# Patient Record
Sex: FEMALE | Race: WHITE | NOT HISPANIC OR LATINO | Employment: PART TIME | URBAN - METROPOLITAN AREA
[De-identification: names, ages, dates, MRNs, and addresses within clinical notes are randomized per-mention and may not be internally consistent; named-entity substitution may affect disease eponyms.]

---

## 2017-05-26 ENCOUNTER — TELEPHONE (OUTPATIENT)
Dept: INTERNAL MEDICINE | Facility: CLINIC | Age: 57
End: 2017-05-26

## 2017-05-26 NOTE — TELEPHONE ENCOUNTER
Panel Management Review      Patient has the following on her problem list: None      Composite cancer screening  Chart review shows that this patient is due/due soon for the following Pap Smear  Summary:    Patient is due/failing the following:   PAP and PHYSICAL    Action needed:   Patient needs office visit for Physical with pap.    Type of outreach:    Left detail message on voicemail per patient. Consent to Communicate on file.       Questions for provider review:    None                                                                                                                                     Trino Eric, [facility]       Chart routed to CLOSED .

## 2017-06-08 ENCOUNTER — HOSPITAL ENCOUNTER (OUTPATIENT)
Dept: MAMMOGRAPHY | Facility: CLINIC | Age: 57
Discharge: HOME | End: 2017-06-08
Attending: SURGERY | Admitting: SURGERY
Payer: COMMERCIAL

## 2017-06-08 DIAGNOSIS — Z12.31 VISIT FOR SCREENING MAMMOGRAM: ICD-10-CM

## 2017-06-08 PROCEDURE — 77063 BREAST TOMOSYNTHESIS BI: CPT

## 2017-06-24 ENCOUNTER — HEALTH MAINTENANCE LETTER (OUTPATIENT)
Age: 57
End: 2017-06-24

## 2017-07-26 ENCOUNTER — TELEPHONE (OUTPATIENT)
Dept: PEDIATRICS | Facility: CLINIC | Age: 57
End: 2017-07-26

## 2017-07-26 NOTE — TELEPHONE ENCOUNTER
"Patient requesting to have Dr. Quintero become her PCP, Dr. Quintero's practice is currently closed but patient asking because she \"is a [employer] employee\" and is hoping that Dr. Quintero will make this exception. Please advise if this is okay or not. If okay, call patient back to reschedule current physical to Dr. uQintero's schedule.  -Yaz Moss      "

## 2017-07-27 NOTE — TELEPHONE ENCOUNTER
I only have clinic on Tues and Fri, if she is willing to live with the few days available, can switch to me.

## 2017-08-25 ENCOUNTER — OFFICE VISIT (OUTPATIENT)
Dept: PEDIATRICS | Facility: CLINIC | Age: 57
End: 2017-08-25
Payer: COMMERCIAL

## 2017-08-25 VITALS
HEIGHT: 65 IN | BODY MASS INDEX: 18.74 KG/M2 | DIASTOLIC BLOOD PRESSURE: 82 MMHG | HEART RATE: 71 BPM | OXYGEN SATURATION: 99 % | SYSTOLIC BLOOD PRESSURE: 130 MMHG | TEMPERATURE: 97.6 F | WEIGHT: 112.5 LBS

## 2017-08-25 DIAGNOSIS — R63.4 UNINTENTIONAL WEIGHT LOSS: ICD-10-CM

## 2017-08-25 DIAGNOSIS — R25.2 LEG CRAMPS: ICD-10-CM

## 2017-08-25 DIAGNOSIS — Z00.01 ENCOUNTER FOR ROUTINE ADULT PHYSICAL EXAM WITH ABNORMAL FINDINGS: Primary | ICD-10-CM

## 2017-08-25 LAB
ALBUMIN SERPL-MCNC: 4.4 G/DL (ref 3.4–5)
ALP SERPL-CCNC: 61 U/L (ref 40–150)
ALT SERPL W P-5'-P-CCNC: 31 U/L (ref 0–50)
ANION GAP SERPL CALCULATED.3IONS-SCNC: 9 MMOL/L (ref 3–14)
AST SERPL W P-5'-P-CCNC: 29 U/L (ref 0–45)
BILIRUB SERPL-MCNC: 0.7 MG/DL (ref 0.2–1.3)
BUN SERPL-MCNC: 12 MG/DL (ref 7–30)
CALCIUM SERPL-MCNC: 9.4 MG/DL (ref 8.5–10.1)
CHLORIDE SERPL-SCNC: 101 MMOL/L (ref 94–109)
CO2 SERPL-SCNC: 26 MMOL/L (ref 20–32)
CREAT SERPL-MCNC: 0.84 MG/DL (ref 0.52–1.04)
FERRITIN SERPL-MCNC: 62 NG/ML (ref 8–252)
GFR SERPL CREATININE-BSD FRML MDRD: 71 ML/MIN/1.7M2
GLUCOSE SERPL-MCNC: 98 MG/DL (ref 70–99)
IRON SATN MFR SERPL: 30 % (ref 15–46)
IRON SERPL-MCNC: 119 UG/DL (ref 35–180)
POTASSIUM SERPL-SCNC: 4.3 MMOL/L (ref 3.4–5.3)
PROT SERPL-MCNC: 7.7 G/DL (ref 6.8–8.8)
SODIUM SERPL-SCNC: 136 MMOL/L (ref 133–144)
TIBC SERPL-MCNC: 391 UG/DL (ref 240–430)
TSH SERPL DL<=0.005 MIU/L-ACNC: 0.62 MU/L (ref 0.4–4)

## 2017-08-25 PROCEDURE — 83516 IMMUNOASSAY NONANTIBODY: CPT | Mod: 59 | Performed by: INTERNAL MEDICINE

## 2017-08-25 PROCEDURE — 82784 ASSAY IGA/IGD/IGG/IGM EACH: CPT | Performed by: INTERNAL MEDICINE

## 2017-08-25 PROCEDURE — 87624 HPV HI-RISK TYP POOLED RSLT: CPT | Performed by: INTERNAL MEDICINE

## 2017-08-25 PROCEDURE — 99396 PREV VISIT EST AGE 40-64: CPT | Performed by: INTERNAL MEDICINE

## 2017-08-25 PROCEDURE — 84443 ASSAY THYROID STIM HORMONE: CPT | Performed by: INTERNAL MEDICINE

## 2017-08-25 PROCEDURE — 83540 ASSAY OF IRON: CPT | Performed by: INTERNAL MEDICINE

## 2017-08-25 PROCEDURE — 83516 IMMUNOASSAY NONANTIBODY: CPT | Performed by: INTERNAL MEDICINE

## 2017-08-25 PROCEDURE — 82728 ASSAY OF FERRITIN: CPT | Performed by: INTERNAL MEDICINE

## 2017-08-25 PROCEDURE — G0123 SCREEN CERV/VAG THIN LAYER: HCPCS | Performed by: INTERNAL MEDICINE

## 2017-08-25 PROCEDURE — 82306 VITAMIN D 25 HYDROXY: CPT | Performed by: INTERNAL MEDICINE

## 2017-08-25 PROCEDURE — 83550 IRON BINDING TEST: CPT | Performed by: INTERNAL MEDICINE

## 2017-08-25 PROCEDURE — 36415 COLL VENOUS BLD VENIPUNCTURE: CPT | Performed by: INTERNAL MEDICINE

## 2017-08-25 PROCEDURE — 80053 COMPREHEN METABOLIC PANEL: CPT | Performed by: INTERNAL MEDICINE

## 2017-08-25 NOTE — PROGRESS NOTES
SUBJECTIVE:   CC: Mercedes Lema is an 57 year old woman who presents for preventive health visit.     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  Used to be just foot cramps.  Increasing in intensity and frequency.  Legs gets jumpy.  Has had x6mos.  Disturbs sleep.  Will also get during day, usually when commuting home.  No back pain or neuro symptoms in legs otherwise.  Not vegetarian.  Takes ca with D occasionally and centrum silver 1/2 tab.  Weight loss, unintentional but stress high and anxiety.  No GI symptoms other than occasionally diarrhea.    Colonoscopy - scheduled in Oct at [facility]    Today's PHQ-2 Score:   PHQ-2 ( 1999 Pfizer) 8/25/2017   Q1: Little interest or pleasure in doing things 0   Q2: Feeling down, depressed or hopeless 0   PHQ-2 Score 0   Q1: Little interest or pleasure in doing things Not at all   Q2: Feeling down, depressed or hopeless Not at all   PHQ-2 Score 0       Abuse: Current or Past(Physical, Sexual or Emotional)- No  Do you feel safe in your environment - Yes    Social History   Substance Use Topics     Smoking status: Never Smoker     Smokeless tobacco: Never Used     Alcohol use Yes      Comment: Glass of red wine 4 days a week     The patient does not drink >3 drinks per day nor >7 drinks per week.    Reviewed orders with patient.  Reviewed health maintenance and updated orders accordingly - Yes  Labs reviewed in [facility]    Patient over age 50, mutual decision to screen reflected in health maintenance.      Pertinent mammograms  "are reviewed under the imaging tab.  History of abnormal Pap smear: NO - age 30-65 PAP every 5 years with negative HPV co-testing recommended    Reviewed and updated as needed this visit by clinical staffTobacco  Allergies  Meds  Problems  Med Hx  Surg Hx  Fam Hx  Soc Hx          Reviewed and updated as needed this visit by Provider  Allergies  Meds  Problems              ROS:  C: NEGATIVE for fever, chills, change in weight  I: NEGATIVE for worrisome rashes, moles or lesions  E: NEGATIVE for vision changes or irritation  ENT: NEGATIVE for ear, mouth and throat problems  R: NEGATIVE for significant cough or SOB  B: NEGATIVE for masses, tenderness or discharge  CV: NEGATIVE for chest pain, palpitations or peripheral edema  GI: NEGATIVE for nausea, abdominal pain, heartburn, or change in bowel habits  : NEGATIVE for unusual urinary or vaginal symptoms. No vaginal bleeding.  M: NEGATIVE for significant arthralgias or myalgia  N: NEGATIVE for weakness, dizziness or paresthesias  P: NEGATIVE for changes in mood or affect      OBJECTIVE:   /82 (BP Location: Right arm, Patient Position: Chair, Cuff Size: Adult Regular)  Pulse 71  Temp 97.6  F (36.4  C) (Oral)  Ht 5' 4.5\" (1.638 m)  Wt 112 lb 8 oz (51 kg)  LMP 02/08/2013  SpO2 99%  BMI 19.01 kg/m2  EXAM:  GENERAL: healthy, alert and no distress  EYES: Eyes grossly normal to inspection, PERRL and conjunctivae and sclerae normal  HENT: ear canals and TM's normal, nose and mouth without ulcers or lesions  NECK: no adenopathy, no asymmetry, masses, or scars and thyroid normal to palpation  RESP: lungs clear to auscultation - no rales, rhonchi or wheezes  BREAST: normal without masses, tenderness or nipple discharge and no palpable axillary masses or adenopathy  CV: regular rate and rhythm, normal S1 S2, no S3 or S4, no murmur, click or rub, no peripheral edema and peripheral pulses strong  ABDOMEN: soft, nontender, no hepatosplenomegaly, no masses and " "bowel sounds normal   (female): normal female external genitalia, normal urethral meatus, vaginal mucosa atrophic, and normal cervix without masses or discharge  MS: no gross musculoskeletal defects noted, no edema  SKIN: no suspicious lesions or rashes  NEURO: Normal strength and tone, mentation intact and speech normal  PSYCH: mentation appears normal, affect normal/bright    ASSESSMENT/PLAN:   1. Encounter for routine adult physical exam with abnormal findings  Routine health education discussed: calcium, diet, exercise, weight, safety.   - Pap imaged thin layer screen with HPV - recommended age 30 - 65 years (select HPV order below)  - HPV High Risk Types DNA Cervical    2. Leg cramps  Will screen for metabolic cause with labs per orders.  Discussed yoga or stretches to help prevent  - Ferritin  - Iron and iron binding capacity  - Vitamin D Deficiency    3. Unintentional weight loss  Will screen for metabolic cause with labs per orders. Discussed adequate calorie intake.  Colonoscopy as scheduled.  - Ferritin  - Iron and iron binding capacity  - Vitamin D Deficiency  - TSH with free T4 reflex  - Comprehensive metabolic panel  - IgA  - Tissue transglutaminase rebecca IgA and IgG    COUNSELING:  Reviewed preventive health counseling, as reflected in patient instructions    BP Screening:   Last 3 BP Readings:    BP Readings from Last 3 Encounters:   08/25/17 130/82   11/14/16 (!) 123/91   09/07/16 108/60       The following was recommended to the patient:  Re-screen BP within a year and recommended lifestyle modifications     reports that she has never smoked. She has never used smokeless tobacco.    Estimated body mass index is 19.01 kg/(m^2) as calculated from the following:    Height as of this encounter: 5' 4.5\" (1.638 m).    Weight as of this encounter: 112 lb 8 oz (51 kg).         Counseling Resources:  ATP IV Guidelines  Pooled Cohorts Equation Calculator  Breast Cancer Risk Calculator  FRAX Risk " Assessment  ICSI Preventive Guidelines  Dietary Guidelines for Americans, 2010  USDA's MyPlate  ASA Prophylaxis  Lung CA Screening  The 10-year ASCVD risk score (Valorie DC Jr, et al., 2013) is: 1.4%    Values used to calculate the score:      Age: 57 years      Sex: Female      Is Non- : No      Diabetic: No      Tobacco smoker: No      Systolic Blood Pressure: 130 mmHg      Is BP treated: No      HDL Cholesterol: 64 mg/dL      Total Cholesterol: 162 mg/dL     Tabitha Quintero MD  [facility]

## 2017-08-25 NOTE — PATIENT INSTRUCTIONS
Preventive Health Recommendations  Female Ages 50 - 64    Yearly exam: See your health care provider every year in order to  o Review health changes.   o Discuss preventive care.    o Review your medicines if your doctor has prescribed any.        If you get Pap tests with HPV test, you only need to test every 5 years, unless you have an abnormal result.       Have a mammogram every 1 to 2 years.    Have a colonoscopy this year as scheduled    Have a cholesterol test every 5 years, you are due in 2021    Have a diabetes test (fasting glucose) every five years you are due in 2021      Shots: Get a flu shot each year. Get a tetanus shot every 10 years.  You are due in 2024    Nutrition:     Eat at least 5 servings of fruits and vegetables each day.    Eat whole-grain bread, whole-wheat pasta and brown rice instead of white grains and rice.    Talk to your provider about Calcium and Vitamin D.     Lifestyle    Exercise at least 150 minutes a week (30 minutes a day, 5 days a week). This will help you control your weight and prevent disease.  Add yoga and/or stretching to see if it helps with leg cramps.    Limit alcohol to one drink per day.    No smoking.     Wear sunscreen to prevent skin cancer.     See your dentist every six months for an exam and cleaning.    See your eye doctor every 1 to 2 years.

## 2017-08-25 NOTE — LETTER
September 7, 2017    Mercedes Lema  [address] [place] [address]    Dear Mercedes,  We are happy to inform you that your PAP smear result from 08/25/17 is normal.  We are now able to do a follow up test on PAP smears. The DNA test is for HPV (Human Papilloma Virus). Cervical cancer is closely linked with certain types of HPV. Your result showed no evidence of high risk HPV.  Therefore we recommend you return in 5 years for your next pap smear and HPV test.  You will still need to return to the clinic every year for an annual exam and other preventive tests.  Please contact the clinic at [phone] with any questions.  Sincerely,    Tabitha Quintero MD/grecia

## 2017-08-25 NOTE — MR AVS SNAPSHOT
After Visit Summary   8/25/2017    Mercedes Lema    MRN: [id number]           Patient Information     Date Of Birth          1960        Visit Information        Provider Department      8/25/2017 8:30 AM Tabitha Quintero MD [facility]        Today's Diagnoses     Encounter for routine adult physical exam with abnormal findings    -  1    Leg cramps        Unintentional weight loss          Care Instructions      Preventive Health Recommendations  Female Ages 50 - 64    Yearly exam: See your health care provider every year in order to  o Review health changes.   o Discuss preventive care.    o Review your medicines if your doctor has prescribed any.        If you get Pap tests with HPV test, you only need to test every 5 years, unless you have an abnormal result.       Have a mammogram every 1 to 2 years.    Have a colonoscopy this year as scheduled    Have a cholesterol test every 5 years, you are due in 2021    Have a diabetes test (fasting glucose) every five years you are due in 2021      Shots: Get a flu shot each year. Get a tetanus shot every 10 years.  You are due in 2024    Nutrition:     Eat at least 5 servings of fruits and vegetables each day.    Eat whole-grain bread, whole-wheat pasta and brown rice instead of white grains and rice.    Talk to your provider about Calcium and Vitamin D.     Lifestyle    Exercise at least 150 minutes a week (30 minutes a day, 5 days a week). This will help you control your weight and prevent disease.    Limit alcohol to one drink per day.    No smoking.     Wear sunscreen to prevent skin cancer.     See your dentist every six months for an exam and cleaning.    See your eye doctor every 1 to 2 years.            Follow-ups after your visit        Follow-up notes from your care team     Return in about 1 year (around 8/25/2018) for Physical Exam.      Your next 10 appointments already scheduled     Nov 06, 2017  8:40 AM CST   Return Visit  "with Petrona Infante MD   [facility] ([facility])    [address] [address] [phone]              Who to contact     If you have questions or need follow up information about today's clinic visit or your schedule please contact [facility] directly at [phone].  Normal or non-critical lab and imaging results will be communicated to you by Auxmoneyhart, letter or phone within 4 business days after the clinic has received the results. If you do not hear from us within 7 days, please contact the clinic through Auxmoneyhart or phone. If you have a critical or abnormal lab result, we will notify you by phone as soon as possible.  Submit refill requests through Dropmysite or call your pharmacy and they will forward the refill request to us. Please allow 3 business days for your refill to be completed.          Additional Information About Your Visit        [facility] Information     Dropmysite lets you send messages to your doctor, view your test results, renew your prescriptions, schedule appointments and more. To sign up, go to [email or web address] . Click on \"Log in\" on the left side of the screen, which will take you to the Welcome page. Then click on \"Sign up Now\" on the right side of the page.     You will be asked to enter the access code listed below, as well as some personal information. Please follow the directions to create your username and password.     Your access code is: [email or web address]  Expires: 2017  9:09 AM     Your access code will  in 90 days. If you need help or a new code, please call your [place] clinic or [phone].        Care EveryWhere ID     This is your Care EveryWhere ID. This could be used by other organizations to access your [place] medical records  [id number]        Your Vitals Were     Pulse Temperature Height Last Period Pulse Oximetry BMI (Body Mass Index)    71 " "97.6  F (36.4  C) (Oral) 5' 4.5\" (1.638 m) 02/08/2013 99% 19.01 kg/m2       Blood Pressure from Last 3 Encounters:   08/25/17 130/82   11/14/16 (!) 123/91   09/07/16 108/60    Weight from Last 3 Encounters:   08/25/17 112 lb 8 oz (51 kg)   11/14/16 120 lb (54.4 kg)   09/07/16 120 lb (54.4 kg)              We Performed the Following     Comprehensive metabolic panel     Ferritin     IgA     Iron and iron binding capacity     Tissue transglutaminase rebecca IgA and IgG     TSH with free T4 reflex     Vitamin D Deficiency        Primary Care Provider Office Phone # Fax #    Tabitha Quintero [phone] [address] [place] [address]        Equal Access to Services     [facility]: Hadii konrad rivero Soernesto, waaxda luqadaha, qaybta kaalmada vineet, nomi solorio . So [facility] [phone].    ATENCIÓN: Si habla español, tiene a [place] disposición servicios gratuitos de asistencia lingüística. [facility] [phone].    We comply with applicable federal civil rights laws and [place] laws. We do not discriminate on the basis of race, color, national origin, age, disability sex, sexual orientation or gender identity.            Thank you!     Thank you for choosing [facility]  for your care. Our goal is always to provide you with excellent care. Hearing back from our patients is one way we can continue to improve our services. Please take a few minutes to complete the written survey that you may receive in the mail after your visit with us. Thank you!             Your Updated Medication List - Protect others around you: Learn how to safely use, store and throw away your medicines at www.disposemymeds.org.          This list is accurate as of: 8/25/17  9:09 AM.  Always use your most recent med list.                   Brand Name Dispense Instructions for use Diagnosis    CALCIUM 600 + D PO      bid        cyclobenzaprine 5 MG tablet    FLEXERIL    30 " tablet    1-2 po tid prn    Back muscle spasm       fexofenadine 180 MG tablet    ALLEGRA    30 Tab    1 TABLET DAILY    Allergic rhinitis, cause unspecified       LORazepam 0.5 MG tablet    ATIVAN    20 tablet    Take 1 tablet (0.5 mg) by mouth every 6 hours as needed for anxiety    Acute stress reaction       Multi-vitamin Tabs tablet   Generic drug:  multivitamin, therapeutic with minerals      1 TABLET DAILY

## 2017-08-27 LAB — DEPRECATED CALCIDIOL+CALCIFEROL SERPL-MC: 56 UG/L (ref 20–75)

## 2017-08-28 LAB
IGA SERPL-MCNC: 115 MG/DL (ref 70–380)
TTG IGA SER-ACNC: <1 U/ML
TTG IGG SER-ACNC: 1 U/ML

## 2017-08-30 LAB
COPATH REPORT: NORMAL
PAP: NORMAL

## 2017-08-31 LAB
FINAL DIAGNOSIS: NORMAL
HPV HR 12 DNA CVX QL NAA+PROBE: NEGATIVE
HPV16 DNA SPEC QL NAA+PROBE: NEGATIVE
HPV18 DNA SPEC QL NAA+PROBE: NEGATIVE
SPECIMEN DESCRIPTION: NORMAL

## 2017-10-16 ENCOUNTER — TRANSFERRED RECORDS (OUTPATIENT)
Dept: HEALTH INFORMATION MANAGEMENT | Facility: CLINIC | Age: 57
End: 2017-10-16

## 2017-11-01 ENCOUNTER — TELEPHONE (OUTPATIENT)
Dept: PEDIATRICS | Facility: CLINIC | Age: 57
End: 2017-11-01

## 2017-11-01 NOTE — TELEPHONE ENCOUNTER
Please call pt to check on her weight - was losing weight unintentionally and BMI low.  Labs were normal and just had her colonoscopy was normal.  If still losing weight, should follow-up with me in clinic.

## 2017-11-02 NOTE — TELEPHONE ENCOUNTER
Routing to Dr. Quintero to update:     Patient calling back, she does not think she has lost any more weight. She doesn't weigh herself at home. Will start to weigh herself occasionally. I gave her her weight from 8/25/17 and advised that she call back if it's lower than this number. She agrees with plan.     She has also been increasing her intake. She relates increased stress due to loss of family members this year to her poor intake causing weight loss.     She feels that she is stable emotionally, has no concerns, believes going through the grieving process will improve stress levels.

## 2017-11-20 ENCOUNTER — OFFICE VISIT (OUTPATIENT)
Dept: SURGERY | Facility: CLINIC | Age: 57
End: 2017-11-20
Payer: COMMERCIAL

## 2017-11-20 VITALS
HEART RATE: 80 BPM | BODY MASS INDEX: 19.63 KG/M2 | SYSTOLIC BLOOD PRESSURE: 148 MMHG | HEIGHT: 64 IN | DIASTOLIC BLOOD PRESSURE: 88 MMHG | WEIGHT: 115 LBS

## 2017-11-20 DIAGNOSIS — D05.11 NEOPLASM OF RIGHT BREAST, PRIMARY TUMOR STAGING CATEGORY TIS: DUCTAL CARCINOMA IN SITU (DCIS): Primary | ICD-10-CM

## 2017-11-20 PROCEDURE — 99213 OFFICE O/P EST LOW 20 MIN: CPT | Performed by: SURGERY

## 2017-11-20 NOTE — NURSING NOTE
Breast Patients    BREAST PATIENTS (ALL)    1-Do you have any of the following symptoms? Other: NONE  2-In which breast are you having the symptoms? N/A  3-Do you use hormones?  No  4-Have you had a Mammogram? Yes  Where: [facility]  Date: April 2017  5-Have you ever had a breast cyst drained? No  6-Have you ever had a breast biopsy? Yes    7-Have you ever had a Breast Cancer? Yes  Side: Right  Date: 2009   8-Is there a history of Breast Cancer in your family? No  9-Have you ever had Ovarian Cancer? No  10-Is there a history of Ovarian Cancer in your family? No  11-Summarize your daily caffeine intake (i.e. coffee, tea, chocolate, soda etc.): Daily    BREAST PATIENTS (FEMALE)    12-What age did your periods begin? 14  13-Date your last menstrual period began? 2012  14-Number of full-term pregnancies: 0  15-How many children do you have? 0  Ages N/A  15-Your age when your first child was born? N/A  16-Did you nurse your children? N/A  17-Are you pregnant now? No  18-Have you begun menopause? Yes  Age Menopause began:  N/A  19-Have you had either ovary removed?Yes  Date of Surgery:  2012  20-Do you have breast implants? No   22-Are you on birth control? No  23-What is your marital status? single (never )  24-Do you exercise? Yes Several times per week  25-What is your occupation? Unknown

## 2017-11-20 NOTE — LETTER
"2017    Re: Mercedes Lema, : 1960    Mercedes Lema is a 57 year old female who is seen in follow up for a personal history of right breast DCIS for which she had a lumpectomy and radiation in .  The 3D mammograms from 2017 were negative by report and my review.  Mercedes reports that she has noted no breast changes and that her health has generally been good.  She did have some unintentional weight loss which may have been related to stress and which she is trying to correct with diet changes.     REVIEW OF SYSTEMS:  Constitutional:  Negative for chills, fatigue, fever and weight change.  Eyes:  Negative for blurred vision, eye pain and photophobia.  ENT:  Negative for hearing problems, ENT pain, congestion, rhinorrhea, epistaxis, hoarseness and dental problems.  Cardiovascular:  Negative for chest pain, palpitations, tachycardia, orthopnea and edema.  Respiratory:  Negative for cough, dyspnea and hemoptysis.  Gastrointestinal:  Negative for abdominal pain, heartburn, constipation, diarrhea and stool changes.  Musculoskeletal:  Negative for arthralgias, back pain and myalgias.  Integumentary/Breast:  See HPI.     Vitals: /88  Pulse 80  Ht 5' 4\" (1.626 m)  Wt 115 lb (52.2 kg)  LMP 2013  BMI 19.74 kg/m2  BMI= Body mass index is 19.74 kg/(m^2).     EXAM:  GENERAL: healthy, alert and no distress   BREAST:  The breasts appear symmetric with no overlying skin changes.  The nipples are normal bilaterally.  There is no dimpling or thickening of the skin.  Well healed scars are seen on both upper breasts.  No mass is appreciated in either breast.  The breast tissue is generally dense, but soft with the most density noted in the upper outer quadrants of both breasts.  There is no axillary or supraclavicular lymphadenopathy.  CARDIOVASCULAR:  No edema or JVD.  RESPIRATORY: negative findings: no chest deformities noted, no chest wall tenderness, breathing is unlabored.  NECK: " Neck supple. No adenopathy.   SKIN: No suspicious lesions or rashes  LYMPH: Normal cervical lymph nodes     ASSESSMENT:  Mercedes Lema appears to be doing very well.  There are no concerning findings on clinical breast exam or mammograms at this time.  She is at low risk for recurrence having had stage 0 breast cancer.     We talked about the issue of vaginal dryness in menopause and the use of vaginal estrogen.  I told Mercedes that it is considered safe for women who have had breast cancer to use vaginal estrogen if needed.     PLAN:  Mercedes will continue with annual screening mammograms with tomosynthesis and clinical breast exams.  She will follow up here as needed.     Petrona Infante MD

## 2017-11-20 NOTE — MR AVS SNAPSHOT
"              After Visit Summary   2017    Mercedes Lema    MRN: [id number]           Patient Information     Date Of Birth          1960        Visit Information        Provider Department      2017 9:00 AM Petrona Infante MD [facility]       Follow-ups after your visit        Who to contact     If you have questions or need follow up information about today's clinic visit or your schedule please contact [facility] directly at [phone].  Normal or non-critical lab and imaging results will be communicated to you by [facility], letter or phone within 4 business days after the clinic has received the results. If you do not hear from us within 7 days, please contact the clinic through [facility] or phone. If you have a critical or abnormal lab result, we will notify you by phone as soon as possible.  Submit refill requests through [email or web address] or call your pharmacy and they will forward the refill request to us. Please allow 3 business days for your refill to be completed.          Additional Information About Your Visit        MyChart Information     [email or web address] lets you send messages to your doctor, view your test results, renew your prescriptions, schedule appointments and more. To sign up, go to [email or web address] . Click on \"Log in\" on the left side of the screen, which will take you to the Welcome page. Then click on \"Sign up Now\" on the right side of the page.     You will be asked to enter the access code listed below, as well as some personal information. Please follow the directions to create your username and password.     Your access code is: [email or web address]  Expires: 2017  8:09 AM     Your access code will  in 90 days. If you need help or a new code, please call your [facility] clinic or [phone].        Care EveryWhere ID     This is your Care EveryWhere ID. This " "could be used by other organizations to access your [facility] medical records  [id number]        Your Vitals Were     Pulse Height Last Period BMI (Body Mass Index)          80 5' 4\" (1.626 m) 02/08/2013 19.74 kg/m2         Blood Pressure from Last 3 Encounters:   11/20/17 148/88   08/25/17 130/82   11/14/16 (!) 123/91    Weight from Last 3 Encounters:   11/20/17 115 lb (52.2 kg)   08/25/17 112 lb 8 oz (51 kg)   11/14/16 120 lb (54.4 kg)              Today, you had the following     No orders found for display       Primary Care Provider Office Phone # Fax #    Tabitha Quintero [phone] [address] [place] [id number]        Equal Access to Services     [facility]: Hadii konrad rivero Soern[place], waaxda luqadaha, qaybta kaalmada vineet, nomi solorio . So [facility] [phone].    ATENCIÓN: Si habla español, tiene a [place] disposición servicios gratuitos de asistencia lingüística. [facility] [phone].    We comply with applicable federal civil rights laws and [place] laws. We do not discriminate on the basis of race, color, national origin, age, disability, sex, sexual orientation, or gender identity.            Thank you!     Thank you for choosing [facility]  for your care. Our goal is always to provide you with excellent care. Hearing back from our patients is one way we can continue to improve our services. Please take a few minutes to complete the written survey that you may receive in the mail after your visit with us. Thank you!             Your Updated Medication List - Protect others around you: Learn how to safely use, store and throw away your medicines at www.disposemymeds.org.          This list is accurate as of: 11/20/17  9:26 AM.  Always use your most recent med list.                   Brand Name Dispense Instructions for use Diagnosis    CALCIUM 600 + D PO      bid        cyclobenzaprine 5 MG tablet    " FLEXERIL    30 tablet    1-2 po tid prn    Back muscle spasm       fexofenadine 180 MG tablet    ALLEGRA    30 Tab    1 TABLET DAILY    Allergic rhinitis, cause unspecified       LORazepam 0.5 MG tablet    ATIVAN    20 tablet    Take 1 tablet (0.5 mg) by mouth every 6 hours as needed for anxiety    Acute stress reaction       Multi-vitamin Tabs tablet   Generic drug:  multivitamin, therapeutic with minerals      1 TABLET DAILY

## 2017-11-20 NOTE — PROGRESS NOTES
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  The 3D mammograms from 6/2017 were negative by report and my review.  Mercedes reports that she has noted no breast changes and that her health has generally been good.  She did have some unintentional weight loss which may have been related to stress and which she is trying to correct with diet changes.    REVIEW OF SYSTEMS:  Constitutional:  Negative for chills, fatigue, fever and weight change.  Eyes:  Negative for blurred vision, eye pain and photophobia.  ENT:  Negative for hearing problems, ENT pain, congestion, rhinorrhea, epistaxis, hoarseness and dental problems.  Cardiovascular:  Negative for chest pain, palpitations, tachycardia, orthopnea and edema.  Respiratory:  Negative for cough, dyspnea and hemoptysis.  Gastrointestinal:  Negative for abdominal pain, heartburn, constipation, diarrhea and stool changes.  Musculoskeletal:  Negative for arthralgias, back pain and myalgias.  Integumentary/Breast:  See HPI.    Past Medical History:   Diagnosis Date     Allergic rhinitis, cause unspecified      Anxiety state, unspecified     fear of flying (Ativan)     Breast cancer (H) 8/7/2009    Right Breast Cancer       Past Surgical History:   Procedure Laterality Date     BIOPSY  8/7/2009    Right Breast Needle Biopsy     BIOPSY  8/20/2009    MRI Guided Right Breast Biopsy     BIOPSY  8/21/2009    Left Breast US Guided Biopsy     BREAST SURGERY  9/8/2009    Right/Left Lumpectomies     LAPAROSCOPIC HYSTERECTOMY SUPRACERVICAL, BILATERAL SALPINGO-OOPHORECTOMY, COMBINED  2/20/2013    Procedure: COMBINED LAPAROSCOPIC HYSTERECTOMY SUPRACERVICAL, SALPINGO-OOPHORECTOMY;  LAPAROSCOPIC HYSTERECTOMY SUPRACERVICAL,  "SALPINGO-OOPHORECTOMY (MORCELLATOR, TRIP LOOP)   ;  Surgeon: Suzy Soni MD;  Location: [facility]       Family History   Problem Relation Age of Onset     CANCER Mother      Cervical     Family History Negative Father      Breast Cancer Other      cousin       Social History   Substance Use Topics     Smoking status: Never Smoker     Smokeless tobacco: Never Used     Alcohol use Yes      Comment: Glass of red wine 4 days a week       Patient Active Problem List   Diagnosis     Allergic rhinitis     Breast cancer (H)     CARDIOVASCULAR SCREENING; LDL GOAL LESS THAN 160     Allergies   Allergen Reactions     Augmentin GI Disturbance     Tequin GI Disturbance     Current Outpatient Prescriptions   Medication Sig Dispense Refill     LORazepam (ATIVAN) 0.5 MG tablet Take 1 tablet (0.5 mg) by mouth every 6 hours as needed for anxiety 20 tablet 0     cyclobenzaprine (FLEXERIL) 5 MG tablet 1-2 po tid prn 30 tablet 1     FEXOFENADINE  MG PO TABS 1 TABLET DAILY 30 Tab 11     CALCIUM 600 + D OR bid       MULTI-VITAMIN OR TABS 1 TABLET DAILY       Vitals: /88  Pulse 80  Ht 5' 4\" (1.626 m)  Wt 115 lb (52.2 kg)  LMP 02/08/2013  BMI 19.74 kg/m2  BMI= Body mass index is 19.74 kg/(m^2).    EXAM:  GENERAL: healthy, alert and no distress   BREAST:  The breasts appear symmetric with no overlying skin changes.  The nipples are normal bilaterally.  There is no dimpling or thickening of the skin.  Well healed scars are seen on both upper breasts.  No mass is appreciated in either breast.  The breast tissue is generally dense, but soft with the most density noted in the upper outer quadrants of both breasts.  There is no axillary or supraclavicular lymphadenopathy.  CARDIOVASCULAR:  No edema or JVD.  RESPIRATORY: negative findings: no chest deformities noted, no chest wall tenderness, breathing is unlabored.  NECK: Neck supple. No adenopathy.   SKIN: No suspicious lesions or rashes  LYMPH: Normal cervical " lymph nodes    ASSESSMENT:  Mercedes Lema appears to be doing very well.  There are no concerning findings on clinical breast exam or mammograms at this time.  She is at low risk for recurrence having had stage 0 breast cancer.    We talked about the issue of vaginal dryness in menopause and the use of vaginal estrogen.  I told Mercedes that it is considered safe for women who have had breast cancer to use vaginal estrogen if needed.    PLAN:  Mercedes will continue with annual screening mammograms with tomosynthesis and clinical breast exams.  She will follow up here as needed.    Petrona Infante MD    Please route or send letter to:  Primary Care Provider (PCP)

## 2017-11-22 ENCOUNTER — TELEPHONE (OUTPATIENT)
Dept: PEDIATRICS | Facility: CLINIC | Age: 57
End: 2017-11-22

## 2017-11-22 DIAGNOSIS — N95.2 ATROPHIC VAGINITIS: Primary | ICD-10-CM

## 2017-11-22 RX ORDER — ESTRADIOL 10 UG/1
10 INSERT VAGINAL
Qty: 8 TABLET | Refills: 3 | Status: SHIPPED | OUTPATIENT
Start: 2017-11-23 | End: 2018-01-19

## 2017-11-22 NOTE — TELEPHONE ENCOUNTER
Patient states that at the time of her physical she had discussed vaginal dryness with Dr. Quintero. States Dr. Quintero had said that she should think about Vagifem and if she wanted it she should call back. States she discussed with an oncologist and he recommended the Vagifem tablet rather than the cream. She wants the Vagifem tablet. Please advise if she would need a visit to discuss. Pharmacy loaded if not necessary.  [phone] ok to kate Haywood, RN

## 2017-12-19 ENCOUNTER — TELEPHONE (OUTPATIENT)
Dept: PEDIATRICS | Facility: CLINIC | Age: 57
End: 2017-12-19

## 2017-12-19 NOTE — TELEPHONE ENCOUNTER
Patient calls.  Asking for Dr. Quintero's opinion if Vagifem could be causing higher blood pressure.  Patient started Vagifem 3 weeks ago, notes that her blood pressure has been higher since then.  States that she may have hypertension problem regardless of her taking the medication, but wanted to rule that out first.  Her BP in the evenings have been about 145/95 range, they are better in the mornings at 130-some/84 range.  She denies symptoms at daytime, but states that she has been getting headaches and feels lightheaded at times in the evenings.  Denies chest pain, SOB, weakness.  She denies scheduling appointment to check and discuss blood pressure, wants to rule this out first.  Please advise.    Rachell Ventura RN  Message handled by Nurse Triage.

## 2017-12-19 NOTE — TELEPHONE ENCOUNTER
vagifem should only be used twice weekly at bedtime.  It should not be causing this.  Should make an appointment.

## 2017-12-20 NOTE — TELEPHONE ENCOUNTER
Patient calls back, advised.  Taking twice a week as prescribed.  Advised to schedule appointment again.  Patient in agreement, appointment scheduled:    Next 5 appointments (look out 90 days)     Jan 02, 2018  3:50 PM CST   [facility] with Tabitha Quintero MD   [facility]    [address] [place] [id number] [phone]                Rachell Ventura RN  Message handled by Nurse Triage.

## 2017-12-22 ENCOUNTER — OFFICE VISIT (OUTPATIENT)
Dept: PEDIATRICS | Facility: CLINIC | Age: 57
End: 2017-12-22
Payer: COMMERCIAL

## 2017-12-22 VITALS
TEMPERATURE: 97.6 F | WEIGHT: 122.4 LBS | SYSTOLIC BLOOD PRESSURE: 132 MMHG | HEIGHT: 64 IN | BODY MASS INDEX: 20.9 KG/M2 | OXYGEN SATURATION: 98 % | DIASTOLIC BLOOD PRESSURE: 90 MMHG | HEART RATE: 77 BPM

## 2017-12-22 DIAGNOSIS — I10 BENIGN ESSENTIAL HYPERTENSION: Primary | ICD-10-CM

## 2017-12-22 PROCEDURE — 99214 OFFICE O/P EST MOD 30 MIN: CPT | Performed by: INTERNAL MEDICINE

## 2017-12-22 RX ORDER — HYDROCHLOROTHIAZIDE 12.5 MG/1
12.5 CAPSULE ORAL DAILY
Qty: 30 CAPSULE | Refills: 0 | Status: SHIPPED | OUTPATIENT
Start: 2017-12-22 | End: 2017-12-26

## 2017-12-22 NOTE — PATIENT INSTRUCTIONS
High Blood Pressure, New, Begin Treatment  Your blood pressure was high enough today to start treatment with medicines. Often health care providers don t know what causes high blood pressure (hypertension). But it can be controlled with lifestyle changes and medicines. High blood pressure usually has no symptoms. But it can sometimes cause headache, dizziness, blurred vision, a rushing sound in your ears, chest pain, or shortness of breath. But even without symptoms, high blood pressure that s not treated raises your risk for heart attack and stroke. High blood pressure is a serious health risk and shouldn t be ignored.    A blood pressure reading is made up of two numbers: a higher number over a lower number. The top number is the systolic pressure. The bottom number is the diastolic pressure. A normal blood pressure is a systolic pressure of less than 120 over a diastolic pressure less than 80. You will see your blood pressure readings written together. For example, a person with a systolic pressure of 118 and a diastolic pressure of 78 will have 118/78 written in the medical record.  High blood pressure is when either the top number is 140 or higher, or the bottom number is 90 or higher. High blood pressure is diagnosed when multiple, separate readings show blood pressures above 140/90. The blood pressures between normal and high are called prehypertension.  Home care  If you have high blood pressure, you should do what is listed below to lower your blood pressure. If you are taking medicines for high blood pressure, these methods may reduce or end your need for medicines in the future.    Begin a weight-loss program if you are overweight.    Cut back on how much salt you get in your diet. Here s how to do this:    Don t eat foods that have a lot of salt. These include olives, pickles, smoked meats, and salted potato chips.    Don t add salt to your food at the table.    Use only small amounts of salt when  cooking.    Review food labels to track how much salt is in prepared foods.    When eating out, ask that no additional salt be added to your food order.    Begin an exercise program. Talk with your health care provider about the type of exercise program that would be best for you. It doesn't have to be hard. Even brisk walking for 20 minutes 3 times a week is a good form of exercise.  Every little is good for you.    Don t take medicines that have heart stimulants. This includes many over-the-counter cold and sinus decongestant pills and sprays, as well as diet pills. Check the warnings about hypertension on the label. Before purchasing any over-the-counter medicines or supplements, always ask the pharmacist about the product's potential interaction with your high blood pressure and your high blood pressure medicines.    Stimulants such as amphetamine or cocaine could be lethal for someone with high blood pressure. Never take these.    Limit how much caffeine you get in your diet. Switch to caffeine-free products.    Stop smoking. If you are a long-time smoker, this can be hard. Enroll in a stop-smoking program to make it more likely that you will quit for good.    Learn how to handle stress. This is an important part of any program to lower blood pressure. Learn about relaxation methods like meditation, yoga, or biofeedback.    If your provider prescribed medicines, take them exactly as directed. Missing doses may cause your blood pressure get out of control.    If you miss a dose or doses, check with your healthcare provider or pharmacist about what to do.    Consider buying an automatic blood pressure machine. Your provider can make a recommendation. You can get one of these at most pharmacies.  The American Heart Association recommends the following guidelines for home blood pressure monitoring:    Don't smoke or drink coffee for 30 minutes before taking your blood pressure.    Go to the bathroom before the  test.    Relax for 5 minutes before taking the measurement.    Sit with your back supported (don't sit on a couch or soft chair); keep your feet on the floor uncrossed. Place your arm on a solid flat surface (like a table) with the upper part of the arm at heart level. Place the middle of the cuff directly above the eye of the elbow. Check the monitor's instruction manual for an illustration.    Take multiple readings. When you measure, take 2 to 3 readings one minute apart and record all of the results.    Take your blood pressure at the same time every day, or as your healthcare provider recommends.    Record the date, time, and blood pressure reading.    Take the record with you to your next medical appointment. If your blood pressure monitor has a built-in memory, simply take the monitor with you to your next appointment.    Call your provider if you have several high readings. Don't be frightened by a single high blood pressure reading, but if you get several high readings, check in with your healthcare provider.    Note: When blood pressure reaches a systolic (top number) of 180 or higher OR diastolic (bottom number) of 110 or higher, seek emergency medical treatment.  Follow-up care  Because a new blood pressure medicine was started today, it s important that you have your blood pressure rechecked. This is to make sure that the medicine is working and that you have no serious side effects. Keep all your follow up appointments. Write down medicine and blood pressure questions and bring them to your next appointment. If you have pressing concerns about your new medicine or your blood pressure, call your provider. Unless told otherwise, follow up with labs in 2 weeks and have your BP rechecked with the pharmacy and they will send it to me to adjust your medication.  When to seek medical advice  Call your healthcare provider right away if any of these occur:    Blood pressure reaches a systolic (top number) of  180 or higher, OR diastolic (bottom number) of 110 or higher, seek emergency medical treatment.    Chest pain or shortness of breath    Severe headache    Throbbing or rushing sound in the ears    Nosebleed    Sudden severe pain in your belly (abdomen)    Extreme drowsiness, confusion, or fainting    Dizziness or dizziness with a spinning sensation (vertigo)    Weakness of an arm or leg or one side of the face    You have problems speaking or seeing   Date Last Reviewed: 12/1/2016 2000-2017 [employer]. [address] [place] [id number]. All rights reserved. This information is not intended as a substitute for professional medical care. Always follow your healthcare professional's instructions.

## 2017-12-22 NOTE — PROGRESS NOTES
"  SUBJECTIVE:   Mercedes Lema is a 57 year old female who presents to clinic today for the following health issues:      Hypertension       Outpatient blood pressures are being checked at home.  Results are 140s/90s.    Low Salt Diet: no added salt        Amount of exercise or physical activity: None     Problems taking medications regularly: No    Medication side effects: none    Diet: regular (no restrictions)      Pt has family hx of hypertension and has been getting a head pressure for past 5d,mild nausea,fatigue,lightheaded and started taking her bp and has been running higher.  Sodium intake is minimal.  Eats out or processed food about 20%.    Problem list and histories reviewed & adjusted, as indicated.  Additional history: as documented    Labs reviewed in EPIC    Reviewed and updated as needed this visit by clinical staffTobacco  Allergies  Meds  Problems  Med Hx  Surg Hx  Fam Hx  Soc Hx        Reviewed and updated as needed this visit by Provider  Allergies  Meds  Problems         ROS:  Constitutional, cardiovascular, pulmonary, gi and gu systems are negative, except as otherwise noted.      OBJECTIVE:   /90 (BP Location: Right arm, Patient Position: Chair, Cuff Size: Adult Regular)  Pulse 77  Temp 97.6  F (36.4  C) (Oral)  Ht 5' 4\" (1.626 m)  Wt 122 lb 6.4 oz (55.5 kg)  LMP 02/08/2013  SpO2 98%  BMI 21.01 kg/m2  Body mass index is 21.01 kg/(m^2).  GENERAL: healthy, alert and no distress        ASSESSMENT/PLAN:       1. Benign essential hypertension  Discussed sodium restriction, maintaining ideal body weight and regular exercise program as physiologic means to achieve blood pressure control. The patient will strive towards this. Meanwhile, it is appropriate to lower BP with medications, while observing for therapeutic effect and if appropriate later, can discontinue medications if physiologic methods appear to be effective. The patient indicates understanding of these issues " and agrees with the plan. The various types of antihypertensives are discussed fully. See medication orders in EpicCare. Side effects explained in detail. Continue home readings and follow-up for labs and BP check with pharmacy in 2 wks  - hydrochlorothiazide (MICROZIDE) 12.5 MG capsule; Take 1 capsule (12.5 mg) by mouth daily  Dispense: 30 capsule; Refill: 0  - **Basic metabolic panel FUTURE 14d; Future    See Patient Instructions    The 10-year ASCVD risk score (Redding DC Jr, et al., 2013) is: 1.5%    Values used to calculate the score:      Age: 57 years      Sex: Female      Is Non- : No      Diabetic: No      Tobacco smoker: No      Systolic Blood Pressure: 132 mmHg      Is BP treated: No      HDL Cholesterol: 64 mg/dL      Total Cholesterol: 162 mg/dL     Tabitha Quintero MD  [facility] [place]    25 min with pt and more than 50% of the time was spent in counseling and coordination of care of the above issues

## 2017-12-22 NOTE — NURSING NOTE
"Chief Complaint   Patient presents with     Hypertension       Initial /90 (BP Location: Right arm, Patient Position: Chair, Cuff Size: Adult Regular)  Pulse 77  Temp 97.6  F (36.4  C) (Oral)  Ht 5' 4\" (1.626 m)  Wt 122 lb 6.4 oz (55.5 kg)  LMP 02/08/2013  SpO2 98%  BMI 21.01 kg/m2 Estimated body mass index is 21.01 kg/(m^2) as calculated from the following:    Height as of this encounter: 5' 4\" (1.626 m).    Weight as of this encounter: 122 lb 6.4 oz (55.5 kg).  Medication Reconciliation: complete   Dianne Carrillo MA    "

## 2017-12-22 NOTE — MR AVS SNAPSHOT
After Visit Summary   12/22/2017    Mercedes Lema    MRN: [id number]           Patient Information     Date Of Birth          1960        Visit Information        Provider Department      12/22/2017 3:50 PM Tabitha Quintero MD [facility]        Today's Diagnoses     Benign essential hypertension    -  1      Care Instructions      High Blood Pressure, New, Begin Treatment  Your blood pressure was high enough today to start treatment with medicines. Often health care providers don t know what causes high blood pressure (hypertension). But it can be controlled with lifestyle changes and medicines. High blood pressure usually has no symptoms. But it can sometimes cause headache, dizziness, blurred vision, a rushing sound in your ears, chest pain, or shortness of breath. But even without symptoms, high blood pressure that s not treated raises your risk for heart attack and stroke. High blood pressure is a serious health risk and shouldn t be ignored.    A blood pressure reading is made up of two numbers: a higher number over a lower number. The top number is the systolic pressure. The bottom number is the diastolic pressure. A normal blood pressure is a systolic pressure of less than 120 over a diastolic pressure less than 80. You will see your blood pressure readings written together. For example, a person with a systolic pressure of 118 and a diastolic pressure of 78 will have 118/78 written in the medical record.  High blood pressure is when either the top number is 140 or higher, or the bottom number is 90 or higher. High blood pressure is diagnosed when multiple, separate readings show blood pressures above 140/90. The blood pressures between normal and high are called prehypertension.  Home care  If you have high blood pressure, you should do what is listed below to lower your blood pressure. If you are taking medicines for high blood pressure, these methods may reduce or end your  need for medicines in the future.    Begin a weight-loss program if you are overweight.    Cut back on how much salt you get in your diet. Here s how to do this:    Don t eat foods that have a lot of salt. These include olives, pickles, smoked meats, and salted potato chips.    Don t add salt to your food at the table.    Use only small amounts of salt when cooking.    Review food labels to track how much salt is in prepared foods.    When eating out, ask that no additional salt be added to your food order.    Begin an exercise program. Talk with your health care provider about the type of exercise program that would be best for you. It doesn't have to be hard. Even brisk walking for 20 minutes 3 times a week is a good form of exercise.  Every little is good for you.    Don t take medicines that have heart stimulants. This includes many over-the-counter cold and sinus decongestant pills and sprays, as well as diet pills. Check the warnings about hypertension on the label. Before purchasing any over-the-counter medicines or supplements, always ask the pharmacist about the product's potential interaction with your high blood pressure and your high blood pressure medicines.    Stimulants such as amphetamine or cocaine could be lethal for someone with high blood pressure. Never take these.    Limit how much caffeine you get in your diet. Switch to caffeine-free products.    Stop smoking. If you are a long-time smoker, this can be hard. Enroll in a stop-smoking program to make it more likely that you will quit for good.    Learn how to handle stress. This is an important part of any program to lower blood pressure. Learn about relaxation methods like meditation, yoga, or biofeedback.    If your provider prescribed medicines, take them exactly as directed. Missing doses may cause your blood pressure get out of control.    If you miss a dose or doses, check with your healthcare provider or pharmacist about what to  do.    Consider buying an automatic blood pressure machine. Your provider can make a recommendation. You can get one of these at most pharmacies.  The American Heart Association recommends the following guidelines for home blood pressure monitoring:    Don't smoke or drink coffee for 30 minutes before taking your blood pressure.    Go to the bathroom before the test.    Relax for 5 minutes before taking the measurement.    Sit with your back supported (don't sit on a couch or soft chair); keep your feet on the floor uncrossed. Place your arm on a solid flat surface (like a table) with the upper part of the arm at heart level. Place the middle of the cuff directly above the eye of the elbow. Check the monitor's instruction manual for an illustration.    Take multiple readings. When you measure, take 2 to 3 readings one minute apart and record all of the results.    Take your blood pressure at the same time every day, or as your healthcare provider recommends.    Record the date, time, and blood pressure reading.    Take the record with you to your next medical appointment. If your blood pressure monitor has a built-in memory, simply take the monitor with you to your next appointment.    Call your provider if you have several high readings. Don't be frightened by a single high blood pressure reading, but if you get several high readings, check in with your healthcare provider.    Note: When blood pressure reaches a systolic (top number) of 180 or higher OR diastolic (bottom number) of 110 or higher, seek emergency medical treatment.  Follow-up care  Because a new blood pressure medicine was started today, it s important that you have your blood pressure rechecked. This is to make sure that the medicine is working and that you have no serious side effects. Keep all your follow up appointments. Write down medicine and blood pressure questions and bring them to your next appointment. If you have pressing concerns about  your new medicine or your blood pressure, call your provider. Unless told otherwise, follow up with labs in 2 weeks and have your BP rechecked with the pharmacy and they will send it to me to adjust your medication.  When to seek medical advice  Call your healthcare provider right away if any of these occur:    Blood pressure reaches a systolic (top number) of 180 or higher, OR diastolic (bottom number) of 110 or higher, seek emergency medical treatment.    Chest pain or shortness of breath    Severe headache    Throbbing or rushing sound in the ears    Nosebleed    Sudden severe pain in your belly (abdomen)    Extreme drowsiness, confusion, or fainting    Dizziness or dizziness with a spinning sensation (vertigo)    Weakness of an arm or leg or one side of the face    You have problems speaking or seeing   Date Last Reviewed: 12/1/2016 2000-2017 [employer]. [address] [place] [address]. All rights reserved. This information is not intended as a substitute for professional medical care. Always follow your healthcare professional's instructions.                Follow-ups after your visit        Follow-up notes from your care team     Return in about 2 weeks (around 1/5/2018) for BP Recheck.      Future tests that were ordered for you today     Open Future Orders        Priority Expected Expires Ordered    **Basic metabolic panel FUTURE 14d Routine 12/29/2017 1/5/2018 12/22/2017            Who to contact     If you have questions or need follow up information about today's clinic visit or your schedule please contact [facility] directly at [phone].  Normal or non-critical lab and imaging results will be communicated to you by MyChart, letter or phone within 4 business days after the clinic has received the results. If you do not hear from us within 7 days, please contact the clinic through MyChart or phone. If you have a critical or abnormal lab result, we will notify  "you by phone as soon as possible.  Submit refill requests through [employer] or call your pharmacy and they will forward the refill request to us. Please allow 3 business days for your refill to be completed.          Additional Information About Your Visit        [employer] Information     [employer] lets you send messages to your doctor, view your test results, renew your prescriptions, schedule appointments and more. To sign up, go to [email or web address] . Click on \"Log in\" on the left side of the screen, which will take you to the Welcome page. Then click on \"Sign up Now\" on the right side of the page.     You will be asked to enter the access code listed below, as well as some personal information. Please follow the directions to create your username and password.     Your access code is: [email or web address]  Expires: 3/22/2018  5:08 PM     Your access code will  in 90 days. If you need help or a new code, please call your [place] clinic or [phone].        Care EveryWhere ID     This is your Care EveryWhere ID. This could be used by other organizations to access your [place] medical records  [id number]        Your Vitals Were     Pulse Temperature Height Last Period Pulse Oximetry BMI (Body Mass Index)    77 97.6  F (36.4  C) (Oral) 5' 4\" (1.626 m) 2013 98% 21.01 kg/m2       Blood Pressure from Last 3 Encounters:   17 132/90   17 148/88   17 130/82    Weight from Last 3 Encounters:   17 122 lb 6.4 oz (55.5 kg)   17 115 lb (52.2 kg)   17 112 lb 8 oz (51 kg)                 Today's Medication Changes          These changes are accurate as of: 17  5:08 PM.  If you have any questions, ask your nurse or doctor.               Start taking these medicines.        Dose/Directions    hydrochlorothiazide 12.5 MG capsule   Commonly known as:  MICROZIDE   Used for:  Benign essential hypertension   Started by:  Tabitha Quintero MD        Dose:  12.5 mg   Take 1 capsule (12.5 " mg) by mouth daily   Quantity:  30 capsule   Refills:  0            Where to get your medicines      These medications were sent to [facility] [place] [facility] [place] - [address]   [address], [place] [address]     Phone:  [phone]     hydrochlorothiazide 12.5 MG capsule                Primary Care Provider Office Phone # Fax #    Tabitha Quintero [phone] [address] [place] [address]        Equal Access to Services     [facility]: Hadii aad ku hadasho Soomaali, waaxda luqadaha, qaybta kaalmada adeegyada, waxay idiin hayaan adeeg kharash lamatt . So [facility] [phone].    ATENCIÓN: Si habla español, tiene a [place] disposición servicios gratuitos de asistencia lingüística. [facility] [phone].    We comply with applicable federal civil rights laws and [place] laws. We do not discriminate on the basis of race, color, national origin, age, disability, sex, sexual orientation, or gender identity.            Thank you!     Thank you for choosing [facility]  for your care. Our goal is always to provide you with excellent care. Hearing back from our patients is one way we can continue to improve our services. Please take a few minutes to complete the written survey that you may receive in the mail after your visit with us. Thank you!             Your Updated Medication List - Protect others around you: Learn how to safely use, store and throw away your medicines at www.disposemymeds.org.          This list is accurate as of: 12/22/17  5:08 PM.  Always use your most recent med list.                   Brand Name Dispense Instructions for use Diagnosis    CALCIUM 600 + D PO      bid        cyclobenzaprine 5 MG tablet    FLEXERIL    30 tablet    1-2 po tid prn    Back muscle spasm       estradiol 10 MCG Tabs vaginal tablet    VAGIFEM    8 tablet    Place 1 tablet (10 mcg) vaginally twice a week    Atrophic vaginitis        fexofenadine 180 MG tablet    ALLEGRA    30 Tab    1 TABLET DAILY    Allergic rhinitis, cause unspecified       hydrochlorothiazide 12.5 MG capsule    MICROZIDE    30 capsule    Take 1 capsule (12.5 mg) by mouth daily    Benign essential hypertension       LORazepam 0.5 MG tablet    ATIVAN    20 tablet    Take 1 tablet (0.5 mg) by mouth every 6 hours as needed for anxiety    Acute stress reaction       Multi-vitamin Tabs tablet   Generic drug:  multivitamin, therapeutic with minerals      1 TABLET DAILY

## 2017-12-24 ENCOUNTER — NURSE TRIAGE (OUTPATIENT)
Dept: NURSING | Facility: CLINIC | Age: 57
End: 2017-12-24

## 2017-12-25 ENCOUNTER — NURSE TRIAGE (OUTPATIENT)
Dept: NURSING | Facility: CLINIC | Age: 57
End: 2017-12-25

## 2017-12-25 NOTE — TELEPHONE ENCOUNTER
"Mercedes calling with multiple questions regarding her high blood pressure and HCTZ medication. She reports \"a week ago tonight I wasn't feeling well and my blood pressure was high, it remained high for 5 days and I went in to the [facility] on Friday,the doctor prescribed a diuretic, I took the first one yesterday and one today, my blood pressure was better yesterday but tonight it is 165/97, 147/98\" and \"it was 150/95 prior to going to the doctor, I have never been on blood pressure meds before or diagnosis with high blood pressure until now.\" Mercedes is c/o \"feeling fatigued, my head feels pressure in it, and slight vision changes, but I've had these all week.\" She denies worsening or new symptoms at this time. Her questions are:     1. What is the half life of the medication? Per [employer] it is 5.6 h to 14.8 h for hydrochlorothiazide, but it can take at least 72 hours to 1 week to start seeing improvement in BP reading or symptoms (nursing knowledge).  2. Can I hop on the exercise bike for 5 -10 minutes, is it safe? It should be fine as long as you don't push yourself but you should discuss this with your doctor before starting an exercise regimen. (nursing knowledge)  3. What can I do to lower my blood pressure? Avoid stress or excitement, avoid caffeine, avoid strenuous activity, etc. (nursing knowledge)    Care advice given per guideline protocol; advised Mercedes to be seen in clinic within 2 weeks, but to follow up with Dr. Quintero this week via telephone to report her concerns/questions. Advised Mercedes to call FNA back or go to ER if new symptoms, worsening symptoms or a BP of 160/100. She should also call 911 if stoke symptoms or trouble breathing. Mercedes verbalized understanding of care advice given and plans to \"take an Ativan now and recheck my blood pressure, I think this is causing me anxiety and worry which isn't helping\" and follow up as planned in clinic in 2 weeks; she is unsure if/when " "she will call the clinic before that. This nurse offered to send a message to Dr. Quintero's care team for follow up on Tuesday, but she declined.     Bianca Poe RN  [facility]    Reason for Disposition    [1] BP  >= 140/90 AND [2] taking BP medications    Additional Information    Negative: Difficult to awaken or acting confused  (e.g., disoriented, slurred speech)    Negative: Severe difficulty breathing (e.g., struggling for each breath, speaks in single words)    Negative: [1] Weakness of the face, arm or leg on one side of the body AND [2] new onset    Negative: [1] Numbness (i.e., loss of sensation) of the face, arm or leg on one side of the body AND [2] new onset    Negative: [1] Chest pain lasts > 5 minutes AND [2] history of heart disease  (i.e., heart attack, bypass surgery, angina, angioplasty, CHF)    Negative: [1] Chest pain AND [2] took nitrogylcerin AND [3] pain was not relieved    Negative: Sounds like a life-threatening emergency to the triager    Negative: Symptom is main concern  (e.g., headache, chest pain)    Negative: Low blood pressure is main concern    Negative: [1] BP  >= 160 / 100 AND [2] cardiac or neurologic symptoms    (e.g., chest pain, difficulty breathing, unsteady gait, blurred vision)     Reports symptoms are unchanged since clinic visit    Negative: [1] Pregnant AND [2] new hand or face swelling    Negative: [1] Pregnant > 20 weeks AND [2] BP  >= 140/90    Negative: [1] BP  >= 200/120  AND [2] having NO cardiac or neurologic symptoms    Negative: [1] BP  >= 180/110 AND [2] missed most recent dose of blood pressure medication    Negative: BP  >= 180/110    Negative: Ran out of BP medications    Negative: BP  >= 160/100    Negative: [1] Taking BP medications AND [2] feels is having side effects (e.g., impotence, cough, dizzy upon standing)    Answer Assessment - Initial Assessment Questions  1. BLOOD PRESSURE: \"What is the blood pressure?\" \"Did you take at least two " "measurements 5 minutes apart?\"     165/97, 147/98  2. ONSET: \"When did you take your blood pressure?\"      tonight  3. HOW: \"How did you obtain the blood pressure?\" (e.g., visiting nurse, automatic home BP monitor)      Home BP cuff  4. HISTORY: \"Do you have a history of high blood pressure?\"      No, just started medication this past week  5. MEDICATIONS: \"Are you taking any medications for blood pressure?\" \"Have you missed any doses recently?\"      Yes, HCTZ, no missed doses  6. OTHER SYMPTOMS: \"Do you have any symptoms?\" (e.g., headache, chest pain, blurred vision, difficulty breathing, weakness)      Fatigued, head feels pressure in it, slight vision changes - unchanged since start of high BPs  7. PREGNANCY: \"Is there any chance you are pregnant?\" \"When was your last menstrual period?\"      n/a    Protocols used: HIGH BLOOD PRESSURE-ADULT-AH    "

## 2017-12-26 ENCOUNTER — TELEPHONE (OUTPATIENT)
Dept: PEDIATRICS | Facility: CLINIC | Age: 57
End: 2017-12-26

## 2017-12-26 DIAGNOSIS — I10 BENIGN ESSENTIAL HYPERTENSION: Primary | ICD-10-CM

## 2017-12-26 RX ORDER — HYDROCHLOROTHIAZIDE 12.5 MG/1
25 CAPSULE ORAL DAILY
Qty: 30 CAPSULE | Refills: 0 | Status: ON HOLD
Start: 2017-12-26 | End: 2018-01-03

## 2017-12-26 NOTE — TELEPHONE ENCOUNTER
Patient calling to follow up on her phone calls from over the weekend on her continued high blood pressure readings after starting HCTZ 12.5 mg qd on 12/22/17.     Per review of 12/25 & 12/24 telephone encounters, patient has had blood pressures in the 160s/90s in the evenings. She's taking these readings when she feels symptoms of pressure in head, fatigue, lightheadedness.     Reports morning blood pressures 140/94.     She is concerned about these high blood pressures, doesn't want it to be straining her heart. She'd like to double her HCTZ.     Will huddle with Dr. Quintero and follow up with the patient.

## 2017-12-26 NOTE — TELEPHONE ENCOUNTER
Huddled with Dr. Ingrid Henriquez to increase HCTZ to 25 mg qd. Take 2 - 12.5 mg caps qd & follow up in 1-2 weeks for blood pressure check & lab only. Either with LPN/MA or office visit.     Left detailed voicemail with plan on patient's number. Update medication list.

## 2017-12-26 NOTE — TELEPHONE ENCOUNTER
Reason for Disposition    BP  >= 160/100    Additional Information    Negative: Difficult to awaken or acting confused  (e.g., disoriented, slurred speech)    Negative: Severe difficulty breathing (e.g., struggling for each breath, speaks in single words)    Negative: [1] Weakness of the face, arm or leg on one side of the body AND [2] new onset    Negative: [1] Numbness (i.e., loss of sensation) of the face, arm or leg on one side of the body AND [2] new onset    Negative: [1] Chest pain lasts > 5 minutes AND [2] history of heart disease  (i.e., heart attack, bypass surgery, angina, angioplasty, CHF)    Negative: [1] Chest pain AND [2] took nitrogylcerin AND [3] pain was not relieved    Negative: Sounds like a life-threatening emergency to the triager    Negative: Symptom is main concern  (e.g., headache, chest pain)    Negative: Low blood pressure is main concern    Negative: [1] BP  >= 200/120  AND [2] having NO cardiac or neurologic symptoms    Negative: [1] BP  >= 180/110 AND [2] missed most recent dose of blood pressure medication    Negative: BP  >= 180/110    Negative: Ran out of BP medications    Protocols used: HIGH BLOOD PRESSURE-ADULT-AH

## 2017-12-31 ENCOUNTER — HOSPITAL ENCOUNTER (EMERGENCY)
Facility: CLINIC | Age: 57
Discharge: HOME | End: 2017-12-31
Attending: EMERGENCY MEDICINE | Admitting: EMERGENCY MEDICINE
Payer: COMMERCIAL

## 2017-12-31 ENCOUNTER — NURSE TRIAGE (OUTPATIENT)
Dept: NURSING | Facility: CLINIC | Age: 57
End: 2017-12-31

## 2017-12-31 VITALS
WEIGHT: 120 LBS | SYSTOLIC BLOOD PRESSURE: 140 MMHG | OXYGEN SATURATION: 97 % | HEART RATE: 106 BPM | TEMPERATURE: 97.5 F | DIASTOLIC BLOOD PRESSURE: 94 MMHG | RESPIRATION RATE: 14 BRPM | BODY MASS INDEX: 20.49 KG/M2 | HEIGHT: 64 IN

## 2017-12-31 DIAGNOSIS — I10 BENIGN ESSENTIAL HYPERTENSION: ICD-10-CM

## 2017-12-31 LAB
ANION GAP SERPL CALCULATED.3IONS-SCNC: 10 MMOL/L (ref 3–14)
ANION GAP SERPL CALCULATED.3IONS-SCNC: 14 MMOL/L (ref 6–17)
BASOPHILS # BLD AUTO: 0 10E9/L (ref 0–0.2)
BASOPHILS NFR BLD AUTO: 0.4 %
BUN SERPL-MCNC: 11 MG/DL (ref 7–30)
BUN SERPL-MCNC: 14 MG/DL (ref 7–30)
CA-I BLD-SCNC: 4.8 MG/DL (ref 4.4–5.2)
CALCIUM SERPL-MCNC: 9 MG/DL (ref 8.5–10.1)
CHLORIDE BLD-SCNC: 91 MMOL/L (ref 94–109)
CHLORIDE SERPL-SCNC: 90 MMOL/L (ref 94–109)
CO2 BLD-SCNC: 27 MMOL/L (ref 20–32)
CO2 SERPL-SCNC: 28 MMOL/L (ref 20–32)
CREAT BLD-MCNC: 0.8 MG/DL (ref 0.52–1.04)
CREAT SERPL-MCNC: 0.78 MG/DL (ref 0.52–1.04)
DIFFERENTIAL METHOD BLD: ABNORMAL
EOSINOPHIL # BLD AUTO: 0 10E9/L (ref 0–0.7)
EOSINOPHIL NFR BLD AUTO: 0.4 %
ERYTHROCYTE [DISTWIDTH] IN BLOOD BY AUTOMATED COUNT: 13.2 % (ref 10–15)
ETHANOL SERPL-MCNC: <0.01 G/DL
GFR SERPL CREATININE-BSD FRML MDRD: 74 ML/MIN/1.7M2
GFR SERPL CREATININE-BSD FRML MDRD: 77 ML/MIN/1.7M2
GLUCOSE BLD-MCNC: 113 MG/DL (ref 70–99)
GLUCOSE SERPL-MCNC: 100 MG/DL (ref 70–99)
HCT VFR BLD AUTO: 38.9 % (ref 35–47)
HCT VFR BLD CALC: 40 %PCV (ref 35–47)
HGB BLD CALC-MCNC: 13.6 G/DL (ref 11.7–15.7)
HGB BLD-MCNC: 13.3 G/DL (ref 11.7–15.7)
IMM GRANULOCYTES # BLD: 0 10E9/L (ref 0–0.4)
IMM GRANULOCYTES NFR BLD: 0.3 %
LYMPHOCYTES # BLD AUTO: 0.4 10E9/L (ref 0.8–5.3)
LYMPHOCYTES NFR BLD AUTO: 4.6 %
MCH RBC QN AUTO: 26.9 PG (ref 26.5–33)
MCHC RBC AUTO-ENTMCNC: 34.2 G/DL (ref 31.5–36.5)
MCV RBC AUTO: 79 FL (ref 78–100)
MONOCYTES # BLD AUTO: 0.4 10E9/L (ref 0–1.3)
MONOCYTES NFR BLD AUTO: 5.4 %
NEUTROPHILS # BLD AUTO: 7.1 10E9/L (ref 1.6–8.3)
NEUTROPHILS NFR BLD AUTO: 88.9 %
NRBC # BLD AUTO: 0 10*3/UL
NRBC BLD AUTO-RTO: 0 /100
PLATELET # BLD AUTO: 269 10E9/L (ref 150–450)
POTASSIUM BLD-SCNC: 3.4 MMOL/L (ref 3.4–5.3)
POTASSIUM SERPL-SCNC: 3.2 MMOL/L (ref 3.4–5.3)
RBC # BLD AUTO: 4.94 10E12/L (ref 3.8–5.2)
SODIUM BLD-SCNC: 132 MMOL/L (ref 133–144)
SODIUM SERPL-SCNC: 128 MMOL/L (ref 133–144)
TROPONIN I SERPL-MCNC: <0.015 UG/L (ref 0–0.04)
WBC # BLD AUTO: 8 10E9/L (ref 4–11)

## 2017-12-31 PROCEDURE — 93005 ELECTROCARDIOGRAM TRACING: CPT

## 2017-12-31 PROCEDURE — 99285 EMERGENCY DEPT VISIT HI MDM: CPT | Mod: 25

## 2017-12-31 PROCEDURE — 80047 BASIC METABLC PNL IONIZED CA: CPT | Mod: 91

## 2017-12-31 PROCEDURE — 85014 HEMATOCRIT: CPT | Mod: 91

## 2017-12-31 PROCEDURE — 85025 COMPLETE CBC W/AUTO DIFF WBC: CPT | Performed by: EMERGENCY MEDICINE

## 2017-12-31 PROCEDURE — 25000128 H RX IP 250 OP 636: Performed by: EMERGENCY MEDICINE

## 2017-12-31 PROCEDURE — 96360 HYDRATION IV INFUSION INIT: CPT

## 2017-12-31 PROCEDURE — 25000132 ZZH RX MED GY IP 250 OP 250 PS 637: Performed by: EMERGENCY MEDICINE

## 2017-12-31 PROCEDURE — 80320 DRUG SCREEN QUANTALCOHOLS: CPT | Performed by: EMERGENCY MEDICINE

## 2017-12-31 PROCEDURE — 80048 BASIC METABOLIC PNL TOTAL CA: CPT | Mod: XU | Performed by: EMERGENCY MEDICINE

## 2017-12-31 PROCEDURE — 93005 ELECTROCARDIOGRAM TRACING: CPT | Mod: 76

## 2017-12-31 PROCEDURE — 84484 ASSAY OF TROPONIN QUANT: CPT | Performed by: EMERGENCY MEDICINE

## 2017-12-31 RX ORDER — IBUPROFEN 600 MG/1
600 TABLET, FILM COATED ORAL ONCE
Status: COMPLETED | OUTPATIENT
Start: 2017-12-31 | End: 2017-12-31

## 2017-12-31 RX ORDER — ACETAMINOPHEN 325 MG/1
650 TABLET ORAL ONCE
Status: COMPLETED | OUTPATIENT
Start: 2017-12-31 | End: 2017-12-31

## 2017-12-31 RX ORDER — POTASSIUM CHLORIDE 1500 MG/1
40 TABLET, EXTENDED RELEASE ORAL ONCE
Status: COMPLETED | OUTPATIENT
Start: 2017-12-31 | End: 2017-12-31

## 2017-12-31 RX ORDER — SODIUM CHLORIDE 9 MG/ML
1000 INJECTION, SOLUTION INTRAVENOUS CONTINUOUS
Status: DISCONTINUED | OUTPATIENT
Start: 2017-12-31 | End: 2018-01-01 | Stop reason: HOSPADM

## 2017-12-31 RX ADMIN — ACETAMINOPHEN 650 MG: 325 TABLET, FILM COATED ORAL at 20:05

## 2017-12-31 RX ADMIN — SODIUM CHLORIDE 1000 ML: 9 INJECTION, SOLUTION INTRAVENOUS at 20:06

## 2017-12-31 RX ADMIN — IBUPROFEN 600 MG: 600 TABLET ORAL at 19:08

## 2017-12-31 RX ADMIN — POTASSIUM CHLORIDE 40 MEQ: 1500 TABLET, EXTENDED RELEASE ORAL at 20:04

## 2017-12-31 ASSESSMENT — ENCOUNTER SYMPTOMS
SHORTNESS OF BREATH: 0
COUGH: 1
NAUSEA: 1
RHINORRHEA: 1
SORE THROAT: 1
NUMBNESS: 0
HEADACHES: 1

## 2017-12-31 NOTE — ED AVS SNAPSHOT
[facility] Department    [address]    [facility] [address]    Phone:  [phone]    Fax:  [phone]                                       Mercedes Lema   MRN: [id number]    Department:  [facility] Emergency Department   Date of Visit:  12/31/2017           After Visit Summary Signature Page     I have received my discharge instructions, and my questions have been answered. I have discussed any challenges I see with this plan with the nurse or doctor.    ..........................................................................................................................................  Patient/Patient Representative Signature      ..........................................................................................................................................  Patient Representative Print Name and Relationship to Patient    ..................................................               ................................................  Date                                            Time    ..........................................................................................................................................  Reviewed by Signature/Title    ...................................................              ..............................................  Date                                                            Time

## 2017-12-31 NOTE — TELEPHONE ENCOUNTER
BP 30 minutes ago was 158/107, headache 3/10, fatigue, lightheaded, nausea present.  These symptoms have been present since 12/17 but are now worse today.  Pt currently taking 12.5mg Hydrochlorothiazide, was told to double the dose on 12/26/17 but has not done this yet.  Pt believes the may be coming down with a viral infection.  Paged on-call Dr. Theodora Raines at 4:30pm on her cell phone who stated pt should be evaluated in the ED today.  Pt agrees with this plan and will go to [facility] ED.     Reason for Disposition    [1] BP  >= 140/90 AND [2] taking BP medications    [1] New headache AND [2] age > 50    Additional Information    Negative: Difficult to awaken or acting confused  (e.g., disoriented, slurred speech)    Negative: Severe difficulty breathing (e.g., struggling for each breath, speaks in single words)    Negative: [1] Weakness of the face, arm or leg on one side of the body AND [2] new onset    Negative: [1] Numbness (i.e., loss of sensation) of the face, arm or leg on one side of the body AND [2] new onset    Negative: [1] Chest pain lasts > 5 minutes AND [2] history of heart disease  (i.e., heart attack, bypass surgery, angina, angioplasty, CHF)    Negative: [1] Chest pain AND [2] took nitrogylcerin AND [3] pain was not relieved    Negative: Sounds like a life-threatening emergency to the triager    Negative: Low blood pressure is main concern    Negative: [1] BP  >= 160 / 100 AND [2] cardiac or neurologic symptoms    (e.g., chest pain, difficulty breathing, unsteady gait, blurred vision)    Negative: [1] Pregnant AND [2] new hand or face swelling    Negative: [1] Pregnant > 20 weeks AND [2] BP  >= 140/90    Negative: [1] BP  >= 200/120  AND [2] having NO cardiac or neurologic symptoms    Negative: [1] BP  >= 180/110 AND [2] missed most recent dose of blood pressure medication    Negative: BP  >= 180/110    Negative: Ran out of BP medications    Negative: BP  >= 160/100    Negative: [1]  "Taking BP medications AND [2] feels is having side effects (e.g., impotence, cough, dizzy upon standing)    Negative: Difficult to awaken or acting confused  (e.g., disoriented, slurred speech)    Negative: [1] Weakness of the face, arm or leg on one side of the body AND [2] new onset    Negative: [1] Numbness of the face, arm or leg on one side of the body AND [2] new onset    Negative: [1] Loss of speech or garbled speech AND [2] new onset    Negative: Passed out (i.e., lost consciousness, collapsed and was not responding)    Negative: Sounds like a life-threatening emergency to the triager    Negative: Followed a head injury within last 3 days    Negative: Pregnant    Negative: Traumatic Brain Injury (TBI) is suspected    Negative: Unable to walk, or can only walk with assistance (e.g., requires support)    Negative: Stiff neck (can't touch chin to chest)    Negative: Severe pain in one eye    Negative: [1] Other family members (or roommates) with headaches AND [2] possibility of carbon monoxide exposure    Negative: [1] SEVERE headache (e.g., excruciating) AND [2] \"worst headache\" of life    Negative: [1] SEVERE headache AND [2] sudden-onset (i.e., reaching maximum intensity within seconds)    Negative: [1] SEVERE headache AND [2] fever    Negative: Loss of vision or double vision (Exception: same as prior migraines)    Negative: [1] Fever > 100.5 F (38.1 C) AND [2] diabetes mellitus or weak immune system (e.g., HIV positive, cancer chemo, splenectomy, organ transplant, chronic steroids)    Negative: Patient sounds very sick or weak to the triager    Negative: [1] SEVERE headache (e.g., excruciating) AND [2] not improved after 2 hours of pain medicine    Negative: [1] Vomiting AND [2] 2 or more times (Exception: similar to previous migraines)    Negative: Fever > 104 F (40 C)    Negative: [1] MODERATE headache (e.g., interferes with normal activities) AND [2] present > 24 hours AND [3] unexplained  (Exceptions: " analgesics not tried, typical migraine, or headache part of viral illness)    Negative: [1] New headache AND [2] weak immune system (e.g., HIV positive, cancer chemo, splenectomy, organ transplant, chronic steroids)    Symptom is main concern  (e.g., headache, chest pain)    Protocols used: HIGH BLOOD PRESSURE-ADULT-AH, HEADACHE-ADULT-AH

## 2017-12-31 NOTE — ED NOTES
Patient states she has been having elevated BP for the past few days.  Seen at the clinic and given HCTZ but it is not working.  Now having worsening symptoms of headache and dizziness.  Last time she took  HCTZ 12.5mg was 1645    ABCs intact.  Alert and oriented x 3.

## 2017-12-31 NOTE — ED AVS SNAPSHOT
[facility] Emergency Department    [address] [place] [address]    Phone:  [phone]    Fax:  [phone]                                       Mercedes Lema   MRN: [id number]    Department:  [facility] Emergency Department   Date of Visit:  12/31/2017           Patient Information     Date Of Birth          1960        Your diagnoses for this visit were:     Benign essential hypertension        You were seen by Sue Powell MD.      Follow-up Information     Follow up with Tabitha Quintero MD. Call in 2 days.    Specialties:  Internal Medicine, Pediatrics    Contact information:    [facility] [place] [id number] [phone]          Discharge Instructions       Continue taking the hydrochlorothiazide, 25mg daily.     Continue checking your blood pressure, daily. Follow up with clinic this week for a recheck visit with your provider, as well as for labs on Friday.    If you have any different or worse symptoms like severe headache, dizziness, chest pain, trouble breathing, return to the ER right away.      Established High Blood Pressure    High blood pressure (hypertension) is a chronic disease. Often, healthcare providers don t know what causes it. But it can be caused by certain health conditions and medicines.  If you have high blood pressure, you may not have any symptoms. If you do have symptoms, they may include headache, dizziness, changes in your vision, chest pain, and shortness of breath. But even without symptoms, high blood pressure that s not treated raises your risk for heart attack and stroke. High blood pressure is a serious health risk and shouldn t be ignored.  A blood pressure reading is made up of two numbers: a higher number over a lower number. The top number is the systolic pressure. The bottom number is the diastolic pressure. A normal blood pressure is a systolic pressure of  less than 120 over a diastolic  pressure of less than 80. You will see your blood pressure readings written together. For example, a person with a systolic pressure of 188 and a diastolic pressure of 78 will have 118/78 written in the medical record.  High blood pressure is when either the top number is 140 or higher, or the bottom number is 90 or higher. This must be the result when taking your blood pressure a number of times. The blood pressures between normal and high are called prehypertension.  Home care  If you have high blood pressure, you should do what is listed below to lower your blood pressure. If you are taking medicines for high blood pressure, these methods may reduce or end your need for medicines in the future.    Begin a weight-loss program if you are overweight.    Cut back on how much salt you get in your diet. Here s how to do this:    Don t eat foods that have a lot of salt. These include olives, pickles, smoked meats, and salted potato chips.    Don t add salt to your food at the table.    Use only small amounts of salt when cooking.    Start an exercise program. Talk with your healthcare provider about the type of exercise program that would be best for you. It doesn't have to be hard. Even brisk walking for 20 minutes 3 times a week is a good form of exercise.    Don t take medicines that stimulate the heart. This includes many over-the-counter cold and sinus decongestant pills and sprays, as well as diet pills. Check the warnings about hypertension on the label. Before buying any over-the-counter medicines or supplements, always ask the pharmacist about the product's potential interaction with your high blood pressure and your high blood pressure medicines.    Stimulants such as amphetamine or cocaine could be deadly for someone with high blood pressure. Never take these.    Limit how much caffeine you get in your diet. Switch to caffeine-free products.    Stop smoking. If you are a long-time smoker, this can be hard.  Talk to your healthcare provider about medicines and nicotine replacement options to help you. Also, enroll in a stop-smoking program to make it more likely that you will quit for good.    Learn how to handle stress. This is an important part of any program to lower blood pressure. Learn about relaxation methods like meditation, yoga, or biofeedback.    If your provider prescribed medicines, take them exactly as directed. Missing doses may cause your blood pressure get out of control.    If you miss a dose or doses, check with your healthcare provider or pharmacist about what to do.    Consider buying an automatic blood pressure machine. Ask your provider for a recommendation. You can get one of these at most pharmacies.     The American Heart Association recommends the following guidelines for home blood pressure monitoring:    Don't smoke or drink coffee for 30 minutes before taking your blood pressure.    Go to the bathroom before the test.    Relax for 5 minutes before taking the measurement.    Sit with your back supported (don't sit on a couch or soft chair); keep your feet on the floor uncrossed. Place your arm on a solid flat surface (like a table) with the upper part of the arm at heart level. Place the middle of the cuff directly above the eye of the elbow. Check the monitor's instruction manual for an illustration.    Take multiple readings. When you measure, take 2 to 3 readings one minute apart and record all of the results.    Take your blood pressure at the same time every day, or as your healthcare provider recommends.    Record the date, time, and blood pressure reading.    Take the record with you to your next medical appointment. If your blood pressure monitor has a built-in memory, simply take the monitor with you to your next appointment.    Call your provider if you have several high readings. Don't be frightened by a single high blood pressure reading, but if you get several high readings,  check in with your healthcare provider.    Note: When blood pressure reaches a systolic (top number) of 180 or higher OR diastolic (bottom number) of 110 or higher, seek emergency medical treatment.  Follow-up care  You will need to see your healthcare provider regularly. This is to check your blood pressure and to make changes to your medicines. Make a follow-up appointment as directed. Bring the record of your home blood pressure readings to the appointment.  When to seek medical advice  Call your healthcare provider right away if any of these occur:    Blood pressure reaches a systolic (upper number) of 180 or higher OR a diastolic (bottom number) of 110 or higher    Chest pain or shortness of breath    Severe headache    Throbbing or rushing sound in the ears    Nosebleed    Sudden severe pain in your belly (abdomen)    Extreme drowsiness, confusion, or fainting    Dizziness or spinning sensation (vertigo)    Weakness of an arm or leg or one side of the face    You have problems speaking or seeing   Date Last Reviewed: 12/1/2016 2000-2017 [employer]. [address] [place] [address]. All rights reserved. This information is not intended as a substitute for professional medical care. Always follow your healthcare professional's instructions.      Potassium-Rich Foods  The normal adult diet usually contains 2,000 mg to 4,000 mg of potassium per day. More potassium is needed when you lose too much potassium from your body. This can happen if you have diarrhea or vomiting. It can also happen if you take a medicine to make you urinate more (diuretic). To increase the amount of potassium in your diet, include these high-potassium foods.     [The (*) indicates foods highest in potassium.]  Vegetables  Artichokes. Cooked 1/2 cup, 200 mg to 300 mg*  Asparagus. Cooked 1/2 cup, 200 mg to 300 mg  Beans. White, red, anguiano cooked 1/2 cup, 300 mg to 500 mg*  Beets. Cooked 1/2 cup, 200 mg to 300  mg  Broccoli. Cooked or raw 1 cup, 200 mg to 500 mg*  Howell sprouts. Cooked 1/2 cup, 200 mg to 300 mg  Cabbage. Raw 1 cup, 100 mg to 200 mg  Carrots. Raw or cooked 1/2 cup, 100 mg to 200 mg  Celery. Raw 1 cup, 200 mg to 300 mg  Lima beans. Fresh or frozen 1/2 cup, 300 mg to 500 mg*   Mushrooms. Raw or cooked 1/2 cup, 100 mg to 300 mg  Peas. Cooked 1/2 cup, 150 mg to 250 mg   Potatoes. Baked 1 medium, 500 mg to 900 mg*   Spinach. Cooked 1 cup, 800 mg to 900 mg*   Spinach. Raw 2 cups, 300 mg to 400 mg *  Squash, winter. Fresh, frozen, or cooked 1/2 cup, 200 mg to 400 mg   Tomato. Fresh 1 medium, 200 mg to 300 mg   Tomato juice. Canned 1/2 cup, 200 mg to 300 mg   Fruits  Apple juice. Unsweetened 1 cup, 200 mg to 300 mg   Apricots. Canned 1/2 cup, 200 mg to 300 mg   Apricots. Dried 4 pieces, 100 mg to 200 mg   Avocado. Raw 1/2 cup, 300 mg to 400 mg*  Banana. Fresh 1 small, 300 mg to 400 mg*   Cantaloupe. Fresh 1 cup diced, 300 mg to 400 mg*   Grape juice. Unsweetened 1 cup, 200 mg to 300 mg   Honeydew melon. Fresh 1 cup diced, 300 mg to 400 mg*   Orange. Fresh 1 medium, 200 mg to 300 mg    Orange juice. Unsweetened, fresh or frozen 1/2 cup, 200 mg to 300 mg  Pineapple juice. Unsweetened 1 cup, 300 mg to 400 mg   Prune juice. Unsweetened 1/2 cup, 300 mg to 400 mg*   Prunes. Dried 5 pieces, 300 mg to 400 mg*   Strawberries. Fresh or frozen 1 cup, 200 mg to 300 mg  Meat  Red meat. Cooked 3 ounces, 100 mg to 300 mg   Seafood  Cod, flounder, halibut. Cooked 3 ounces, 100 mg to 300 mg*  Norton. Cooked, 3 ounces 300 mg to 400 mg*   Scallops. Cooked 3 ounces, 200 mg to 300 mg*  Shrimp. Cooked 3/4 cup, 100 mg to 200 mg   Tuna. Fresh or canned 3/4 cup, 200 mg to 500 mg   Date Last Reviewed: 10/1/2016    7206-7898 [employer]. [address] [place] [address]. All rights reserved. This information is not intended as a substitute for professional medical care. Always follow your healthcare professional's  instructions.            Future Appointments        Provider Department Dept Phone Center    1/5/2018 8:50 AM [facility] Lab [facility] [phone] [facility]      24 Hour Appointment Hotline       To make an appointment at any [facility], call [phone] ([phone]). If you don't have a family doctor or clinic, we will help you find one. [facility] are conveniently located to serve the needs of you and your family.             Review of your medicines      Our records show that you are taking the medicines listed below. If these are incorrect, please call your family doctor or clinic.        Dose / Directions Last dose taken    CALCIUM 600 + D PO        bid   Refills:  0        cyclobenzaprine 5 MG tablet   Commonly known as:  FLEXERIL   Quantity:  30 tablet        1-2 po tid prn   Refills:  1        estradiol 10 MCG Tabs vaginal tablet   Commonly known as:  VAGIFEM   Dose:  10 mcg   Quantity:  8 tablet        Place 1 tablet (10 mcg) vaginally twice a week   Refills:  3        fexofenadine 180 MG tablet   Commonly known as:  ALLEGRA   Quantity:  30 Tab        1 TABLET DAILY   Refills:  11        hydrochlorothiazide 12.5 MG capsule   Commonly known as:  MICROZIDE   Dose:  25 mg   Quantity:  30 capsule        Take 2 capsules (25 mg) by mouth daily   Refills:  0        LORazepam 0.5 MG tablet   Commonly known as:  ATIVAN   Dose:  0.5 mg   Quantity:  20 tablet        Take 1 tablet (0.5 mg) by mouth every 6 hours as needed for anxiety   Refills:  0        Multi-vitamin Tabs tablet   Generic drug:  multivitamin, therapeutic with minerals        1 TABLET DAILY   Refills:  0                Procedures and tests performed during your visit     Procedure/Test Number of Times Performed    Alcohol ethyl 1    Basic metabolic panel 1    CBC with platelets differential 1    Cardiac Continuous Monitoring 1    EKG 12-lead, tracing only 2    ISTAT Basic Met ICa HCT POCT 1    Troponin I 1       Orders Needing Specimen Collection     None      Pending Results     Date and Time Order Name Status Description    12/31/2017 1953 EKG 12-lead, tracing only Preliminary     12/31/2017 1829 EKG 12-lead, tracing only Preliminary             Pending Culture Results     No orders found from 12/29/2017 to 1/1/2018.            Pending Results Instructions     If you had any lab results that were not finalized at the time of your Discharge, you can call the ED Lab Result RN at [phone]. You will be contacted by this team for any positive Lab results or changes in treatment. The nurses are available 7 days a week from 10A to 6:30P.  You can leave a message 24 hours per day and they will return your call.        Test Results From Your Hospital Stay        12/31/2017  6:48 PM      Component Results     Component Value Ref Range & Units Status    WBC 8.0 4.0 - 11.0 10e9/L Final    RBC Count 4.94 3.8 - 5.2 10e12/L Final    Hemoglobin 13.3 11.7 - 15.7 g/dL Final    Hematocrit 38.9 35.0 - 47.0 % Final    MCV 79 78 - 100 fl Final    MCH 26.9 26.5 - 33.0 pg Final    MCHC 34.2 31.5 - 36.5 g/dL Final    RDW 13.2 10.0 - 15.0 % Final    Platelet Count 269 150 - 450 10e9/L Final    Diff Method Automated Method  Final    % Neutrophils 88.9 % Final    % Lymphocytes 4.6 % Final    % Monocytes 5.4 % Final    % Eosinophils 0.4 % Final    % Basophils 0.4 % Final    % Immature Granulocytes 0.3 % Final    Nucleated RBCs 0 0 /100 Final    Absolute Neutrophil 7.1 1.6 - 8.3 10e9/L Final    Absolute Lymphocytes 0.4 (L) 0.8 - 5.3 10e9/L Final    Absolute Monocytes 0.4 0.0 - 1.3 10e9/L Final    Absolute Eosinophils 0.0 0.0 - 0.7 10e9/L Final    Absolute Basophils 0.0 0.0 - 0.2 10e9/L Final    Abs Immature Granulocytes 0.0 0 - 0.4 10e9/L Final    Absolute Nucleated RBC 0.0  Final         12/31/2017  7:07 PM      Component Results     Component Value Ref Range & Units Status    Sodium 128 (L) 133 - 144 mmol/L Final    Potassium 3.2 (L) 3.4 - 5.3  mmol/L Final    Chloride 90 (L) 94 - 109 mmol/L Final    Carbon Dioxide 28 20 - 32 mmol/L Final    Anion Gap 10 3 - 14 mmol/L Final    Glucose 100 (H) 70 - 99 mg/dL Final    Urea Nitrogen 14 7 - 30 mg/dL Final    Creatinine 0.78 0.52 - 1.04 mg/dL Final    GFR Estimate 77 >60 mL/min/1.7m2 Final    Non  GFR Calc    GFR Estimate If Black >90 >60 mL/min/1.7m2 Final    African American GFR Calc    Calcium 9.0 8.5 - 10.1 mg/dL Final         12/31/2017  7:08 PM      Component Results     Component Value Ref Range & Units Status    Troponin I ES <0.015 0.000 - 0.045 ug/L Final    The 99th percentile for upper reference range is 0.045 ug/L.  Troponin values   in the range of 0.045 - 0.120 ug/L may be associated with risks of adverse   clinical events.           12/31/2017  8:58 PM      Component Results     Component Value Ref Range & Units Status    Ethanol g/dL <0.01 <0.01 g/dL Final         12/31/2017  9:56 PM      Component Results     Component Value Ref Range & Units Status    Sodium 132 (L) 133 - 144 mmol/L Final    Potassium 3.4 3.4 - 5.3 mmol/L Final    Chloride 91 (L) 94 - 109 mmol/L Final    Total CO2 27 20 - 32 mmol/L Final    Anion Gap 14 6 - 17 mmol/L Final    Glucose 113 (H) 70 - 99 mg/dL Final    Urea Nitrogen 11 7 - 30 mg/dL Final    Creatinine 0.8 0.52 - 1.04 mg/dL Final    GFR Estimate 74 >60 mL/min/1.7m2 Final    GFR Estimate If Black >90 >60 mL/min/1.7m2 Final    Calcium Ionized 4.8 4.4 - 5.2 mg/dL Final    Hemoglobin 13.6 11.7 - 15.7 g/dL Final    Hematocrit - POCT 40 35.0 - 47.0 %PCV Final                Clinical Quality Measure: Blood Pressure Screening     Your blood pressure was checked while you were in the emergency department today. The last reading we obtained was  BP: (!) 141/101 . Please read the guidelines below about what these numbers mean and what you should do about them.  If your systolic blood pressure (the top number) is less than 120 and your diastolic blood pressure  "(the bottom number) is less than 80, then your blood pressure is normal. There is nothing more that you need to do about it.  If your systolic blood pressure (the top number) is 120-139 or your diastolic blood pressure (the bottom number) is 80-89, your blood pressure may be higher than it should be. You should have your blood pressure rechecked within a year by a primary care provider.  If your systolic blood pressure (the top number) is 140 or greater or your diastolic blood pressure (the bottom number) is 90 or greater, you may have high blood pressure. High blood pressure is treatable, but if left untreated over time it can put you at risk for heart attack, stroke, or kidney failure. You should have your blood pressure rechecked by a primary care provider within the next 4 weeks.  If your provider in the emergency department today gave you specific instructions to follow-up with your doctor or provider even sooner than that, you should follow that instruction and not wait for up to 4 weeks for your follow-up visit.        Thank you for choosing [facility]       Thank you for choosing [facility] for your care. Our goal is always to provide you with excellent care. Hearing back from our patients is one way we can continue to improve our services. Please take a few minutes to complete the written survey that you may receive in the mail after you visit with us. Thank you!        [employer] Information     [employer] lets you send messages to your doctor, view your test results, renew your prescriptions, schedule appointments and more. To sign up, go to [email or web address] . Click on \"Log in\" on the left side of the screen, which will take you to the Welcome page. Then click on \"Sign up Now\" on the right side of the page.     You will be asked to enter the access code listed below, as well as some personal information. Please follow the directions to create your username and password.     Your access code is: " [email or web address]  Expires: 3/22/2018  5:08 PM     Your access code will  in 90 days. If you need help or a new code, please call your [facility] clinic or [phone].        Care EveryWhere ID     This is your Care EveryWhere ID. This could be used by other organizations to access your [facility] medical records  [id number]        Equal Access to Services     [facility] : Hadii konrad rangelo Soernesto, waaxda luqadaha, qaybta kaalmada adepolinada, nomi solorio . So [facility] [phone].    ATENCIÓN: Si habla español, tiene a [place] disposición servicios gratuitos de asistencia lingüística. Llame al [phone].    We comply with applicable federal civil rights laws and [place] laws. We do not discriminate on the basis of race, color, national origin, age, disability, sex, sexual orientation, or gender identity.            After Visit Summary       This is your record. Keep this with you and show to your community pharmacist(s) and doctor(s) at your next visit.

## 2018-01-01 ENCOUNTER — NURSE TRIAGE (OUTPATIENT)
Dept: NURSING | Facility: CLINIC | Age: 58
End: 2018-01-01

## 2018-01-01 NOTE — DISCHARGE INSTRUCTIONS
Continue taking the hydrochlorothiazide, 25mg daily.     Continue checking your blood pressure, daily. Follow up with clinic this week for a recheck visit with your provider, as well as for labs on Friday.    If you have any different or worse symptoms like severe headache, dizziness, chest pain, trouble breathing, return to the ER right away.      Established High Blood Pressure    High blood pressure (hypertension) is a chronic disease. Often, healthcare providers don t know what causes it. But it can be caused by certain health conditions and medicines.  If you have high blood pressure, you may not have any symptoms. If you do have symptoms, they may include headache, dizziness, changes in your vision, chest pain, and shortness of breath. But even without symptoms, high blood pressure that s not treated raises your risk for heart attack and stroke. High blood pressure is a serious health risk and shouldn t be ignored.  A blood pressure reading is made up of two numbers: a higher number over a lower number. The top number is the systolic pressure. The bottom number is the diastolic pressure. A normal blood pressure is a systolic pressure of  less than 120 over a diastolic pressure of less than 80. You will see your blood pressure readings written together. For example, a person with a systolic pressure of 188 and a diastolic pressure of 78 will have 118/78 written in the medical record.  High blood pressure is when either the top number is 140 or higher, or the bottom number is 90 or higher. This must be the result when taking your blood pressure a number of times. The blood pressures between normal and high are called prehypertension.  Home care  If you have high blood pressure, you should do what is listed below to lower your blood pressure. If you are taking medicines for high blood pressure, these methods may reduce or end your need for medicines in the future.    Begin a weight-loss program if you are  overweight.    Cut back on how much salt you get in your diet. Here s how to do this:    Don t eat foods that have a lot of salt. These include olives, pickles, smoked meats, and salted potato chips.    Don t add salt to your food at the table.    Use only small amounts of salt when cooking.    Start an exercise program. Talk with your healthcare provider about the type of exercise program that would be best for you. It doesn't have to be hard. Even brisk walking for 20 minutes 3 times a week is a good form of exercise.    Don t take medicines that stimulate the heart. This includes many over-the-counter cold and sinus decongestant pills and sprays, as well as diet pills. Check the warnings about hypertension on the label. Before buying any over-the-counter medicines or supplements, always ask the pharmacist about the product's potential interaction with your high blood pressure and your high blood pressure medicines.    Stimulants such as amphetamine or cocaine could be deadly for someone with high blood pressure. Never take these.    Limit how much caffeine you get in your diet. Switch to caffeine-free products.    Stop smoking. If you are a long-time smoker, this can be hard. Talk to your healthcare provider about medicines and nicotine replacement options to help you. Also, enroll in a stop-smoking program to make it more likely that you will quit for good.    Learn how to handle stress. This is an important part of any program to lower blood pressure. Learn about relaxation methods like meditation, yoga, or biofeedback.    If your provider prescribed medicines, take them exactly as directed. Missing doses may cause your blood pressure get out of control.    If you miss a dose or doses, check with your healthcare provider or pharmacist about what to do.    Consider buying an automatic blood pressure machine. Ask your provider for a recommendation. You can get one of these at most pharmacies.     The American  Heart Association recommends the following guidelines for home blood pressure monitoring:    Don't smoke or drink coffee for 30 minutes before taking your blood pressure.    Go to the bathroom before the test.    Relax for 5 minutes before taking the measurement.    Sit with your back supported (don't sit on a couch or soft chair); keep your feet on the floor uncrossed. Place your arm on a solid flat surface (like a table) with the upper part of the arm at heart level. Place the middle of the cuff directly above the eye of the elbow. Check the monitor's instruction manual for an illustration.    Take multiple readings. When you measure, take 2 to 3 readings one minute apart and record all of the results.    Take your blood pressure at the same time every day, or as your healthcare provider recommends.    Record the date, time, and blood pressure reading.    Take the record with you to your next medical appointment. If your blood pressure monitor has a built-in memory, simply take the monitor with you to your next appointment.    Call your provider if you have several high readings. Don't be frightened by a single high blood pressure reading, but if you get several high readings, check in with your healthcare provider.    Note: When blood pressure reaches a systolic (top number) of 180 or higher OR diastolic (bottom number) of 110 or higher, seek emergency medical treatment.  Follow-up care  You will need to see your healthcare provider regularly. This is to check your blood pressure and to make changes to your medicines. Make a follow-up appointment as directed. Bring the record of your home blood pressure readings to the appointment.  When to seek medical advice  Call your healthcare provider right away if any of these occur:    Blood pressure reaches a systolic (upper number) of 180 or higher OR a diastolic (bottom number) of 110 or higher    Chest pain or shortness of breath    Severe headache    Throbbing or  rushing sound in the ears    Nosebleed    Sudden severe pain in your belly (abdomen)    Extreme drowsiness, confusion, or fainting    Dizziness or spinning sensation (vertigo)    Weakness of an arm or leg or one side of the face    You have problems speaking or seeing   Date Last Reviewed: 12/1/2016 2000-2017 [employer]. [address] [place] [address]. All rights reserved. This information is not intended as a substitute for professional medical care. Always follow your healthcare professional's instructions.      Potassium-Rich Foods  The normal adult diet usually contains 2,000 mg to 4,000 mg of potassium per day. More potassium is needed when you lose too much potassium from your body. This can happen if you have diarrhea or vomiting. It can also happen if you take a medicine to make you urinate more (diuretic). To increase the amount of potassium in your diet, include these high-potassium foods.     [The (*) indicates foods highest in potassium.]  Vegetables  Artichokes. Cooked 1/2 cup, 200 mg to 300 mg*  Asparagus. Cooked 1/2 cup, 200 mg to 300 mg  Beans. White, red, anguiano cooked 1/2 cup, 300 mg to 500 mg*  Beets. Cooked 1/2 cup, 200 mg to 300 mg  Broccoli. Cooked or raw 1 cup, 200 mg to 500 mg*  Yellow Spring sprouts. Cooked 1/2 cup, 200 mg to 300 mg  Cabbage. Raw 1 cup, 100 mg to 200 mg  Carrots. Raw or cooked 1/2 cup, 100 mg to 200 mg  Celery. Raw 1 cup, 200 mg to 300 mg  Lima beans. Fresh or frozen 1/2 cup, 300 mg to 500 mg*   Mushrooms. Raw or cooked 1/2 cup, 100 mg to 300 mg  Peas. Cooked 1/2 cup, 150 mg to 250 mg   Potatoes. Baked 1 medium, 500 mg to 900 mg*   Spinach. Cooked 1 cup, 800 mg to 900 mg*   Spinach. Raw 2 cups, 300 mg to 400 mg *  Squash, winter. Fresh, frozen, or cooked 1/2 cup, 200 mg to 400 mg   Tomato. Fresh 1 medium, 200 mg to 300 mg   Tomato juice. Canned 1/2 cup, 200 mg to 300 mg   Fruits  Apple juice. Unsweetened 1 cup, 200 mg to 300 mg   Apricots. Canned 1/2  cup, 200 mg to 300 mg   Apricots. Dried 4 pieces, 100 mg to 200 mg   Avocado. Raw 1/2 cup, 300 mg to 400 mg*  Banana. Fresh 1 small, 300 mg to 400 mg*   Cantaloupe. Fresh 1 cup diced, 300 mg to 400 mg*   Grape juice. Unsweetened 1 cup, 200 mg to 300 mg   Honeydew melon. Fresh 1 cup diced, 300 mg to 400 mg*   Orange. Fresh 1 medium, 200 mg to 300 mg    Orange juice. Unsweetened, fresh or frozen 1/2 cup, 200 mg to 300 mg  Pineapple juice. Unsweetened 1 cup, 300 mg to 400 mg   Prune juice. Unsweetened 1/2 cup, 300 mg to 400 mg*   Prunes. Dried 5 pieces, 300 mg to 400 mg*   Strawberries. Fresh or frozen 1 cup, 200 mg to 300 mg  Meat  Red meat. Cooked 3 ounces, 100 mg to 300 mg   Seafood  Cod, flounder, halibut. Cooked 3 ounces, 100 mg to 300 mg*  [place]. Cooked, 3 ounces 300 mg to 400 mg*   Scallops. Cooked 3 ounces, 200 mg to 300 mg*  Shrimp. Cooked 3/4 cup, 100 mg to 200 mg   Tuna. Fresh or canned 3/4 cup, 200 mg to 500 mg   Date Last Reviewed: 10/1/2016    4732-0866 [employer]. [address] [place] [address], [place] [address]. All rights reserved. This information is not intended as a substitute for professional medical care. Always follow your healthcare professional's instructions.

## 2018-01-01 NOTE — ED PROVIDER NOTES
History     Chief Complaint:  Hypertension    HPI   Mercedes Lema is a 57 year old female with a history of hypertension who presents to the emergency department today for because of concerns about her blood pressure. On 12/17/2017, the patient experienced onset of intermittent headaches that are localized to the top and front of her head with associated symptoms of lightheadedness, nausea, and fatigue. She saw her primary doctor on 12/22 and was started on 12.5 mg of Hydrochlorothiazide daily. Before 12/17, she states that her blood pressure was about 130s/70s. After 12/17, her blood pressure has been higher around 150s/90s. She regularly checks her blood pressure at home once in the morning and once in the evening, and within the past week, she has noticed an increase in her blood pressure along with worsening symptoms of headaches and nausea approximately 5-6 days of the week. Today, her medication was increased to 25 mg of Hydrochlorothiazide daily. These symptoms are present tonight as she presents to the emergency department, with a mild headache 3/10. She also has subjective fever, generalized body aches, cough, sore throat, runny nose, congestion, with recent exposure to a sick coworker. These cold symptoms reportedly started last night. She otherwise denies vision changes, numbness, tingling, chest pain, palpitations, or shortness of breath. The patient states that she has a history of frontal headaches that tend to occur approximately twice per week in the same region as the headaches associated with hypertension. She states that her stressful job gives her headaches. She has an appointment with her primary doctor on 1/5/2018 to do laboratory testings. Of note, TSH labs and CMP were normal 3 months ago.     Allergies:  Augmentin  Tequin    Medications:    hydrochlorothiazide (MICROZIDE) 12.5 MG capsule  estradiol (VAGIFEM) 10 MCG TABS vaginal tablet  LORazepam (ATIVAN) 0.5 MG tablet  cyclobenzaprine  "(FLEXERIL) 5 MG tablet  FEXOFENADINE  MG PO TABS    Past Medical History:    Allergic rhinitis, cause unspecified   Anxiety state, unspecified   Breast cancer (H)   Hypertension     Past Surgical History:    Right breast biopsy  MRI guided breast biopsy  Left breast biopsy  Breast surgery   Laparoscopic Hysterectomy supracervical, bilateral salpingo-oophorectomy, combined    Family History:    Cervical cancer Mother  Hypertension   Mother  Breast cancer  Cousin     Social History:  Smoking Status: Never Smoker  Smokeless Tobacco: Never Used  Alcohol Use: Positive; 4 days per week  Marital Status:  Single     Review of Systems   HENT: Positive for congestion, rhinorrhea and sore throat.    Eyes: Negative for visual disturbance.   Respiratory: Positive for cough. Negative for shortness of breath.    Cardiovascular: Negative for chest pain.   Gastrointestinal: Positive for nausea.   Neurological: Positive for headaches. Negative for numbness.   All other systems reviewed and are negative.    Physical Exam     Patient Vitals for the past 24 hrs:   BP Temp Temp src Pulse Heart Rate Resp SpO2 Height Weight   12/31/17 2130 (!) 140/94 - - - 80 14 - - -   12/31/17 2115 137/82 - - - 89 (!) 48 - - -   12/31/17 2100 (!) 141/101 - - - 85 16 97 % - -   12/31/17 2045 (!) 139/91 - - - 86 18 97 % - -   12/31/17 2030 (!) 148/97 - - - - - - - -   12/31/17 2028 140/90 - - - - - - - -   12/31/17 2015 (!) 152/103 - - - 81 9 99 % - -   12/31/17 2000 (!) 136/92 - - - - - - - -   12/31/17 1945 (!) 149/99 - - - - - - - -   12/31/17 1930 (!) 143/94 - - - - - 98 % - -   12/31/17 1920 (!) 165/107 - - - - - - - -   12/31/17 1905 - - - - 95 - 98 % - -   12/31/17 1900 - - - - 89 23 99 % - -   12/31/17 1845 - - - - 92 12 98 % - -   12/31/17 1830 - - - - - - 99 % - -   12/31/17 1735 (!) 175/115 97.5  F (36.4  C) Temporal 106 - 18 98 % 1.626 m (5' 4\") 54.4 kg (120 lb)     Physical Exam  Constitutional:  Well developed, Well nourished, Very " anxious-appearing. Otherwise comfortable  HENT:  Bilateral external ears normal, Mucous membranes moist, Nose normal. Neck- Normal range of motion, Supple. TM's unremarkable. Posterior oropharynx mild erythema.  Eyes: PERRL, EOMI, conjunctivae unremarkable  Respiratory:  Normal breath sounds, No respiratory distress, No wheezing,  Cardiovascular:  Normal heart rate, Normal rhythm, No murmurs,    GI:  Bowel sounds normal, Soft, No tenderness,   Musculoskeletal:  Intact distal pulses, No edema, grossly unremarkable range of motion   Integument:  Warm, Dry   Neurological: Alert, attentive and oriented to person, place and time  Cranial nerves 2-12 intact;   5/5 strength throughout the upper and lower extremities;   Sensation intact to light touch throughout the upper and lower extremities;   Finger-nose-finger testing unremarkable  Psychiatric:  Very anxious, somewhat pressured speech    Emergency Department Course     ECG1:  ECG taken at 1838, ECG read at 1852. Poor quality study.  Normal sinus rhythm  Possible left atrial enlargement  Nonspecific ST abnormality- new since previous 9/7/16  Abnormal ECG  Rate 89 bpm. DE interval 118 ms. QRS duration 82 ms. QT/QTc 390/474 ms. P-R-T axes 75 78 43.    ECG2:  ECG taken at 2022, ECG read at 2053  Normal sinus rhythm  Possible left atrial enlargement  Borderline ECG, but no significant change compared to 9/7/16  Rate 78 bpm. DE interval 118 ms. QRS duration 100 ms. QT/QTc 400/456 ms. P-R-T axes 64 78 36.    Laboratory:  Laboratory findings were communicated with the patient who voiced understanding of the findings.    Istat Basic Met ICa HCT POCT (Collected 2148)  (L), Chloride 91 (L), Glucose 113 (H) o/w WNL (Creatinine 0.8)   CBC: WBC 8.0, HGB 13.3,   BMP:  (L), Potassium 3.2 (L), Chloride 90 (L), Glucose (Collected 1836) 100 (H) o/w WNL (Creatinine 0.78)  Troponin I (Collected 1836) <0.015   Alcohol ethyl: <0.01    Interventions:  1908 Ibuprofen 600 mg  PO   2004 Klor-Con M 40 mEq PO  2005 Tylenol 650 mg PO  2006 NS 1000 mL IV      Emergency Department Course:     Nursing notes and vitals reviewed.    1805 I performed an exam of the patient as documented above.     IV was inserted and blood was drawn for laboratory testing, results above.    1935 I updated the patient. She is overall feeling better, with improved headache, no new symptoms    2145 I personally reviewed the lab and EKG results with the patient and answered all related questions prior to discharge. She is visibly more comfortable/less anxious appearing. Discussed results at length, plan at length. I did recommend she be rechecked in clinic no later than Friday, and continue monitoring her BP at home on the higher dose of HCTZ. Disucssed indications for return.    I discussed the treatment plan with the patient. They expressed understanding of this plan and consented to discharge. They will be discharged home with instructions for care and follow up. In addition, the patient will return to the emergency department if their symptoms persist, worsen, if new symptoms arise or if there is any concern.  All questions were answered.     Impression & Plan      Medical Decision Making:  Mercedes Lema is a 57 year old female who presents for evaluation of elevated blood pressure.  There is a recently diagnosed history of hypertension in the past.  The workup here showed only some mild hypokalemia/hyponatremia and the patient does not have any clinical, laboratory, ecg or historical signs of end-organ dysfunction.  There is no signs of hypertensive emergency or urgency. Blood pressure decreased significantly without intervention to levels similar to earlier this month. Supportive outpatient management is therefore indicated with close follow-up of primary care physician.  Given data obtained here in ED, will have her continue the increased dose of HCTZ for therapy at this time and encouraged serial blood  pressure monitoring at home to aid primary in decision making regarding hypertension. Discussed potassium rich foods. She again is completley comfortable with plan of discharge.     Diagnosis:    ICD-10-CM    1. Benign essential hypertension I10      Disposition:   The patient is discharged to home.     Scribe Disclosure:  I, Rakel Asher, am serving as a scribe at 6:07 PM on 12/31/2017 to document services personally performed by Sue Powell MD, based on my observations and the provider's statements to me.      [facility] EMERGENCY DEPARTMENT       Sue Powell MD  12/31/17 7801

## 2018-01-02 ENCOUNTER — HOSPITAL ENCOUNTER (OUTPATIENT)
Facility: CLINIC | Age: 58
Setting detail: OBSERVATION
Discharge: HOME | End: 2018-01-03
Attending: EMERGENCY MEDICINE | Admitting: INTERNAL MEDICINE
Payer: COMMERCIAL

## 2018-01-02 ENCOUNTER — TELEPHONE (OUTPATIENT)
Dept: PEDIATRICS | Facility: CLINIC | Age: 58
End: 2018-01-02

## 2018-01-02 DIAGNOSIS — E87.1 HYPONATREMIA: ICD-10-CM

## 2018-01-02 DIAGNOSIS — J10.1 INFLUENZA A: ICD-10-CM

## 2018-01-02 DIAGNOSIS — R11.2 NAUSEA AND VOMITING, INTRACTABILITY OF VOMITING NOT SPECIFIED, UNSPECIFIED VOMITING TYPE: ICD-10-CM

## 2018-01-02 LAB
ALBUMIN SERPL-MCNC: 3.9 G/DL (ref 3.4–5)
ALBUMIN UR-MCNC: NEGATIVE MG/DL
ALP SERPL-CCNC: 58 U/L (ref 40–150)
ALT SERPL W P-5'-P-CCNC: 34 U/L (ref 0–50)
ANION GAP SERPL CALCULATED.3IONS-SCNC: 11 MMOL/L (ref 3–14)
APPEARANCE UR: CLEAR
AST SERPL W P-5'-P-CCNC: 33 U/L (ref 0–45)
BACTERIA #/AREA URNS HPF: ABNORMAL /HPF
BASOPHILS # BLD AUTO: 0 10E9/L (ref 0–0.2)
BASOPHILS NFR BLD AUTO: 0.3 %
BILIRUB SERPL-MCNC: 0.4 MG/DL (ref 0.2–1.3)
BILIRUB UR QL STRIP: NEGATIVE
BUN SERPL-MCNC: 8 MG/DL (ref 7–30)
CALCIUM SERPL-MCNC: 8.6 MG/DL (ref 8.5–10.1)
CHLORIDE SERPL-SCNC: 84 MMOL/L (ref 94–109)
CO2 SERPL-SCNC: 27 MMOL/L (ref 20–32)
COLOR UR AUTO: COLORLESS
CREAT SERPL-MCNC: 0.63 MG/DL (ref 0.52–1.04)
DIFFERENTIAL METHOD BLD: ABNORMAL
EOSINOPHIL # BLD AUTO: 0 10E9/L (ref 0–0.7)
EOSINOPHIL NFR BLD AUTO: 0 %
ERYTHROCYTE [DISTWIDTH] IN BLOOD BY AUTOMATED COUNT: 12.6 % (ref 10–15)
FLUAV+FLUBV AG SPEC QL: NEGATIVE
FLUAV+FLUBV AG SPEC QL: POSITIVE
GFR SERPL CREATININE-BSD FRML MDRD: >90 ML/MIN/1.7M2
GLUCOSE SERPL-MCNC: 108 MG/DL (ref 70–99)
GLUCOSE UR STRIP-MCNC: NEGATIVE MG/DL
HCT VFR BLD AUTO: 39.3 % (ref 35–47)
HGB BLD-MCNC: 13.4 G/DL (ref 11.7–15.7)
HGB UR QL STRIP: ABNORMAL
IMM GRANULOCYTES # BLD: 0 10E9/L (ref 0–0.4)
IMM GRANULOCYTES NFR BLD: 0.3 %
INTERPRETATION ECG - MUSE: NORMAL
KETONES UR STRIP-MCNC: 5 MG/DL
LEUKOCYTE ESTERASE UR QL STRIP: NEGATIVE
LIPASE SERPL-CCNC: 145 U/L (ref 73–393)
LYMPHOCYTES # BLD AUTO: 0.4 10E9/L (ref 0.8–5.3)
LYMPHOCYTES NFR BLD AUTO: 10.8 %
MCH RBC QN AUTO: 26.4 PG (ref 26.5–33)
MCHC RBC AUTO-ENTMCNC: 34.1 G/DL (ref 31.5–36.5)
MCV RBC AUTO: 77 FL (ref 78–100)
MONOCYTES # BLD AUTO: 0.5 10E9/L (ref 0–1.3)
MONOCYTES NFR BLD AUTO: 13 %
NEUTROPHILS # BLD AUTO: 2.7 10E9/L (ref 1.6–8.3)
NEUTROPHILS NFR BLD AUTO: 75.6 %
NITRATE UR QL: NEGATIVE
NRBC # BLD AUTO: 0 10*3/UL
NRBC BLD AUTO-RTO: 0 /100
PH UR STRIP: 7 PH (ref 5–7)
PLATELET # BLD AUTO: 234 10E9/L (ref 150–450)
POTASSIUM SERPL-SCNC: 3.1 MMOL/L (ref 3.4–5.3)
PROT SERPL-MCNC: 7.4 G/DL (ref 6.8–8.8)
RBC # BLD AUTO: 5.08 10E12/L (ref 3.8–5.2)
RBC #/AREA URNS AUTO: 3 /HPF (ref 0–2)
SODIUM SERPL-SCNC: 122 MMOL/L (ref 133–144)
SOURCE: ABNORMAL
SP GR UR STRIP: 1 (ref 1–1.03)
SPECIMEN SOURCE: ABNORMAL
TROPONIN I SERPL-MCNC: <0.015 UG/L (ref 0–0.04)
UROBILINOGEN UR STRIP-MCNC: 0 MG/DL (ref 0–2)
WBC # BLD AUTO: 3.5 10E9/L (ref 4–11)
WBC #/AREA URNS AUTO: 0 /HPF (ref 0–2)

## 2018-01-02 PROCEDURE — 99219 ZZC INITIAL OBSERVATION CARE,LEVL II: CPT | Performed by: INTERNAL MEDICINE

## 2018-01-02 PROCEDURE — 25000125 ZZHC RX 250: Performed by: EMERGENCY MEDICINE

## 2018-01-02 PROCEDURE — 87804 INFLUENZA ASSAY W/OPTIC: CPT | Mod: 91 | Performed by: EMERGENCY MEDICINE

## 2018-01-02 PROCEDURE — 83690 ASSAY OF LIPASE: CPT | Performed by: EMERGENCY MEDICINE

## 2018-01-02 PROCEDURE — 25000128 H RX IP 250 OP 636: Performed by: EMERGENCY MEDICINE

## 2018-01-02 PROCEDURE — 93005 ELECTROCARDIOGRAM TRACING: CPT

## 2018-01-02 PROCEDURE — 84484 ASSAY OF TROPONIN QUANT: CPT | Performed by: EMERGENCY MEDICINE

## 2018-01-02 PROCEDURE — 85025 COMPLETE CBC W/AUTO DIFF WBC: CPT | Performed by: EMERGENCY MEDICINE

## 2018-01-02 PROCEDURE — 96361 HYDRATE IV INFUSION ADD-ON: CPT

## 2018-01-02 PROCEDURE — 25000132 ZZH RX MED GY IP 250 OP 250 PS 637: Performed by: INTERNAL MEDICINE

## 2018-01-02 PROCEDURE — 36415 COLL VENOUS BLD VENIPUNCTURE: CPT | Performed by: INTERNAL MEDICINE

## 2018-01-02 PROCEDURE — 81001 URINALYSIS AUTO W/SCOPE: CPT | Performed by: EMERGENCY MEDICINE

## 2018-01-02 PROCEDURE — 99285 EMERGENCY DEPT VISIT HI MDM: CPT | Mod: 25

## 2018-01-02 PROCEDURE — 96360 HYDRATION IV INFUSION INIT: CPT

## 2018-01-02 PROCEDURE — 84132 ASSAY OF SERUM POTASSIUM: CPT | Performed by: INTERNAL MEDICINE

## 2018-01-02 PROCEDURE — 25000132 ZZH RX MED GY IP 250 OP 250 PS 637: Performed by: EMERGENCY MEDICINE

## 2018-01-02 PROCEDURE — 84295 ASSAY OF SERUM SODIUM: CPT | Mod: 91 | Performed by: INTERNAL MEDICINE

## 2018-01-02 PROCEDURE — G0378 HOSPITAL OBSERVATION PER HR: HCPCS

## 2018-01-02 PROCEDURE — 80053 COMPREHEN METABOLIC PANEL: CPT | Performed by: EMERGENCY MEDICINE

## 2018-01-02 PROCEDURE — 25000128 H RX IP 250 OP 636: Performed by: INTERNAL MEDICINE

## 2018-01-02 RX ORDER — POTASSIUM CHLORIDE 29.8 MG/ML
20 INJECTION INTRAVENOUS
Status: DISCONTINUED | OUTPATIENT
Start: 2018-01-02 | End: 2018-01-03 | Stop reason: HOSPADM

## 2018-01-02 RX ORDER — TRAZODONE HYDROCHLORIDE 50 MG/1
50 TABLET, FILM COATED ORAL
Status: DISCONTINUED | OUTPATIENT
Start: 2018-01-02 | End: 2018-01-03 | Stop reason: HOSPADM

## 2018-01-02 RX ORDER — POTASSIUM CL/LIDO/0.9 % NACL 10MEQ/0.1L
10 INTRAVENOUS SOLUTION, PIGGYBACK (ML) INTRAVENOUS
Status: DISCONTINUED | OUTPATIENT
Start: 2018-01-02 | End: 2018-01-03 | Stop reason: HOSPADM

## 2018-01-02 RX ORDER — ONDANSETRON 4 MG/1
4 TABLET, ORALLY DISINTEGRATING ORAL ONCE
Status: COMPLETED | OUTPATIENT
Start: 2018-01-02 | End: 2018-01-02

## 2018-01-02 RX ORDER — NALOXONE HYDROCHLORIDE 0.4 MG/ML
.1-.4 INJECTION, SOLUTION INTRAMUSCULAR; INTRAVENOUS; SUBCUTANEOUS
Status: DISCONTINUED | OUTPATIENT
Start: 2018-01-02 | End: 2018-01-03 | Stop reason: HOSPADM

## 2018-01-02 RX ORDER — POTASSIUM CHLORIDE 7.45 MG/ML
10 INJECTION INTRAVENOUS
Status: DISCONTINUED | OUTPATIENT
Start: 2018-01-02 | End: 2018-01-03 | Stop reason: HOSPADM

## 2018-01-02 RX ORDER — POTASSIUM CHLORIDE 1500 MG/1
20-40 TABLET, EXTENDED RELEASE ORAL
Status: DISCONTINUED | OUTPATIENT
Start: 2018-01-02 | End: 2018-01-03 | Stop reason: HOSPADM

## 2018-01-02 RX ORDER — ZOLPIDEM TARTRATE 5 MG/1
5 TABLET ORAL
Status: DISCONTINUED | OUTPATIENT
Start: 2018-01-02 | End: 2018-01-02

## 2018-01-02 RX ORDER — ONDANSETRON 4 MG/1
4 TABLET, ORALLY DISINTEGRATING ORAL EVERY 6 HOURS PRN
Status: DISCONTINUED | OUTPATIENT
Start: 2018-01-02 | End: 2018-01-03 | Stop reason: HOSPADM

## 2018-01-02 RX ORDER — SODIUM CHLORIDE 9 MG/ML
INJECTION, SOLUTION INTRAVENOUS CONTINUOUS
Status: DISCONTINUED | OUTPATIENT
Start: 2018-01-02 | End: 2018-01-03 | Stop reason: HOSPADM

## 2018-01-02 RX ORDER — LISINOPRIL 10 MG/1
10 TABLET ORAL DAILY
Status: DISCONTINUED | OUTPATIENT
Start: 2018-01-02 | End: 2018-01-03 | Stop reason: HOSPADM

## 2018-01-02 RX ORDER — LORAZEPAM 0.5 MG/1
0.5 TABLET ORAL EVERY 6 HOURS PRN
Status: DISCONTINUED | OUTPATIENT
Start: 2018-01-02 | End: 2018-01-03 | Stop reason: HOSPADM

## 2018-01-02 RX ORDER — POTASSIUM CHLORIDE 1.5 G/1.58G
20-40 POWDER, FOR SOLUTION ORAL
Status: DISCONTINUED | OUTPATIENT
Start: 2018-01-02 | End: 2018-01-03 | Stop reason: HOSPADM

## 2018-01-02 RX ORDER — ONDANSETRON 2 MG/ML
4 INJECTION INTRAMUSCULAR; INTRAVENOUS EVERY 6 HOURS PRN
Status: DISCONTINUED | OUTPATIENT
Start: 2018-01-02 | End: 2018-01-03 | Stop reason: HOSPADM

## 2018-01-02 RX ORDER — ACETAMINOPHEN 650 MG/1
650 SUPPOSITORY RECTAL EVERY 4 HOURS PRN
Status: DISCONTINUED | OUTPATIENT
Start: 2018-01-02 | End: 2018-01-03 | Stop reason: HOSPADM

## 2018-01-02 RX ORDER — POTASSIUM CHLORIDE 1500 MG/1
20 TABLET, EXTENDED RELEASE ORAL ONCE
Status: COMPLETED | OUTPATIENT
Start: 2018-01-02 | End: 2018-01-02

## 2018-01-02 RX ORDER — ACETAMINOPHEN 325 MG/1
650 TABLET ORAL EVERY 4 HOURS PRN
Status: DISCONTINUED | OUTPATIENT
Start: 2018-01-02 | End: 2018-01-03 | Stop reason: HOSPADM

## 2018-01-02 RX ADMIN — ACETAMINOPHEN 650 MG: 325 TABLET, FILM COATED ORAL at 19:44

## 2018-01-02 RX ADMIN — POTASSIUM CHLORIDE 20 MEQ: 1500 TABLET, EXTENDED RELEASE ORAL at 16:10

## 2018-01-02 RX ADMIN — LISINOPRIL 10 MG: 10 TABLET ORAL at 21:25

## 2018-01-02 RX ADMIN — SODIUM CHLORIDE: 9 INJECTION, SOLUTION INTRAVENOUS at 21:22

## 2018-01-02 RX ADMIN — POTASSIUM CHLORIDE 40 MEQ: 1500 TABLET, EXTENDED RELEASE ORAL at 19:45

## 2018-01-02 RX ADMIN — ONDANSETRON 4 MG: 4 TABLET, ORALLY DISINTEGRATING ORAL at 11:42

## 2018-01-02 RX ADMIN — SODIUM CHLORIDE 1000 ML: 9 INJECTION, SOLUTION INTRAVENOUS at 16:09

## 2018-01-02 ASSESSMENT — ENCOUNTER SYMPTOMS
HEMATURIA: 0
VOMITING: 1
DYSURIA: 0
DIARRHEA: 0
NAUSEA: 1
ABDOMINAL PAIN: 1

## 2018-01-02 NOTE — ED NOTES
[facility]  ED Nurse Handoff Report    Mercedes Lema is a 57 year old female   ED Chief complaint: Nausea & Vomiting and Generalized Weakness  . ED Diagnosis:   Final diagnoses:   Hyponatremia   Nausea and vomiting, intractability of vomiting not specified, unspecified vomiting type   Influenza A     Allergies:   Allergies   Allergen Reactions     Augmentin GI Disturbance     Tequin GI Disturbance       Code Status: Full Code  Activity level - Baseline/Home:  Independent. Activity Level - Current:   Independent. Lift room needed: No. Bariatric: No   Needed: No   Isolation: No. Infection: Not Applicable.     Vital Signs:   Vitals:    01/02/18 1140 01/02/18 1400 01/02/18 1415 01/02/18 1430   BP: (!) 154/106 (!) 141/93 (!) 160/103 (!) 154/96   Pulse:       Resp:       Temp:       TempSrc:       SpO2: 100% 99% 98% 98%       Cardiac Rhythm:  ,      Pain level: 0-10 Pain Scale: 7  Patient confused: No. Patient Falls Risk: Yes.   Elimination Status: Has voided   Patient Report - Initial Complaint: Cough, SOB . Focused Assessment:  Respiratory - Respiratory WDL:  WDL except Lung Fields: All Fields Throughout All Lung Fields: clear; equal bilaterally  Tests Performed:   Labs Ordered and Resulted from Time of ED Arrival Up to the Time of Departure from the ED   CBC WITH PLATELETS DIFFERENTIAL - Abnormal; Notable for the following:        Result Value    WBC 3.5 (*)     MCV 77 (*)     MCH 26.4 (*)     Absolute Lymphocytes 0.4 (*)     All other components within normal limits   COMPREHENSIVE METABOLIC PANEL - Abnormal; Notable for the following:     Sodium 122 (*)     Potassium 3.1 (*)     Chloride 84 (*)     Glucose 108 (*)     All other components within normal limits   ROUTINE UA WITH MICROSCOPIC - Abnormal; Notable for the following:     Ketones Urine 5 (*)     Blood Urine Moderate (*)     RBC Urine 3 (*)     Bacteria Urine Few (*)     All other components within normal limits   INFLUENZA A/B  ANTIGEN - Abnormal; Notable for the following:     Influenza A Positive (*)     All other components within normal limits   LIPASE   TROPONIN I     No orders to display     Influenza positive  Treatments provided: fluids, K+ replacement   Family Comments: none  OBS brochure/video discussed/provided to patient:  Yes  ED Medications:   Medications   0.9% sodium chloride BOLUS (1,000 mLs Intravenous New Bag 1/2/18 1609)   ondansetron (ZOFRAN-ODT) ODT tab 4 mg (4 mg Oral Given 1/2/18 1142)   potassium chloride SA (K-DUR/KLOR-CON M) CR tablet 20 mEq (20 mEq Oral Given 1/2/18 1610)     Drips infusing:  No  For the majority of the shift, the patient's behavior Green. Interventions performed were monitor.     Severe Sepsis OR Septic Shock Diagnosis Present: No      ED Nurse Name/Phone Number: Fabian Jordan,   4:59 PM    RECEIVING UNIT ED HANDOFF REVIEW    Above ED Nurse Handoff Report was reviewed: Yes  Reviewed by: Cristian Wren on January 2, 2018 at 5:40 PM

## 2018-01-02 NOTE — TELEPHONE ENCOUNTER
Patient calls.  States that she has a headache of 8/10, not feeling very well today.   Thinks she may have a flu, has body aches, sore throat, fever, not sure how high.  Patient has new onset of htn.  Worried that it is too high.  It is 136/94 with HR 90.  Patient states it is way too high for her.  She is not feeling well and c/o headache, terrible nausea, body aches as the main problems today.  States that she has been barely urinating, still urinating, but OU is less.  She is worried she is dehydrated.  Not able to perform ADLs today.    Was in the ER on 12/31 for htn, has lab work done that revealed low potassium and low sodium.     Due to severe HA with nausea patient was sent to ER again-she has someone to drive her and was in agreement with the plan.    Rachell Ventura RN  Message handled by Nurse Triage.

## 2018-01-02 NOTE — ED PROVIDER NOTES
History     Chief Complaint:  Nausea & Vomiting and Generalized Weakness    The history is provided by the patient.      Mercedes Lema is a 57 year old female who presents with nausea and vomiting. The patient began to feel unwell two weeks ago, believing it to be hypertension from her at-home readings of 150s/90s when previously readings had been 130s/70s (she measures blood pressure regularly). The patient visited Dr. Quintero in [place] on 12/22 at which time she was diagnosed with hypertension and  prescribed Hydrochlorothiazide 12.5 mg. Symptoms did not improve on home measures so she contacted her primary who recommended increasing dose to 25 mg. She increased this dosage 12/31 but experienced headache as well as cold symptoms so she presented to the ER that evening, she was discharged home after evaluation below. Yesterday, the patient experienced onset of nausea and vomiting, having 3-4 episodes in total, with subsequent development of epigastric pain and feeling generally weak and fatigued prompting her visit to the emergency department. Since onset of these symptoms, the patient reports being unable to keep down drink and experiencing slightly looser than typical stool though denies true diarrhea. The patient reports having known sick contacts through her work at [employer]. The patient denies dysuria, hematuria, history of similar problem, or other complaint.      ED Evaluation: 12/31/2017  ECG1:  ECG taken at 1838, ECG read at 1852. Poor quality study.  Normal sinus rhythm  Possible left atrial enlargement  Nonspecific ST abnormality- new since previous 9/7/16  Abnormal ECG  Rate 89 bpm. HI interval 118 ms. QRS duration 82 ms. QT/QTc 390/474 ms. P-R-T axes 75 78 43.     ECG2:  ECG taken at 2022, ECG read at 2053  Normal sinus rhythm  Possible left atrial enlargement  Borderline ECG, but no significant change compared to 9/7/16  Rate 78 bpm. HI interval 118 ms. QRS duration 100 ms. QT/QTc 400/456 ms.  P-R-T axes 64 78 36.     Laboratory:  Istat Basic Met ICa HCT POCT (Collected 2148)  (L), Chloride 91 (L), Glucose 113 (H) o/w WNL (Creatinine 0.8)   CBC: WBC 8.0, HGB 13.3,   BMP:  (L), Potassium 3.2 (L), Chloride 90 (L), Glucose (Collected 1836) 100 (H) o/w WNL (Creatinine 0.78)  Troponin I (Collected 1836) <0.015   Alcohol ethyl: <0.01      Allergies:  Augmentin  Tequin     Medications:    Microzide  Vagifem  Ativan  Flexeril  Fexofenadine    Past Medical History:    Allergic rhinitis  Arterial Sclerosis  Anxiety  Breast cancer  HTN  Atrophic vaginitis    Past Surgical History:    Biopsy  Breast surgery  Laparoscopic hysterectomy supracervical, bilateral salpingo-oophorectomy combined    Family History:    Cervical caner  HTN  Breast cancer    Social History:  Presents alone   Tobacco use: Never  Alcohol use: Yes (4 glasses of red wine per week)  PCP: Tabitha Quintero    Marital Status: Single    Review of Systems   Gastrointestinal: Positive for abdominal pain, nausea and vomiting. Negative for diarrhea (Looser than normal stool).   Genitourinary: Negative for dysuria and hematuria.   All other systems reviewed and are negative.      Physical Exam     Patient Vitals for the past 24 hrs:   BP Temp Temp src Pulse Resp SpO2   01/02/18 1430 (!) 154/96 - - - - 98 %   01/02/18 1415 (!) 160/103 - - - - 98 %   01/02/18 1400 (!) 141/93 - - - - 99 %   01/02/18 1140 (!) 154/106 - - - - 100 %   01/02/18 1137 - 98.1  F (36.7  C) Temporal 91 20 95 %      Physical Exam  Constitutional: The patient is oriented to person, place, and time. Alert and cooperative.  HENT:   Right Ear: External ear normal.   Left Ear: External ear normal.   Nose: Nose normal.   Mouth/Throat: Uvula is midline, oropharynx is clear and moist and mucous membranes are normal. No posterior oropharyngeal edema or erythema.   Eyes: Conjunctivae, EOM and lids are normal. Pupils are equal, round, and reactive to light.   Neck: Trachea normal.  Normal range of motion. Neck supple.   Cardiovascular: Normal rate, regular rhythm, normal heart sounds, and intact distal pulses.    Pulmonary/Chest: Effort normal and breath sounds equal bilaterally. No crackles or wheezing.   Abdominal: Soft. Mild tenderness to palpation in the epigastrium. No rebound and no guarding.   Musculoskeletal: Normal range of motion.  No extremity tenderness or edema.  Neurological: Alert and Oriented. Strength 5/5 in upper and lower extremities bilaterally. Sensation intact to light touch throughout.  Skin: Skin is dry. No rash noted.          Emergency Department Course   ECG (14:21:15):  Rate 79 bpm. CT interval 120. QRS duration 92. QT/QTc 398/456. P-R-T axes 68 85 45. Normal sinus rhythm. Normal ECG. Agree with computer interpretation. Interpreted at 1423 by Pavithra Ferreira MD.     Laboratory:  CBC: WBC 3.5 (L) o/w WNL (HGB 13.4, )   CMP: Sodium 122 (L), Potassium 3.1 (L), Chloride 84 (L),  (H) o/w WNL (Creatinine 0.63)   Lipase: 145   1357: Troponin: <0.015       Influenza A/B Antigen: A positive, B negative    UA: Blood moderate, Ketones 5, RBC 3 (H), Bacteria few, o/w Negative     Interventions:  1142: Zofran 4 mg IV   1609: NS 1L IV Bolus    1610: Potassium chloride 20 mEq PO     Emergency Department Course:  Past medical records, nursing notes, and vitals reviewed.  1339: I performed an exam of the patient and obtained history, as documented above.  IV inserted and blood drawn.   Above interventions provided.   I personally reviewed the laboratory results with the Patient and answered all related questions prior to observation.    Findings and plan explained to the patient who consents to observation.   1630: Discussed the patient with Pavithra MIRELES for Dr. Kowalski, who will place the patient in observation in the observation area.      Impression & Plan      Medical Decision Making:  Mercedes Lema is a 57 year old female with a history of hypertension  who presents to the emergency department for evaluation of nausea, vomiting, generalized weakness. Upon presentation to the ED, the patient is nontoxic appearing. She is hypertensive, but vital signs are otherwise within normal limits and stable. On exam, she is well appearing. She is alert, oriented, and neurologic exam is non focal. Cardiopulmonary exam is unremarkable. On abdominal examination, she does endorse mild tenderness to palpation in the epigastrium. There is no rebound or guarding. The rest of her exam is as mentioned above. EKG was obtained and demonstrates sinus rhythm. There are no concerning acute ischemic changes. Labs were obtained and are as mentioned above. Notably, she does have a mild leukopenia. Influenza A is positive. Sodium is also low at 122 and Potassium is low at 3.1. The patient was given IV fluid and antiemetic. Given the patients history and presentation, I am suspicious the patient's symptoms are secondary to influenza A. The patient is not a high risk patient for complications from influenza, therefore I do not feel there is indication for treatment with Tamiflu at this time. I discussed this thoroughly with the patient and she notes understanding. The patient is also found to be hyponatremic. The patient was recently initiated on Hydrochlorothiazide and increased her dose over the last two days. This is the likely etiology of her hyponatremia and hypokalemia. Given the degree of hyponatremia with generalized weakness, nausea, and vomiting, I do feel that admission is indicated at this time. I discussed the patient with the hospitalist and he agreed to accept the patient at this time. The patient was stable/improved at time of admission.    Diagnosis:    ICD-10-CM    1. Hyponatremia E87.1    2. Nausea and vomiting, intractability of vomiting not specified, unspecified vomiting type R11.2    3. Influenza A J10.1        Disposition:  Admitted to observation.    Faizan TRIPP am  serving as a scribe at 1:39 PM on 1/2/2018 to document services personally performed by Pavithra Ferreira MD based on my observations and the provider's statements to me.    1/2/2018   [facility] EMERGENCY DEPARTMENT      Pavithra Ferreira MD  01/04/18 1540

## 2018-01-02 NOTE — IP AVS SNAPSHOT
MRN:[id number]                      After Visit Summary   1/2/2018    Mercedes Lema    MRN: [id number]           Thank you!     Thank you for choosing [facility] for your care. Our goal is always to provide you with excellent care. Hearing back from our patients is one way we can continue to improve our services. Please take a few minutes to complete the written survey that you may receive in the mail after you visit. If you would like to speak to someone directly about your visit please contact Patient Relations at [phone]. Thank you!          Patient Information     Date Of Birth          1960        About your hospital stay     You were admitted on:  January 2, 2018 You last received care in the:  [facility] Observation Department    You were discharged on:  January 3, 2018        Reason for your hospital stay       You were evaluated in the hospital for low sodium and low potassium. This is thought to be related to the medication you recently started, hydrochlorothiazide. You also tested positive for influenza A. You were given IV fluid hydration overnight and potassium replacement, and on lab recheck your electrolyte abnormalities were resolved. We decided against sending you home on any blood pressure medications as your blood pressures were softer here. Continue to push oral fluids and bland meals at home until you feel all the way better. Check your blood pressure once a day at home and make an appointment with your primary care clinician within the next week.                  Who to Call     For medical emergencies, please call 911.  For non-urgent questions about your medical care, please call your primary care provider or clinic, [phone]          Attending Provider     Provider Specialty    Pavithra Ferreira MD Emergency Medicine    [facility], MD [facility]       Primary Care Provider Office Phone # Fax #    Tabitha Quintero MD  "[phone]       When to contact your care team       Call your primary doctor if you have any of the following: temperature greater than 101, increased shortness of breath or increased pain.                  After Care Instructions     Activity       Your activity upon discharge: activity as tolerated            Diet       Follow this diet upon discharge: Orders Placed This Encounter      Regular Diet Adult                  Follow-up Appointments     Follow-up and recommended labs and tests        Follow up with primary care provider, Tabitha Quintero, within 7 days to evaluate medication change and for hospital follow- up.  No follow up labs or test are needed.                  Your next 10 appointments already scheduled     Jan 05, 2018  8:50 AM CST   [facility]    [address]  [address]  [place] [address] [phone]           Please do not eat 10-12 hours before your appointment if you are coming in fasting for labs on lipids, cholesterol, or glucose (sugar). This does not apply to pregnant women. Water, hot tea and black coffee (with nothing added) are okay. Do not drink other fluids, diet soda or chew gum.                         Pending Results     No orders found for last 3 day(s).            Statement of Approval     Ordered          01/03/18 1000  I have reviewed and agree with all the recommendations and orders detailed in this document.  EFFECTIVE NOW     Approved and electronically signed by:  Theodora Sen PA-C             Admission Information     Date & Time Department Dept. Phone    1/2/2018 [facility] Observation Department [phone]      Your Vitals Were     Blood Pressure Pulse Temperature Respirations Height Weight    90/51 (BP Location: Left arm) 78 98.4  F (36.9  C) (Oral) 18 1.626 m (5' 4\") 53.4 kg (117 lb 12.8 oz)    Last Period Pulse Oximetry BMI (Body Mass Index)             " "2013 99% 20.22 kg/m2         [employer] Information     [employer] lets you send messages to your doctor, view your test results, renew your prescriptions, schedule appointments and more. To sign up, go to [email or web address] . Click on \"Log in\" on the left side of the screen, which will take you to the Welcome page. Then click on \"Sign up Now\" on the right side of the page.     You will be asked to enter the access code listed below, as well as some personal information. Please follow the directions to create your username and password.     Your access code is: [email or web address]  Expires: 3/22/2018  5:08 PM     Your access code will  in 90 days. If you need help or a new code, please call your [place] clinic or [phone].        Care EveryWhere ID     This is your Care EveryWhere ID. This could be used by other organizations to access your [place] medical records  [id number]        Equal Access to Services     [facility] AH: Hadii konrad laura hadasho Soantonyali, waaxda luqadaha, qaybta kaalmada adeegyada, nomi solorio . So [facility] [phone].    ATENCIÓN: Si habla español, tiene a [place] disposición servicios gratuitos de asistencia lingüística. Luis Carlos al [phone].    We comply with applicable federal civil rights laws and [place] laws. We do not discriminate on the basis of race, color, national origin, age, disability, sex, sexual orientation, or gender identity.               Review of your medicines      CONTINUE these medicines which have NOT CHANGED        Dose / Directions    CALCIUM 600 + D PO        daily   Refills:  0       cyclobenzaprine 5 MG tablet   Commonly known as:  FLEXERIL   Used for:  Back muscle spasm        1-2 po tid prn   Quantity:  30 tablet   Refills:  1       estradiol 10 MCG Tabs vaginal tablet   Commonly known as:  VAGIFEM   Used for:  Atrophic vaginitis        Dose:  10 mcg   Place 1 tablet (10 mcg) vaginally twice a week   Quantity:  8 tablet "   Refills:  3       FEXOFENADINE HCL PO        Dose:  180 mg   Take 180 mg by mouth daily as needed for allergies   Refills:  0       LORazepam 0.5 MG tablet   Commonly known as:  ATIVAN   Used for:  Acute stress reaction        Dose:  0.5 mg   Take 1 tablet (0.5 mg) by mouth every 6 hours as needed for anxiety   Quantity:  20 tablet   Refills:  0       Multi-vitamin Tabs tablet   Generic drug:  multivitamin, therapeutic with minerals        1 TABLET DAILY   Refills:  0       TYLENOL PO        Dose:  325-650 mg   Take 325-650 mg by mouth every 6 hours as needed for mild pain or fever   Refills:  0         STOP taking     hydrochlorothiazide 12.5 MG capsule   Commonly known as:  MICROZIDE                    Protect others around you: Learn how to safely use, store and throw away your medicines at www.[employer]mM[employer]k Systemseds.org.             Medication List: This is a list of all your medications and when to take them. Check marks below indicate your daily home schedule. Keep this list as a reference.      Medications           Morning Afternoon Evening Bedtime As Needed    CALCIUM 600 + D PO   daily                                cyclobenzaprine 5 MG tablet   Commonly known as:  FLEXERIL   1-2 po tid prn                                estradiol 10 MCG Tabs vaginal tablet   Commonly known as:  VAGIFEM   Place 1 tablet (10 mcg) vaginally twice a week                                FEXOFENADINE HCL PO   Take 180 mg by mouth daily as needed for allergies                                LORazepam 0.5 MG tablet   Commonly known as:  ATIVAN   Take 1 tablet (0.5 mg) by mouth every 6 hours as needed for anxiety                                Multi-vitamin Tabs tablet   1 TABLET DAILY   Generic drug:  multivitamin, therapeutic with minerals                                TYLENOL PO   Take 325-650 mg by mouth every 6 hours as needed for mild pain or fever   Last time this was given:  650 mg on 1/2/2018  7:44 PM

## 2018-01-02 NOTE — PHARMACY-ADMISSION MEDICATION HISTORY
Admission medication history interview status for this patient is complete. See [facility] admission navigator for allergy information, prior to admission medications and immunization status.     Medication history interview source(s):Patient  Medication history resources (including written lists, pill bottles, clinic record):None    Changes made to PTA medication list:  Added: tylenol  Deleted: none  Changed: fexofenadine from daily to prn    Actions taken by pharmacist (provider contacted, etc):None     Additional medication history information:None    Medication reconciliation/reorder completed by provider prior to medication history? No    Do you take OTC medications (eg tylenol, ibuprofen, fish oil, eye/ear drops, etc)? Y(Y/N)    For patients on insulin therapy: N (Y/N)    Time spent in this activity: 10 min    Prior to Admission medications    Medication Sig Last Dose Taking? Auth Provider   FEXOFENADINE HCL PO Take 180 mg by mouth daily as needed for allergies Past Month at prn Yes Unknown, Entered By History   Acetaminophen (TYLENOL PO) Take 325-650 mg by mouth every 6 hours as needed for mild pain or fever  at prn Yes Unknown, Entered By History   hydrochlorothiazide (MICROZIDE) 12.5 MG capsule Take 2 capsules (25 mg) by mouth daily 1/1/2018 at Unknown time Yes Tabitha Quintero MD   estradiol (VAGIFEM) 10 MCG TABS vaginal tablet Place 1 tablet (10 mcg) vaginally twice a week 12/30/2017 Yes Tabitha Quintero MD   LORazepam (ATIVAN) 0.5 MG tablet Take 1 tablet (0.5 mg) by mouth every 6 hours as needed for anxiety  at prn Yes Chavez Cadena MD   cyclobenzaprine (FLEXERIL) 5 MG tablet 1-2 po tid prn  at prn Yes Jackie Davies MD   CALCIUM 600 + D OR daily Past Week at Unknown time Yes Reported, Patient   MULTI-VITAMIN OR TABS 1 TABLET DAILY 1/1/2018 at Unknown time Yes Reported, Patient

## 2018-01-02 NOTE — IP AVS SNAPSHOT
[facility] Observation Department    [address]    [facility] [address]    Phone:  [phone]                                       After Visit Summary   1/2/2018    Mercedes Lema    MRN: [id number]           After Visit Summary Signature Page     I have received my discharge instructions, and my questions have been answered. I have discussed any challenges I see with this plan with the nurse or doctor.    ..........................................................................................................................................  Patient/Patient Representative Signature      ..........................................................................................................................................  Patient Representative Print Name and Relationship to Patient    ..................................................               ................................................  Date                                            Time    ..........................................................................................................................................  Reviewed by Signature/Title    ...................................................              ..............................................  Date                                                            Time

## 2018-01-02 NOTE — TELEPHONE ENCOUNTER
Reason for Disposition    [1] MODERATE headache (e.g., interferes with normal activities) AND [2] present > 24 hours AND [3] unexplained  (Exceptions: analgesics not tried, typical migraine, or headache part of viral illness)    Additional Information    Negative: [1] BP  >= 160 / 100 AND [2] cardiac or neurologic symptoms    (e.g., chest pain, difficulty breathing, unsteady gait, blurred vision)    Negative: [1] Pregnant AND [2] new hand or face swelling    Negative: [1] Pregnant > 20 weeks AND [2] BP  >= 140/90    Negative: [1] BP  >= 200/120  AND [2] having NO cardiac or neurologic symptoms    Negative: [1] BP  >= 180/110 AND [2] missed most recent dose of blood pressure medication    Negative: BP  >= 180/110    Negative: Ran out of BP medications    Symptom is main concern  (e.g., headache, chest pain)    Protocols used: HEADACHE-ADULT-AH, HIGH BLOOD PRESSURE-ADULT-AH

## 2018-01-03 VITALS
HEART RATE: 78 BPM | DIASTOLIC BLOOD PRESSURE: 57 MMHG | TEMPERATURE: 98.4 F | RESPIRATION RATE: 18 BRPM | SYSTOLIC BLOOD PRESSURE: 90 MMHG | HEIGHT: 64 IN | OXYGEN SATURATION: 98 % | BODY MASS INDEX: 20.11 KG/M2 | WEIGHT: 117.8 LBS

## 2018-01-03 LAB
LACTATE BLD-SCNC: 0.5 MMOL/L (ref 0.7–2)
POTASSIUM SERPL-SCNC: 3.7 MMOL/L (ref 3.4–5.3)
POTASSIUM SERPL-SCNC: 3.9 MMOL/L (ref 3.4–5.3)
SODIUM SERPL-SCNC: 133 MMOL/L (ref 133–144)
SODIUM SERPL-SCNC: 135 MMOL/L (ref 133–144)

## 2018-01-03 PROCEDURE — 84132 ASSAY OF SERUM POTASSIUM: CPT | Performed by: INTERNAL MEDICINE

## 2018-01-03 PROCEDURE — 84295 ASSAY OF SERUM SODIUM: CPT | Performed by: INTERNAL MEDICINE

## 2018-01-03 PROCEDURE — 36415 COLL VENOUS BLD VENIPUNCTURE: CPT | Performed by: HOSPITALIST

## 2018-01-03 PROCEDURE — G0378 HOSPITAL OBSERVATION PER HR: HCPCS

## 2018-01-03 PROCEDURE — 36415 COLL VENOUS BLD VENIPUNCTURE: CPT | Performed by: INTERNAL MEDICINE

## 2018-01-03 PROCEDURE — 83605 ASSAY OF LACTIC ACID: CPT | Performed by: HOSPITALIST

## 2018-01-03 PROCEDURE — 99217 ZZC OBSERVATION CARE DISCHARGE: CPT | Performed by: PHYSICIAN ASSISTANT

## 2018-01-03 NOTE — PLAN OF CARE
Problem: Patient Care Overview  Goal: Plan of Care/Patient Progress Review  Outcome: Therapy, progress toward functional goals as expected  PRIMARY DIAGNOSIS: GASTROENTERITIS    OUTPATIENT/OBSERVATION GOALS TO BE MET BEFORE DISCHARGE  1. Orthostatic performed: No    2. Tolerating PO fluid and/or antibiotics (if applicable): Yes    3. Nausea/Vomiting/Diarrhea symptoms improved: Yes    4. Pain status: Pain free.    5. Return to near baseline physical activity: Yes    Discharge Planner Nurse   Safe discharge environment identified: Yes  Barriers to discharge: Yes       Entered by: Pavithra Sanford 01/03/2018 6:08 AM     Please review provider order for any additional goals.   Nurse to notify provider when observation goals have been met and patient is ready for discharge.    JOLIE improving throughout night. 100s/50s. MD aware. Vitals otherwise stable. Denies nausea. SBA. Denies feeling lightheaded or dizzy. Tolerating oral intake. Infrequent nonproductive cough.

## 2018-01-03 NOTE — PROVIDER NOTIFICATION
MD paged regarding BP of 80/46. Sepsis triggered. Started on Lisinopril today. MD reviewing chart. Awaiting orders.

## 2018-01-03 NOTE — PLAN OF CARE
Problem: Patient Care Overview  Goal: Plan of Care/Patient Progress Review  Outcome: Adequate for Discharge Date Met: 01/03/18  Patient's After Visit Summary was reviewed with patient.  Patient verbalized understanding of After Visit Summary, recommended follow up and was given an opportunity to ask questions.   Discharge medications sent home with patient/family: Not applicable,    Discharged with other:family friend    OBSERVATION patient END time: 1147    Patient was stable and ambulatory at discharge.

## 2018-01-03 NOTE — PROGRESS NOTES
Infection Prevention:    Patient requires Droplet precautions because of Influenza. Please contact Infection prevention with any questions/concerns at *[phone].    Samina Hancock, ICP

## 2018-01-03 NOTE — H&P
[facility]  Hospitalist Admission Note  January 2, 2018  Name: Mercedes Lema    MRN: [id number]  YOB: 1960    Age: 57 year old  Date of admission: 1/2/2018  Primary care provider: Tabitha Quintero      Summary:  Patient is a pleasant 57-year-old female who is otherwise healthy who presented here with nausea/vomiting and was found to be hyponatremic.    Problem list  1. Nausea/vomiting: Suspect secondary to hyponatremia  2. Hyponatremia/hypokalemia:  Notably recently started on hydrochlorothiazide for uncontrolled hypertension which I believe is the most likely suspect.    Plan:    Observation admission    We'll start patient on normal saline    Replace potassium per protocol    We'll discontinue hydrochlorothiazide and start patient on lisinopril as patient reports that her cardiologist did tell her that she has some atherosclerosis but no previous MI or unstable angina    We'll start patient on lisinopril 10 mg p.o. daily instead    Will list hydrochlorothiazide on allergy list      All lab work and imaging data independently reviewed by myself    Prophylaxis;  Low risk/Ambulation.   Discharge: Home tomorrow if sodium corrects and blood pressure stable    Chief Complaint:   Nausea/vomiting   HPI  Mercedes Lema is a 57 year old female who presents with nausea and vomiting. The patient began to feel unwell two weeks ago, believing it to be hypertension from her at-home readings of 150s/90s when previously readings had been 130s/70s (she measures blood pressure regularly). The patient visited Dr. Quintero in [place] on 12/22 at which time she was diagnosed with hypertension and  prescribed Hydrochlorothiazide 12.5 mg. Symptoms did not improve at home so she contacted her primary who recommended increasing dose to 25 mg. She increased this dosage on 12/31 but experienced headache as well as cold symptoms so she presented to the ER that evening. She was evaluated and  discharged home from the ED. Yesterday, the patient experienced onset of nausea and vomiting, having 3-4 episodes in total, with subsequent development of epigastric pain and feeling generally weak and fatigued prompting her visit to the emergency department. Since onset of these symptoms, the patient reports being unable to keep down drink and experiencing slightly looser than typical stool though denies true diarrhea. The patient reports having known sick contacts through her work at [employer]. The patient denies dysuria, hematuria, history of similar problem, or other complaint.  I did discuss the case in detail with the ED physician.     Past Medical History:     Past Medical History:   Diagnosis Date     Allergic rhinitis, cause unspecified      Anxiety state, unspecified     fear of flying (Ativan)     Breast cancer (H) 8/7/2009    Right Breast Cancer     Hypertension      Past Surgical History:     Past Surgical History:   Procedure Laterality Date     BIOPSY  8/7/2009    Right Breast Needle Biopsy     BIOPSY  8/20/2009    MRI Guided Right Breast Biopsy     BIOPSY  8/21/2009    Left Breast US Guided Biopsy     BREAST SURGERY  9/8/2009    Right/Left Lumpectomies     LAPAROSCOPIC HYSTERECTOMY SUPRACERVICAL, BILATERAL SALPINGO-OOPHORECTOMY, COMBINED  2/20/2013    Procedure: COMBINED LAPAROSCOPIC HYSTERECTOMY SUPRACERVICAL, SALPINGO-OOPHORECTOMY;  LAPAROSCOPIC HYSTERECTOMY SUPRACERVICAL, SALPINGO-OOPHORECTOMY (MORCELLATOR, TRIP LOOP)   ;  Surgeon: Suzy Soni MD;  Location: [facility]     Social History:     Social History   Substance Use Topics     Smoking status: Never Smoker     Smokeless tobacco: Never Used     Alcohol use Yes      Comment: Glass of red wine 4 days a week     Family History:  Family history reviewed. NO pertinent family history     Allergies:     Allergies   Allergen Reactions     Augmentin GI Disturbance     Tequin GI Disturbance     Medications:     Prescriptions Prior to  "Admission   Medication Sig Dispense Refill Last Dose     FEXOFENADINE HCL PO Take 180 mg by mouth daily as needed for allergies   Past Month at prn     Acetaminophen (TYLENOL PO) Take 325-650 mg by mouth every 6 hours as needed for mild pain or fever    at prn     hydrochlorothiazide (MICROZIDE) 12.5 MG capsule Take 2 capsules (25 mg) by mouth daily 30 capsule 0 1/1/2018 at Unknown time     estradiol (VAGIFEM) 10 MCG TABS vaginal tablet Place 1 tablet (10 mcg) vaginally twice a week 8 tablet 3 12/30/2017     LORazepam (ATIVAN) 0.5 MG tablet Take 1 tablet (0.5 mg) by mouth every 6 hours as needed for anxiety 20 tablet 0  at prn     cyclobenzaprine (FLEXERIL) 5 MG tablet 1-2 po tid prn 30 tablet 1  at prn     CALCIUM 600 + D OR daily   Past Week at Unknown time     MULTI-VITAMIN OR TABS 1 TABLET DAILY   1/1/2018 at Unknown time       Review of Systems:   A Comprehensive greater than 10 system review of systems was carried out.  Pertinent positives and negatives are noted above.  Otherwise negative for contributory information.        Physical Exam:  Blood pressure 128/79, pulse 91, temperature 99.5  F (37.5  C), temperature source Oral, resp. rate 12, height 1.626 m (5' 4\"), weight 53.4 kg (117 lb 12.8 oz), last menstrual period 02/08/2013, SpO2 95 %.  Gen: Pleasant in no acute distress.  HEENT: NCAT. EOMI. PERRL.  Neck: Normal inspection. No bruit, JVD or thyromegaly.  Lungs: Normal respiratory effort. Clear to auscultation bilaterally with no crackles or wheezes.  Card: N s1s2. RRR. No M/R/G.  Peripheral pulses present and symmetric.   Skin: No rash. Warm to the touch  Extr: No edema. CMS intact  Psychiatric: Patient alert oriented ×3.  Normal affect  Neurologic: Cranial nerves II-XII are intact.  Sensation normal.  Motor strength 5/5    Data:       Recent Labs  Lab 01/02/18  1357 12/31/17  2148 12/31/17  1836   WBC 3.5*  --  8.0   HGB 13.4 13.6 13.3   HCT 39.3  --  38.9   MCV 77*  --  79     --  269 "       Recent Labs  Lab 01/02/18  1357 12/31/17  2148 12/31/17  1836   * 132* 128*   POTASSIUM 3.1* 3.4 3.2*   CHLORIDE 84* 91* 90*   CO2 27  --  28   ANIONGAP 11 14 10   * 113* 100*   BUN 8 11 14   CR 0.63  --  0.78   GFRESTIMATED >90 74 77   GFRESTBLACK >90 >90 >90   JEANETH 8.6  --  9.0   PROTTOTAL 7.4  --   --    ALBUMIN 3.9  --   --    BILITOTAL 0.4  --   --    ALKPHOS 58  --   --    AST 33  --   --    ALT 34  --   --        Imaging:   No results found for this or any previous visit (from the past 24 hour(s)).    Juve Kowalski MD Pager [phone]

## 2018-01-03 NOTE — PLAN OF CARE
Problem: Patient Care Overview  Goal: Plan of Care/Patient Progress Review  Outcome: Improving  PRIMARY DIAGNOSIS: Influenza A, Hyponatremia, hypokalemia    OUTPATIENT/OBSERVATION GOALS TO BE MET BEFORE DISCHARGE  1. Orthostatic performed: N/A    2. Tolerating PO medications: Yes    3. Return to near baseline physical activity: Yes    4. Cleared for discharge by consultants (if involved): N/A    Discharge Planner Nurse   Safe discharge environment identified: Yes  Barriers to discharge: No       Entered by: Karen Chatman 01/03/2018 9:15 AM     Please review provider order for any additional goals.   Nurse to notify provider when observation goals have been met and patient is ready for discharge.    Patient is alert and oriented. Patient denies SOB but says she is coughing more today. Patient's potassium was replaced and is now 3.9 this am, and Na was 135 this am. Patient is up with SBA. Patient is not on tamiflu and says she told ED doc she did not want it after hearing side effects. I held lisinopril this am d/t BP 90/51. Will continue to monitor.

## 2018-01-03 NOTE — PLAN OF CARE
Problem: Patient Care Overview  Goal: Plan of Care/Patient Progress Review  Outcome: No Change  PRIMARY DIAGNOSIS: GASTROENTERITIS-N/V, Influenza A    OUTPATIENT/OBSERVATION GOALS TO BE MET BEFORE DISCHARGE  1. Orthostatic performed: No    2. Tolerating PO fluid and/or antibiotics (if applicable): sips of water    3. Nausea/Vomiting/Diarrhea symptoms improved: Yes-last emesis this am per pt report-denies nausea, no diarrhea    4. Pain status: Improved-controlled with oral pain medications.    5. Return to near baseline physical activity: Yes    Discharge Planner Nurse   Safe discharge environment identified: Yes  Barriers to discharge: No, not once medically cleared       Entered by: Cristian Wren 01/02/2018 10:24 PM     A  & 0 x 4. Temp 99.4, o/w VSS on RA.   Denies dyspnea, nausea, last emesis this am per pt report.  Abd soft, non distended, +BS.  Taking sips of water.  K 3.1-replaced and re-check sched for 0000.  Rating headache and upper abd pain at 6/10-given Tylenol with tolerable relief.    Plan is for IVFs, Na lab q 6 hours, replace K as per MAR, anti-emetics and pain control.  Droplet precautions initiated, continue to monitor and provide supportive cares.      Please review provider order for any additional goals.   Nurse to notify provider when observation goals have been met and patient is ready for discharge.

## 2018-01-03 NOTE — PROGRESS NOTES
ROOM # 229    Living Situation (if not independent, order SW consult): alone, ind  Facility name:  : [facility], [phone]    Activity level at baseline: Ind  Activity level on admit: SBA      Patient registered to observation; given Patient Bill of Rights; given the opportunity to ask questions about observation status and their plan of care.  Patient has been oriented to the observation room, bathroom and call light is in place.    Discussed discharge goals and expectations with patient/family.

## 2018-01-03 NOTE — DISCHARGE SUMMARY
[facility]  Outpatient/Observation Unit  Discharge Summary        Patient ID:  Mercedes Lema  [id number]  57 year old  1960    Admit date: 1/2/2018    Discharge date and time: 1/3/2018     Admitting Provider: Juve Kowalski MD    Discharge Provider: Theodora Sen PA-C    Admission Diagnoses:  Hyponatremia [E87.1]  Influenza A [J10.1]  Nausea and vomiting, intractability of vomiting not specified, unspecified vomiting type [R11.2]    Discharge Diagnoses: Hyponatremia secondary to diuretic use, hypokalemia secondary to GI loss; Influenza A    Admission Condition: fair    Discharged Condition: good    Hospital Course: Ms. Lema is an otherwise quite healthy 57 year old female with a past medical history who presented with nausea, vomiting, and abnormal electrolytes. Two weeks ago she was experiencing malaise and was having elevated BP readings at home, so she was started on hctz by her PCP on 12/22/2017. Her symptoms did not improve at home so she contacted her primary who recommended increasing her hctz dose to 25 mg daily on 12/31/2017. However, she started having a headache and cold symptoms, so she was evaluated in the ED the evening of 12/31/2017. She discharged home from the ED, but then two days ago she developed nausea, vomiting, myalgias, chills, and fatigue so she returned to the ED. In the ED, VSS, Na 122, K 3.1, Cl 84, o/w workup rather unremarkable. She was registered to observation and placed on IV fluids and potassium replacement protocol. Her hctz was held. She was started on lisinopril to help manage her blood pressure instead. However, her blood pressures were quite soft throughout the night so lisinopril was held. She was tested for influenza and was positive for influenza A. Starting tamiflu was discussed with the patient and she declined starting this medication after reviewing the medication's side effect profile. Symptomatically she improved overnight. Her Na improved to 133  and her K improved to 3.7. She was able to ambulate without dizziness. She was able to tolerate advancement of her diet. Decided with patient that further blood pressure medications for now will be held. She will check her blood pressure once daily at home with her at-home cuff and record these findings for follow up with her primary care clinician within the week.      Reason for your hospital stay       You were evaluated in the hospital for low sodium and low potassium. This   is thought to be related to the medication you recently started,   hydrochlorothiazide. You also tested positive for influenza A. You were   given IV fluid hydration overnight and potassium replacement, and on lab   recheck your electrolyte abnormalities were resolved. We decided against   sending you home on any blood pressure medications as your blood pressures   were softer here. Continue to push oral fluids and bland meals at home   until you feel all the way better. Check your blood pressure once a day at   home and make an appointment with your primary care clinician within the   next week.                  Consults: none    Significant Diagnostic Studies:   Results for orders placed or performed during the hospital encounter of 01/02/18 (from the past 48 hour(s))   Influenza A/B antigen   Result Value Ref Range    Influenza A/B Agn Specimen Nasal     Influenza A Positive (A) NEG^Negative    Influenza B Negative NEG^Negative   CBC + differential   Result Value Ref Range    WBC 3.5 (L) 4.0 - 11.0 10e9/L    RBC Count 5.08 3.8 - 5.2 10e12/L    Hemoglobin 13.4 11.7 - 15.7 g/dL    Hematocrit 39.3 35.0 - 47.0 %    MCV 77 (L) 78 - 100 fl    MCH 26.4 (L) 26.5 - 33.0 pg    MCHC 34.1 31.5 - 36.5 g/dL    RDW 12.6 10.0 - 15.0 %    Platelet Count 234 150 - 450 10e9/L    Diff Method Automated Method     % Neutrophils 75.6 %    % Lymphocytes 10.8 %    % Monocytes 13.0 %    % Eosinophils 0.0 %    % Basophils 0.3 %    % Immature Granulocytes 0.3 %     Nucleated RBCs 0 0 /100    Absolute Neutrophil 2.7 1.6 - 8.3 10e9/L    Absolute Lymphocytes 0.4 (L) 0.8 - 5.3 10e9/L    Absolute Monocytes 0.5 0.0 - 1.3 10e9/L    Absolute Eosinophils 0.0 0.0 - 0.7 10e9/L    Absolute Basophils 0.0 0.0 - 0.2 10e9/L    Abs Immature Granulocytes 0.0 0 - 0.4 10e9/L    Absolute Nucleated RBC 0.0    Comprehensive metabolic panel   Result Value Ref Range    Sodium 122 (L) 133 - 144 mmol/L    Potassium 3.1 (L) 3.4 - 5.3 mmol/L    Chloride 84 (L) 94 - 109 mmol/L    Carbon Dioxide 27 20 - 32 mmol/L    Anion Gap 11 3 - 14 mmol/L    Glucose 108 (H) 70 - 99 mg/dL    Urea Nitrogen 8 7 - 30 mg/dL    Creatinine 0.63 0.52 - 1.04 mg/dL    GFR Estimate >90 >60 mL/min/1.7m2    GFR Estimate If Black >90 >60 mL/min/1.7m2    Calcium 8.6 8.5 - 10.1 mg/dL    Bilirubin Total 0.4 0.2 - 1.3 mg/dL    Albumin 3.9 3.4 - 5.0 g/dL    Protein Total 7.4 6.8 - 8.8 g/dL    Alkaline Phosphatase 58 40 - 150 U/L    ALT 34 0 - 50 U/L    AST 33 0 - 45 U/L   Lipase   Result Value Ref Range    Lipase 145 73 - 393 U/L   Troponin I   Result Value Ref Range    Troponin I ES <0.015 0.000 - 0.045 ug/L   EKG 12 lead   Result Value Ref Range    Interpretation ECG Click View Image link to view waveform and result    UA with Microscopic   Result Value Ref Range    Color Urine Colorless     Appearance Urine Clear     Glucose Urine Negative NEG^Negative mg/dL    Bilirubin Urine Negative NEG^Negative    Ketones Urine 5 (A) NEG^Negative mg/dL    Specific Gravity Urine 1.003 1.003 - 1.035    Blood Urine Moderate (A) NEG^Negative    pH Urine 7.0 5.0 - 7.0 pH    Protein Albumin Urine Negative NEG^Negative mg/dL    Urobilinogen mg/dL 0.0 0.0 - 2.0 mg/dL    Nitrite Urine Negative NEG^Negative    Leukocyte Esterase Urine Negative NEG^Negative    Source Midstream Urine     WBC Urine 0 0 - 2 /HPF    RBC Urine 3 (H) 0 - 2 /HPF    Bacteria Urine Few (A) NEG^Negative /HPF   Sodium (Every 6 Hrs)   Result Value Ref Range    Sodium 133 133 - 144 mmol/L    Potassium   Result Value Ref Range    Potassium 3.7 3.4 - 5.3 mmol/L   Lactic acid level STAT   Result Value Ref Range    Lactic Acid 0.5 (L) 0.7 - 2.0 mmol/L   Sodium (Every 6 Hrs)   Result Value Ref Range    Sodium 135 133 - 144 mmol/L   Potassium   Result Value Ref Range    Potassium 3.9 3.4 - 5.3 mmol/L     Treatments: IV hydration, potassium replacement    Discharge Exam:    B/P: 90/57, T: 98.4, P: 78, R: 18    GENERAL:  Comfortable.  PSYCH: pleasant, oriented, No acute distress.  HEENT:  Atraumatic, normocephalic. PERRLA. Normal conjunctiva, normal hearing, and oropharynx is normal.  NECK:  Supple, no neck vein distention, adenopathy or bruits, normal thyroid.  HEART:  Normal S1, S2 with no murmur, no pericardial rub, gallops or S3 or S4.  LUNGS:  Clear to auscultation, normal respiratory effort. No wheezing, rales or ronchi.  GI:  Soft, no hepatosplenomegaly, normal bowel sounds. Non-tender, non distended.   EXTREMITIES:  No pedal edema, +2 pulses bilateral and equal.  SKIN:  Dry to touch, No rash, wound or ulcerations.  NEUROLOGIC:  CN 2-12 intact, BL 5/5 symmetric upper and lower extremity strength, sensation is intact with no focal deficits.     Pending Studies:    Unresulted Labs Ordered in the Past 30 Days of this Admission     No orders found for last 61 day(s).         Disposition: home    Patient Instructions:        Review of your medicines      CONTINUE these medicines which have NOT CHANGED       Dose / Directions    CALCIUM 600 + D PO        daily   Refills:  0       cyclobenzaprine 5 MG tablet   Commonly known as:  FLEXERIL   Used for:  Back muscle spasm        1-2 po tid prn   Quantity:  30 tablet   Refills:  1       estradiol 10 MCG Tabs vaginal tablet   Commonly known as:  VAGIFEM   Used for:  Atrophic vaginitis        Dose:  10 mcg   Place 1 tablet (10 mcg) vaginally twice a week   Quantity:  8 tablet   Refills:  3       FEXOFENADINE HCL PO        Dose:  180 mg   Take 180 mg by mouth daily  as needed for allergies   Refills:  0       LORazepam 0.5 MG tablet   Commonly known as:  ATIVAN   Used for:  Acute stress reaction        Dose:  0.5 mg   Take 1 tablet (0.5 mg) by mouth every 6 hours as needed for anxiety   Quantity:  20 tablet   Refills:  0       Multi-vitamin Tabs tablet   Generic drug:  multivitamin, therapeutic with minerals        1 TABLET DAILY   Refills:  0       TYLENOL PO        Dose:  325-650 mg   Take 325-650 mg by mouth every 6 hours as needed for mild pain or fever   Refills:  0         STOP taking          hydrochlorothiazide 12.5 MG capsule   Commonly known as:  MICROZIDE               Activity: activity as tolerated    Active Diet Order      Diet  Wound Care: none needed    Follow-up with PCP in 1 week.    Signed:  Theodora Sen PA-C

## 2018-01-04 ENCOUNTER — TELEPHONE (OUTPATIENT)
Dept: PEDIATRICS | Facility: CLINIC | Age: 58
End: 2018-01-04

## 2018-01-04 NOTE — TELEPHONE ENCOUNTER
"ED / Discharge Outreach Protocol    Patient Contact    Attempt # 1    Was call answered?  Yes.  \"May I please speak with <patient name>\"  Is patient available?   Yes    Hospital/TCU/ED for chronic condition Discharge Protocol    \"Hi, my name is Rachell [place], a registered nurse, and I am calling from [facility].  I am calling to follow up and see how things are going for you after your recent emergency visit/hospital/TCU stay.\"    Tell me how you are doing now that you are home?\" better, drinking a lot of fluids, checking BP at home and they took her of hctz as this was a reason for her low sodium and low K-not on any htn medication at the moment.      Discharge Instructions    \"Let's review your discharge instructions.  What is/are the follow-up recommendations?  Pt. Response: appointment with PCP in a week    \"Has an appointment with your primary care provider been scheduled?\"   No (schedule appointment)     Appointment scheduled:     Next 5 appointments (look out 90 days)     Jan 12, 2018 11:30 AM SANJUANITA QUINTERO with Tabitha Quintero MD   [facility]    [address] [place] [id number] [phone]                  \"When you see the provider, I would recommend that you bring your medications with you.\"    Medications    \"Tell me what changed about your medicines when you discharged?\"    Changes to chronic meds?    Yes, off htn medication, not on any htn medication now     \"What questions do you have about your medications?\"    None     New diagnoses of heart failure, COPD, diabetes, or MI?    No                  Medication reconciliation completed? Yes  Was MTM referral placed (*Make sure to put transitions as reason for referral)?   No    Call Summary    \"What questions or concerns do you have about your recent visit and your follow-up care?\"     none    \"If you have questions or things don't continue to improve, we encourage you contact " "us through the main clinic number (give number).  Even if the clinic is not open, triage nurses are available 24/7 to help you.     We would like you to know that our clinic has extended hours (provide information).  We also have urgent care (provide details on closest location and hours/contact info)\"      \"Thank you for your time and take care!\"           "

## 2018-01-04 NOTE — TELEPHONE ENCOUNTER
Please contact patient for Emergency Department follow up.  [phone] (home) [phone] (work)    Visit date: 01/03/2018  Diagnosis listed:Hyponatremia  Number of visits in past 12 months:1

## 2018-01-19 ENCOUNTER — OFFICE VISIT (OUTPATIENT)
Dept: PEDIATRICS | Facility: CLINIC | Age: 58
End: 2018-01-19
Payer: COMMERCIAL

## 2018-01-19 VITALS
HEART RATE: 80 BPM | DIASTOLIC BLOOD PRESSURE: 86 MMHG | BODY MASS INDEX: 20.55 KG/M2 | WEIGHT: 119.7 LBS | SYSTOLIC BLOOD PRESSURE: 130 MMHG | TEMPERATURE: 98 F

## 2018-01-19 DIAGNOSIS — N95.2 ATROPHIC VAGINITIS: ICD-10-CM

## 2018-01-19 DIAGNOSIS — A08.4 VIRAL GASTROENTERITIS: ICD-10-CM

## 2018-01-19 DIAGNOSIS — E87.1 HYPONATREMIA: ICD-10-CM

## 2018-01-19 DIAGNOSIS — I10 BENIGN ESSENTIAL HYPERTENSION: Primary | ICD-10-CM

## 2018-01-19 PROCEDURE — 80048 BASIC METABOLIC PNL TOTAL CA: CPT | Performed by: INTERNAL MEDICINE

## 2018-01-19 PROCEDURE — 36415 COLL VENOUS BLD VENIPUNCTURE: CPT | Performed by: INTERNAL MEDICINE

## 2018-01-19 PROCEDURE — 99214 OFFICE O/P EST MOD 30 MIN: CPT | Performed by: INTERNAL MEDICINE

## 2018-01-19 RX ORDER — ESTRADIOL 10 UG/1
10 INSERT VAGINAL
Qty: 36 TABLET | Refills: 3 | Status: SHIPPED | OUTPATIENT
Start: 2018-01-19 | End: 2018-09-14

## 2018-01-19 NOTE — PROGRESS NOTES
SUBJECTIVE:   Mercedes Lema is a 57 year old female who presents to clinic today for the following health issues:      Hospital Follow-up Visit:    Hospital/Nursing Home/IP Rehab Facility: [facility]  Date of Admission: 1-2-18  Date of Discharge: 1-3-18  Reason(s) for Admission: N/V            Problems taking medications regularly:  None       Medication changes since discharge: None       Problems adhering to non-medication therapy:  None    Summary of hospitalization:  [facility] discharge summary reviewed  Diagnostic Tests/Treatments reviewed.  Follow up needed: none  Other Healthcare Providers Involved in Patient s Care:         None  Update since discharge: improved.   exercises 3x/wk for 20 mins    Post Discharge Medication Reconciliation: discharge medications reconciled, continue medications without change.  Plan of care communicated with patient     Coding guidelines for this visit:  Type of Medical   Decision Making Face-to-Face Visit       within 7 Days of discharge Face-to-Face Visit        within 14 days of discharge   Moderate Complexity [id number]   High Complexity 33347 [id number]                  Problem list and histories reviewed & adjusted, as indicated.  Additional history: as documented    Labs reviewed in EPIC    Reviewed and updated as needed this visit by clinical staff  Tobacco  Allergies  Meds  Problems  Med Hx  Surg Hx  Fam Hx  Soc Hx        Reviewed and updated as needed this visit by Provider  Allergies  Meds  Problems         ROS:  Reports ongoing vaginal discomfort with intercourse.  Vagifem with some benefit.  Constitutional, HEENT, cardiovascular, pulmonary, gi and gu systems are negative, except as otherwise noted.      OBJECTIVE:   /86  Pulse 80  Temp 98  F (36.7  C) (Oral)  Wt 119 lb 11.2 oz (54.3 kg)  LMP 02/08/2013  BMI 20.55 kg/m2  Body mass index is 20.55 kg/(m^2).  GENERAL: healthy, alert and no distress        ASSESSMENT/PLAN:        1. Benign essential hypertension  Controlled off medications.  Continue to monitor at home and notify if rises.  Follow-up for physical in summer  - Basic metabolic panel    2. Hyponatremia  Rechecked.  Likely related to thiazide in setting of viral gastro.  Recheck today  - Basic metabolic panel    3. Atrophic vaginitis  Refilled, increase frequency.  discussed use  - estradiol (VAGIFEM) 10 MCG TABS vaginal tablet; Place 1 tablet (10 mcg) vaginally three times a week  Dispense: 36 tablet; Refill: 3    4. Viral gastroenteritis  Resolved.      See Patient Instructions    Tabitha Quintero MD  [facility]    The 10-year ASCVD risk score (Valorie ESTEFANIA Jr, et al., 2013) is: 1.4%    Values used to calculate the score:      Age: 57 years      Sex: Female      Is Non- : No      Diabetic: No      Tobacco smoker: No      Systolic Blood Pressure: 130 mmHg      Is BP treated: No      HDL Cholesterol: 64 mg/dL      Total Cholesterol: 162 mg/dL

## 2018-01-19 NOTE — LETTER
[facility]  [address] [place]  [place] [address]                  [phone]   January 23, 2018    Mercedes Lema  [address]  [facility] [address]      Dear Mercedes,    Here is a summary of your recent test results:    Your electrolytes, kidney function and blood sugar are normal.  Please monitor your blood pressure regularly and let me know if it is regularly >140/90.  Please follow-up for your physical this summer.  Please let us know if you have any questions or concerns.              Thank you very much for choosing [facility]    Best regards,    Tabitha Quintero MD        Results for orders placed or performed in visit on 01/19/18   Basic metabolic panel   Result Value Ref Range    Sodium 138 133 - 144 mmol/L    Potassium 4.5 3.4 - 5.3 mmol/L    Chloride 103 94 - 109 mmol/L    Carbon Dioxide 32 20 - 32 mmol/L    Anion Gap 3 3 - 14 mmol/L    Glucose 89 70 - 99 mg/dL    Urea Nitrogen 17 7 - 30 mg/dL    Creatinine 0.81 0.52 - 1.04 mg/dL    GFR Estimate 73 >60 mL/min/1.7m2    GFR Estimate If Black 88 >60 mL/min/1.7m2    Calcium 8.5 8.5 - 10.1 mg/dL

## 2018-01-19 NOTE — MR AVS SNAPSHOT
After Visit Summary   1/19/2018    Mercedes Lema    MRN: [id number]           Patient Information     Date Of Birth          1960        Visit Information        Provider Department      1/19/2018 2:50 PM Tabitha Quintero MD [facility]3]an        Today's Diagnoses     Benign essential hypertension    -  1    Hyponatremia        Atrophic vaginitis          Care Instructions      Eating Heart-Healthy Food: Using the DASH Plan    Eating for your heart doesn t have to be hard or boring. You just need to know how to make healthier choices. The DASH eating plan has been developed to help you do just that. DASH stands for Dietary Approaches to Stop Hypertension. It is a plan that has been proven to be healthier for your heart and to lower your risk for high blood pressure. It can also help lower your risk for cancer, heart disease, osteoporosis, and diabetes.  Choosing from each food group  Choose foods from each of the food groups below each day. Try to get the recommended number of servings for each food group. The serving numbers are based on a diet of 2,000 calories a day. Talk to your doctor if you re unsure about your calorie needs. Along with getting the correct servings, the DASH plan also recommends a sodium intake less than 2,300 mg per day.        Grains  Servings: 6 to 8 a day  A serving is:    1 slice bread    1 ounce dry cereal    Half a cup cooked rice, pasta or cereal  Best choices: Whole grains and any grains high in fiber. Vegetables  Servings: 4 to 5 a day  A serving is:    1 cup raw leafy vegetable    Half a cup cut-up raw or cooked vegetable    Half a cup vegetable juice  Best choices: Fresh or frozen vegetables prepared without added salt or fat.   Fruits  Servings: 4 to 5 a day  A serving is:    1 medium fruit    One-quarter cup dried fruit    Half a cup fresh, frozen, or canned fruit    Half a cup of 100% fruit juices  Best choices: A variety of fresh fruits of  different colors. Whole fruits are a better choice than fruit juices. Low-fat or fat-free dairy  Servings: 2 to 3 a day  A serving is:    1 cup milk    1 cup yogurt    One and a half ounces cheese  Best choices: Skim or 1% milk, low-fat or fat-free yogurt or buttermilk, and low-fat cheeses.         Lean meats, poultry, fish  Servings: 6 or fewer a day  A serving is:    1 ounce cooked meats, poultry, or fish    1 egg  Best choices: Lean poultry and fish. Trim away visible fat. Broil, grill, roast, or boil instead of frying. Remove skin from poultry before eating. Limit how much red meat you eat.  Nuts, seeds, beans  Servings: 4 to 5 a week  A serving is:    One-third cup nuts (one and a half ounces)    2 tablespoons nut butter or seeds    Half a cup cooked dry beans or legumes  Best choices: Dry roasted nuts with no salt added, lentils, kidney beans, garbanzo beans, and whole anguiano beans.   Fats and oils  Servings: 2 to 3 a day  A serving is:    1 teaspoon vegetable oil    1 teaspoon soft margarine    1 tablespoon mayonnaise    2 tablespoons salad dressing  Best choices: Nut and vegetable oils (nontropical vegetable oils), such as olive and canola oil. Sweets  Servings: 5 a week or fewer  A serving is:    1 tablespoon sugar, maple syrup, or honey    1 tablespoon jam or jelly    1 half-ounce jelly beans (about 15)    1 cup lemonade  Best choices: Dried fruit can be a satisfying sweet. Choose low-fat sweets. And watch your serving sizes!      For more on the DASH eating plan, visit:  www.nhlbi.nih.gov/health/health-topics/topics/dash   Date Last Reviewed: 6/1/2016 2000-2017 [employer]. [address] [place] [id number]. All rights reserved. This information is not intended as a substitute for professional medical care. Always follow your healthcare professional's instructions.        Controlling High Blood Pressure  High blood pressure (hypertension) is often called the silent killer. This is  because many people who have it don t know it. High blood pressure is defined as 140/90 mm Hg or higher. Know your blood pressure and remember to check it regularly. Doing so can save your life. Here are some things you can do to help control your blood pressure.    Choose heart-healthy foods    Select low-salt, low-fat foods. Limit sodium intake to 2,400 mg per day or the amount suggested by your healthcare provider.    Limit canned, dried, cured, packaged, and fast foods. These can contain a lot of salt.    Eat 8 to 10 servings of fruits and vegetables every day.    Choose lean meats, fish, or chicken.    Eat whole-grain pasta, brown rice, and beans.    Eat 2 to 3 servings of low-fat or fat-free dairy products.    Ask your doctor about the DASH eating plan. This plan helps reduce blood pressure.    When you go to a restaurant, ask that your meal be prepared with no added salt.  Maintain a healthy weight    Ask your healthcare provider how many calories to eat a day. Then stick to that number.    Ask your healthcare provider what weight range is healthiest for you. If you are overweight, a weight loss of only 3% to 5% of your body weight can help lower blood pressure. Generally, a good weight loss goal is to lose 10% of your body weight in a year.    Limit snacks and sweets.    Get regular exercise.  Get up and get active    Choose activities you enjoy. Find ones you can do with friends or family. This includes bicycling, dancing, walking, and jogging.    Park farther away from building entrances.    Use stairs instead of the elevator.    When you can, walk or bike instead of driving.    [place] leaves, garden, or do household repairs.    Be active at a moderate to vigorous level of physical activity for at least 40 minutes for a minimum of 3 to 4 days a week.   Manage stress    Make time to relax and enjoy life. Find time to laugh.    Communicate your concerns with your loved ones and your healthcare  provider.    Visit with family and friends, and keep up with hobbies.  Limit alcohol and quit smoking    Men should have no more than 2 drinks per day.    Women should have no more than 1 drink per day.    Talk with your healthcare provider about quitting smoking. Smoking significantly increases your risk for heart disease and stroke. Ask your healthcare provider about community smoking cessation programs and other options.  Medicines  If lifestyle changes aren t enough, your healthcare provider may prescribe high blood pressure medicine. Take all medicines as prescribed. If you have any questions about your medicines, ask your healthcare provider before stopping or changing them.   Date Last Reviewed: 4/27/2016 2000-2017 [employer]. [address] [place] [address], [place] [address]. All rights reserved. This information is not intended as a substitute for professional medical care. Always follow your healthcare professional's instructions.                Follow-ups after your visit        Follow-up notes from your care team     Return in about 7 months (around 8/19/2018) for Physical Exam.      Who to contact     If you have questions or need follow up information about today's clinic visit or your schedule please contact [facility] directly at [phone].  Normal or non-critical lab and imaging results will be communicated to you by MyChart, letter or phone within 4 business days after the clinic has received the results. If you do not hear from us within 7 days, please contact the clinic through [employer] or phone. If you have a critical or abnormal lab result, we will notify you by phone as soon as possible.  Submit refill requests through [employer] or call your pharmacy and they will forward the refill request to us. Please allow 3 business days for your refill to be completed.          Additional Information About Your Visit        [employer] Information     [employer] lets you send messages to  "your doctor, view your test results, renew your prescriptions, schedule appointments and more. To sign up, go to [email or web address] . Click on \"Log in\" on the left side of the screen, which will take you to the Welcome page. Then click on \"Sign up Now\" on the right side of the page.     You will be asked to enter the access code listed below, as well as some personal information. Please follow the directions to create your username and password.     Your access code is: [email or web address]  Expires: 3/22/2018  5:08 PM     Your access code will  in 90 days. If you need help or a new code, please call your [facility] clinic or [phone].        Care EveryWhere ID     This is your Care EveryWhere ID. This could be used by other organizations to access your [facility] medical records  [id number]        Your Vitals Were     Pulse Temperature Last Period BMI (Body Mass Index)          80 98  F (36.7  C) (Oral) 2013 20.55 kg/m2         Blood Pressure from Last 3 Encounters:   18 130/86   18 90/57   17 (!) 140/94    Weight from Last 3 Encounters:   18 119 lb 11.2 oz (54.3 kg)   18 117 lb 12.8 oz (53.4 kg)   17 120 lb (54.4 kg)              We Performed the Following     Basic metabolic panel          Today's Medication Changes          These changes are accurate as of: 18  4:21 PM.  If you have any questions, ask your nurse or doctor.               These medicines have changed or have updated prescriptions.        Dose/Directions    estradiol 10 MCG Tabs vaginal tablet   Commonly known as:  VAGIFEM   This may have changed:  when to take this   Used for:  Atrophic vaginitis   Changed by:  Tabitha Quintero MD        Dose:  10 mcg   Place 1 tablet (10 mcg) vaginally three times a week   Quantity:  36 tablet   Refills:  3            Where to get your medicines      These medications were sent to [facility] [place], [place] - [address] [facility] [facility] [address], [place] [address]     Phone:  [phone]     estradiol 10 MCG Tabs vaginal tablet                Primary Care Provider Office Phone # Fax #    Tabitha Quintero [phone] [address] [place] [address]        Equal Access to Services     [facility]: Hadii aad ku hadasho Soomaali, waaxda luqadaha, qaybta kaalmada adeegyada, waxay idiin hayaan adeaki cruz laophelian ah. So [facility] [phone].    ATENCIÓN: Si habla español, tiene a [place] disposición servicios gratuitos de asistencia lingüística. [facility] [phone].    We comply with applicable federal civil rights laws and [place] laws. We do not discriminate on the basis of race, color, national origin, age, disability, sex, sexual orientation, or gender identity.            Thank you!     Thank you for choosing [facility]  for your care. Our goal is always to provide you with excellent care. Hearing back from our patients is one way we can continue to improve our services. Please take a few minutes to complete the written survey that you may receive in the mail after your visit with us. Thank you!             Your Updated Medication List - Protect others around you: Learn how to safely use, store and throw away your medicines at www.disposemymeds.org.          This list is accurate as of: 1/19/18  4:21 PM.  Always use your most recent med list.                   Brand Name Dispense Instructions for use Diagnosis    CALCIUM 600 + D PO      daily        cyclobenzaprine 5 MG tablet    FLEXERIL    30 tablet    1-2 po tid prn    Back muscle spasm       estradiol 10 MCG Tabs vaginal tablet    VAGIFEM    36 tablet    Place 1 tablet (10 mcg) vaginally three times a week    Atrophic vaginitis       FEXOFENADINE HCL PO      Take 180 mg by mouth daily as needed for allergies        LORazepam 0.5 MG tablet    ATIVAN    20 tablet    Take 1 tablet (0.5 mg) by mouth every 6 hours as needed for anxiety     Acute stress reaction       Multi-vitamin Tabs tablet   Generic drug:  multivitamin, therapeutic with minerals      1 TABLET DAILY        TYLENOL PO      Take 325-650 mg by mouth every 6 hours as needed for mild pain or fever

## 2018-01-19 NOTE — NURSING NOTE
"Chief Complaint   Patient presents with     Hospital F/U       Initial /86  Pulse 80  Temp 98  F (36.7  C) (Oral)  Wt 119 lb 11.2 oz (54.3 kg)  LMP 02/08/2013  BMI 20.55 kg/m2 Estimated body mass index is 20.55 kg/(m^2) as calculated from the following:    Height as of 1/2/18: 5' 4\" (1.626 m).    Weight as of this encounter: 119 lb 11.2 oz (54.3 kg).  Medication Reconciliation: complete  "

## 2018-01-19 NOTE — PATIENT INSTRUCTIONS
Eating Heart-Healthy Food: Using the DASH Plan    Eating for your heart doesn t have to be hard or boring. You just need to know how to make healthier choices. The DASH eating plan has been developed to help you do just that. DASH stands for Dietary Approaches to Stop Hypertension. It is a plan that has been proven to be healthier for your heart and to lower your risk for high blood pressure. It can also help lower your risk for cancer, heart disease, osteoporosis, and diabetes.  Choosing from each food group  Choose foods from each of the food groups below each day. Try to get the recommended number of servings for each food group. The serving numbers are based on a diet of 2,000 calories a day. Talk to your doctor if you re unsure about your calorie needs. Along with getting the correct servings, the DASH plan also recommends a sodium intake less than 2,300 mg per day.        Grains  Servings: 6 to 8 a day  A serving is:    1 slice bread    1 ounce dry cereal    Half a cup cooked rice, pasta or cereal  Best choices: Whole grains and any grains high in fiber. Vegetables  Servings: 4 to 5 a day  A serving is:    1 cup raw leafy vegetable    Half a cup cut-up raw or cooked vegetable    Half a cup vegetable juice  Best choices: Fresh or frozen vegetables prepared without added salt or fat.   Fruits  Servings: 4 to 5 a day  A serving is:    1 medium fruit    One-quarter cup dried fruit    Half a cup fresh, frozen, or canned fruit    Half a cup of 100% fruit juices  Best choices: A variety of fresh fruits of different colors. Whole fruits are a better choice than fruit juices. Low-fat or fat-free dairy  Servings: 2 to 3 a day  A serving is:    1 cup milk    1 cup yogurt    One and a half ounces cheese  Best choices: Skim or 1% milk, low-fat or fat-free yogurt or buttermilk, and low-fat cheeses.         Lean meats, poultry, fish  Servings: 6 or fewer a day  A serving is:    1 ounce cooked meats, poultry, or fish    1  egg  Best choices: Lean poultry and fish. Trim away visible fat. Broil, grill, roast, or boil instead of frying. Remove skin from poultry before eating. Limit how much red meat you eat.  Nuts, seeds, beans  Servings: 4 to 5 a week  A serving is:    One-third cup nuts (one and a half ounces)    2 tablespoons nut butter or seeds    Half a cup cooked dry beans or legumes  Best choices: Dry roasted nuts with no salt added, lentils, kidney beans, garbanzo beans, and whole anguiano beans.   Fats and oils  Servings: 2 to 3 a day  A serving is:    1 teaspoon vegetable oil    1 teaspoon soft margarine    1 tablespoon mayonnaise    2 tablespoons salad dressing  Best choices: Nut and vegetable oils (nontropical vegetable oils), such as olive and canola oil. Sweets  Servings: 5 a week or fewer  A serving is:    1 tablespoon sugar, maple syrup, or honey    1 tablespoon jam or jelly    1 half-ounce jelly beans (about 15)    1 cup lemonade  Best choices: Dried fruit can be a satisfying sweet. Choose low-fat sweets. And watch your serving sizes!      For more on the DASH eating plan, visit:  www.nhlbi.nih.gov/health/health-topics/topics/dash   Date Last Reviewed: 6/1/2016 2000-2017 [employer]. [address] [place] [address]. All rights reserved. This information is not intended as a substitute for professional medical care. Always follow your healthcare professional's instructions.        Controlling High Blood Pressure  High blood pressure (hypertension) is often called the silent killer. This is because many people who have it don t know it. High blood pressure is defined as 140/90 mm Hg or higher. Know your blood pressure and remember to check it regularly. Doing so can save your life. Here are some things you can do to help control your blood pressure.    Choose heart-healthy foods    Select low-salt, low-fat foods. Limit sodium intake to 2,400 mg per day or the amount suggested by your healthcare  provider.    Limit canned, dried, cured, packaged, and fast foods. These can contain a lot of salt.    Eat 8 to 10 servings of fruits and vegetables every day.    Choose lean meats, fish, or chicken.    Eat whole-grain pasta, brown rice, and beans.    Eat 2 to 3 servings of low-fat or fat-free dairy products.    Ask your doctor about the DASH eating plan. This plan helps reduce blood pressure.    When you go to a restaurant, ask that your meal be prepared with no added salt.  Maintain a healthy weight    Ask your healthcare provider how many calories to eat a day. Then stick to that number.    Ask your healthcare provider what weight range is healthiest for you. If you are overweight, a weight loss of only 3% to 5% of your body weight can help lower blood pressure. Generally, a good weight loss goal is to lose 10% of your body weight in a year.    Limit snacks and sweets.    Get regular exercise.  Get up and get active    Choose activities you enjoy. Find ones you can do with friends or family. This includes bicycling, dancing, walking, and jogging.    Park farther away from building entrances.    Use stairs instead of the elevator.    When you can, walk or bike instead of driving.    [place] leaves, garden, or do household repairs.    Be active at a moderate to vigorous level of physical activity for at least 40 minutes for a minimum of 3 to 4 days a week.   Manage stress    Make time to relax and enjoy life. Find time to laugh.    Communicate your concerns with your loved ones and your healthcare provider.    Visit with family and friends, and keep up with hobbies.  Limit alcohol and quit smoking    Men should have no more than 2 drinks per day.    Women should have no more than 1 drink per day.    Talk with your healthcare provider about quitting smoking. Smoking significantly increases your risk for heart disease and stroke. Ask your healthcare provider about community smoking cessation programs and other options.   Medicines  If lifestyle changes aren t enough, your healthcare provider may prescribe high blood pressure medicine. Take all medicines as prescribed. If you have any questions about your medicines, ask your healthcare provider before stopping or changing them.   Date Last Reviewed: 4/27/2016 2000-2017 [employer]. [address] [place] [address], [place] [address]. All rights reserved. This information is not intended as a substitute for professional medical care. Always follow your healthcare professional's instructions.

## 2018-01-21 LAB
ANION GAP SERPL CALCULATED.3IONS-SCNC: 3 MMOL/L (ref 3–14)
BUN SERPL-MCNC: 17 MG/DL (ref 7–30)
CALCIUM SERPL-MCNC: 8.5 MG/DL (ref 8.5–10.1)
CHLORIDE SERPL-SCNC: 103 MMOL/L (ref 94–109)
CO2 SERPL-SCNC: 32 MMOL/L (ref 20–32)
CREAT SERPL-MCNC: 0.81 MG/DL (ref 0.52–1.04)
GFR SERPL CREATININE-BSD FRML MDRD: 73 ML/MIN/1.7M2
GLUCOSE SERPL-MCNC: 89 MG/DL (ref 70–99)
POTASSIUM SERPL-SCNC: 4.5 MMOL/L (ref 3.4–5.3)
SODIUM SERPL-SCNC: 138 MMOL/L (ref 133–144)

## 2018-03-05 ENCOUNTER — TELEPHONE (OUTPATIENT)
Dept: PEDIATRICS | Facility: CLINIC | Age: 58
End: 2018-03-05

## 2018-03-05 ENCOUNTER — OFFICE VISIT (OUTPATIENT)
Dept: PEDIATRICS | Facility: CLINIC | Age: 58
End: 2018-03-05
Payer: COMMERCIAL

## 2018-03-05 VITALS
BODY MASS INDEX: 20.62 KG/M2 | WEIGHT: 120.8 LBS | SYSTOLIC BLOOD PRESSURE: 128 MMHG | TEMPERATURE: 99.8 F | HEART RATE: 112 BPM | HEIGHT: 64 IN | OXYGEN SATURATION: 97 % | DIASTOLIC BLOOD PRESSURE: 78 MMHG

## 2018-03-05 DIAGNOSIS — N30.01 ACUTE CYSTITIS WITH HEMATURIA: Primary | ICD-10-CM

## 2018-03-05 DIAGNOSIS — R31.0 GROSS HEMATURIA: ICD-10-CM

## 2018-03-05 DIAGNOSIS — N94.10 DYSPAREUNIA IN FEMALE: ICD-10-CM

## 2018-03-05 LAB
ALBUMIN UR-MCNC: >=300 MG/DL
APPEARANCE UR: ABNORMAL
BACTERIA #/AREA URNS HPF: ABNORMAL /HPF
BILIRUB UR QL STRIP: ABNORMAL
COLOR UR AUTO: ABNORMAL
GLUCOSE UR STRIP-MCNC: NEGATIVE MG/DL
HGB UR QL STRIP: ABNORMAL
KETONES UR STRIP-MCNC: ABNORMAL MG/DL
LEUKOCYTE ESTERASE UR QL STRIP: ABNORMAL
NITRATE UR QL: POSITIVE
PH UR STRIP: 8.5 PH (ref 5–7)
RBC #/AREA URNS AUTO: >100 /HPF
SOURCE: ABNORMAL
SP GR UR STRIP: 1.01 (ref 1–1.03)
UROBILINOGEN UR STRIP-ACNC: 0.2 EU/DL (ref 0.2–1)
WBC #/AREA URNS AUTO: ABNORMAL /HPF

## 2018-03-05 PROCEDURE — 99214 OFFICE O/P EST MOD 30 MIN: CPT | Performed by: PHYSICIAN ASSISTANT

## 2018-03-05 PROCEDURE — 87086 URINE CULTURE/COLONY COUNT: CPT | Performed by: PHYSICIAN ASSISTANT

## 2018-03-05 PROCEDURE — 81001 URINALYSIS AUTO W/SCOPE: CPT | Performed by: PHYSICIAN ASSISTANT

## 2018-03-05 RX ORDER — SULFAMETHOXAZOLE/TRIMETHOPRIM 800-160 MG
1 TABLET ORAL 2 TIMES DAILY
Qty: 14 TABLET | Refills: 0 | Status: SHIPPED | OUTPATIENT
Start: 2018-03-05 | End: 2018-05-01

## 2018-03-05 RX ORDER — PHENAZOPYRIDINE HYDROCHLORIDE 200 MG/1
200 TABLET, FILM COATED ORAL 3 TIMES DAILY PRN
Qty: 9 TABLET | Refills: 0 | Status: SHIPPED | OUTPATIENT
Start: 2018-03-05 | End: 2018-09-14

## 2018-03-05 NOTE — MR AVS SNAPSHOT
"              After Visit Summary   3/5/2018    Mercedes Lema    MRN: [id number]           Patient Information     Date Of Birth          1960        Visit Information        Provider Department      3/5/2018 8:50 AM Quinn Honeycutt PA-C [facility]        Today's Diagnoses     Gross hematuria    -  1    Acute cystitis with hematuria        Nonspecific finding on examination of urine          Care Instructions    Begin antibiotics  Urine culture in two days and will contact you with results   Drink lots of fluids          Follow-ups after your visit        Who to contact     If you have questions or need follow up information about today's clinic visit or your schedule please contact [facility] directly at [phone].  Normal or non-critical lab and imaging results will be communicated to you by MyChart, letter or phone within 4 business days after the clinic has received the results. If you do not hear from us within 7 days, please contact the clinic through MyChart or phone. If you have a critical or abnormal lab result, we will notify you by phone as soon as possible.  Submit refill requests through [employer] or call your pharmacy and they will forward the refill request to us. Please allow 3 business days for your refill to be completed.          Additional Information About Your Visit        MyChart Information     [employer] lets you send messages to your doctor, view your test results, renew your prescriptions, schedule appointments and more. To sign up, go to [email or web address] . Click on \"Log in\" on the left side of the screen, which will take you to the Welcome page. Then click on \"Sign up Now\" on the right side of the page.     You will be asked to enter the access code listed below, as well as some personal information. Please follow the directions to create your username and password.     Your access code is: [email or web address]  Expires: 3/22/2018  5:08 PM   " "  Your access code will  in 90 days. If you need help or a new code, please call your [facility] clinic or [phone].        Care EveryWhere ID     This is your Care EveryWhere ID. This could be used by other organizations to access your [facility] medical records  [id number]        Your Vitals Were     Pulse Temperature Height Last Period Pulse Oximetry BMI (Body Mass Index)    112 99.8  F (37.7  C) (Oral) 5' 4\" (1.626 m) 2013 97% 20.74 kg/m2       Blood Pressure from Last 3 Encounters:   18 128/78   18 130/86   18 90/57    Weight from Last 3 Encounters:   18 120 lb 12.8 oz (54.8 kg)   18 119 lb 11.2 oz (54.3 kg)   18 117 lb 12.8 oz (53.4 kg)              We Performed the Following     UA reflex to Microscopic and Culture     Urine Culture Aerobic Bacterial     Urine Microscopic          Today's Medication Changes          These changes are accurate as of 3/5/18  9:49 AM.  If you have any questions, ask your nurse or doctor.               Start taking these medicines.        Dose/Directions    sulfamethoxazole-trimethoprim 800-160 MG per tablet   Commonly known as:  BACTRIM DS/SEPTRA DS   Used for:  Gross hematuria, Acute cystitis with hematuria   Started by:  Quinn Honeycutt PA-C        Dose:  1 tablet   Take 1 tablet by mouth 2 times daily   Quantity:  14 tablet   Refills:  0            Where to get your medicines      These medications were sent to [facility] - [address]   [address]  [address], [place] [address]     Phone:  [phone]     sulfamethoxazole-trimethoprim 800-160 MG per tablet                Primary Care Provider Office Phone # Fax #    Tabitha Quintero [phone]       [address] [place] [address] [address] [place] [address]        Equal Access to Services     MARIANO JONES AH: Hadii aad ku hadasho Soomaali, waaxda luqadaha, qaybta kaalmada ademorales, nomi hines " davidjannettemarcello solorio ah. So [facility] [phone].    ATENCIÓN: Si jesus madison, tiene a [place] disposición servicios gratuitos de asistencia lingüística. Luis Carlos liang [phone].    We comply with applicable federal civil rights laws and [place] laws. We do not discriminate on the basis of race, color, national origin, age, disability, sex, sexual orientation, or gender identity.            Thank you!     Thank you for choosing [facility]  for your care. Our goal is always to provide you with excellent care. Hearing back from our patients is one way we can continue to improve our services. Please take a few minutes to complete the written survey that you may receive in the mail after your visit with us. Thank you!             Your Updated Medication List - Protect others around you: Learn how to safely use, store and throw away your medicines at www.disposemymeds.org.          This list is accurate as of 3/5/18  9:49 AM.  Always use your most recent med list.                   Brand Name Dispense Instructions for use Diagnosis    CALCIUM 600 + D PO      daily        cyclobenzaprine 5 MG tablet    FLEXERIL    30 tablet    1-2 po tid prn    Back muscle spasm       estradiol 10 MCG Tabs vaginal tablet    VAGIFEM    36 tablet    Place 1 tablet (10 mcg) vaginally three times a week    Atrophic vaginitis       FEXOFENADINE HCL PO      Take 180 mg by mouth daily as needed for allergies        LORazepam 0.5 MG tablet    ATIVAN    20 tablet    Take 1 tablet (0.5 mg) by mouth every 6 hours as needed for anxiety    Acute stress reaction       Multi-vitamin Tabs tablet   Generic drug:  multivitamin, therapeutic with minerals      1 TABLET DAILY        sulfamethoxazole-trimethoprim 800-160 MG per tablet    BACTRIM DS/SEPTRA DS    14 tablet    Take 1 tablet by mouth 2 times daily    Gross hematuria, Acute cystitis with hematuria       TYLENOL PO      Take 325-650 mg by mouth every 6 hours as needed for mild pain or fever

## 2018-03-05 NOTE — TELEPHONE ENCOUNTER
Patient having blood in the urine. Having 99.9 degree fever. Pain with urination.  Patient has low back pain but no flank pain. Appointment scheduled.

## 2018-03-05 NOTE — PROGRESS NOTES
"  SUBJECTIVE:   Mercedes Lema is a 58 year old female who presents to clinic today for the following health issues:    URINARY TRACT SYMPTOMS  Onset: yesterday    Description:   Painful urination (Dysuria): YES  Blood in urine (Hematuria): YES  Frequency and urgency  Delay in urine (Hesitency): no     Intensity: severe    Progression of Symptoms:  worsening    Accompanying Signs & Symptoms:  Fever/chills: YES- fever  Flank pain no   Nausea and vomiting: no   Any vaginal symptoms: none  Abdominal/Pelvic Pain: YES- some    History:   History of frequent UTI's: no   History of kidney stones: no   Sexually Active: YES  Possibility of pregnancy: No    Precipitating factors:   none  Therapies Tried and outcome:  OTC advil or tylenol     S/p hysterectomy. Post menopausal. S/p breast cancer.     Estradiol for vaginal atrophy. Sexual intercourse for first time in months. Used lubrication. Patient concerned about tearing, urethral injury, etc.     ROS:  ROS otherwise negative    OBJECTIVE:                                                    /78 (BP Location: Right arm, Cuff Size: Adult Regular)  Pulse 112  Temp 99.8  F (37.7  C) (Oral)  Ht 5' 4\" (1.626 m)  Wt 120 lb 12.8 oz (54.8 kg)  LMP 02/08/2013  SpO2 97%  BMI 20.74 kg/m2  Body mass index is 20.74 kg/(m^2).   GENERAL:  alert, no distress  RESP: lungs clear to auscultation - no rales, no rhonchi, no wheezes  CV: regular rates and rhythm, normal S1 S2, no S3 or S4 and no murmur, no click or rub  ABDOMEN: soft, no tenderness  BACK: no CVAT  :  No external genitalia bleeding, wounds. Vaginal vault wnl without blood or trauma    Diagnostic test results:  Results for orders placed or performed in visit on 03/05/18 (from the past 24 hour(s))   UA reflex to Microscopic and Culture   Result Value Ref Range    Color Urine Red     Appearance Urine Cloudy     Glucose Urine Negative NEG^Negative mg/dL    Bilirubin Urine Small (A) NEG^Negative    Ketones Urine Trace " (A) NEG^Negative mg/dL    Specific Gravity Urine 1.015 1.003 - 1.035    Blood Urine Large (A) NEG^Negative    pH Urine 8.5 (H) 5.0 - 7.0 pH    Protein Albumin Urine >=300 (A) NEG^Negative mg/dL    Urobilinogen Urine 0.2 0.2 - 1.0 EU/dL    Nitrite Urine Positive (A) NEG^Negative    Leukocyte Esterase Urine Small (A) NEG^Negative    Source Midstream Urine    Urine Microscopic   Result Value Ref Range    WBC Urine 25-50 (A) OTO5^0 - 5 /HPF    RBC Urine >100 (A) OTO2^O - 2 /HPF    Bacteria Urine Moderate (A) NEG^Negative /HPF        ASSESSMENT/PLAN:                                                    (N30.01) Acute cystitis with hematuria  (primary encounter diagnosis)  Comment: discussed symptoms in detail. Begin treatment for UTI with UC pending. May adjust medication as needed.   Plan: sulfamethoxazole-trimethoprim (BACTRIM         DS/SEPTRA DS) 800-160 MG per tablet, Urine         Culture Aerobic Bacterial, phenazopyridine         (PYRIDIUM) 200 MG tablet            (R31.0) Gross hematuria  Comment: no trauma or injury. Symptoms due to UTI.  Plan: UA reflex to Microscopic and Culture, Urine         Microscopic, sulfamethoxazole-trimethoprim         (BACTRIM DS/SEPTRA DS) 800-160 MG per tablet            (N94.10) Dyspareunia in female  Comment: likely due to atrophic vaginitis. Continue with topical hormones and be sure to use lubrication.   Plan:       See Patient Instructions    Quinn Honeycutt PA-C  [facility]

## 2018-03-05 NOTE — PATIENT INSTRUCTIONS
Begin antibiotics  Urine culture in two days and will contact you with results   Drink lots of fluids

## 2018-03-06 LAB
BACTERIA SPEC CULT: NORMAL
BACTERIA SPEC CULT: NORMAL
SPECIMEN SOURCE: NORMAL

## 2018-03-07 ENCOUNTER — TELEPHONE (OUTPATIENT)
Dept: PEDIATRICS | Facility: CLINIC | Age: 58
End: 2018-03-07

## 2018-03-07 DIAGNOSIS — R31.0 GROSS HEMATURIA: Primary | ICD-10-CM

## 2018-03-07 NOTE — TELEPHONE ENCOUNTER
"Patient is quite upset with the result and wishes to discuss further with VG.She is concerned that she may have bladder cancer. States she did some sort of \"molecular breast screening\" in the past and was advised by her oncologist that it could cause bladder ca due to radioactive material in the bladder. [phone]  "

## 2018-03-07 NOTE — TELEPHONE ENCOUNTER
Discussed symptoms with patient. Will proceed with antibiotics x one week. Repeat UA to ensure resolution of hematuria. If symptoms persist, will refer to urology for further evaluation.     Patient is quite worried about trauma from intercourse and cancer.     Will discuss in one week.     Quinn Honeycutt PA-C

## 2018-03-14 DIAGNOSIS — R31.0 GROSS HEMATURIA: ICD-10-CM

## 2018-03-14 LAB
ALBUMIN UR-MCNC: NEGATIVE MG/DL
APPEARANCE UR: CLEAR
BACTERIA #/AREA URNS HPF: ABNORMAL /HPF
BILIRUB UR QL STRIP: NEGATIVE
COLOR UR AUTO: YELLOW
GLUCOSE UR STRIP-MCNC: NEGATIVE MG/DL
HGB UR QL STRIP: ABNORMAL
KETONES UR STRIP-MCNC: NEGATIVE MG/DL
LEUKOCYTE ESTERASE UR QL STRIP: NEGATIVE
NITRATE UR QL: NEGATIVE
NON-SQ EPI CELLS #/AREA URNS LPF: ABNORMAL /LPF
PH UR STRIP: 6.5 PH (ref 5–7)
RBC #/AREA URNS AUTO: ABNORMAL /HPF
SOURCE: ABNORMAL
SP GR UR STRIP: <=1.005 (ref 1–1.03)
UROBILINOGEN UR STRIP-ACNC: 0.2 EU/DL (ref 0.2–1)
WBC #/AREA URNS AUTO: ABNORMAL /HPF

## 2018-03-14 PROCEDURE — 81001 URINALYSIS AUTO W/SCOPE: CPT | Performed by: PHYSICIAN ASSISTANT

## 2018-05-01 ENCOUNTER — OFFICE VISIT (OUTPATIENT)
Dept: PEDIATRICS | Facility: CLINIC | Age: 58
End: 2018-05-01
Payer: COMMERCIAL

## 2018-05-01 VITALS
HEIGHT: 64 IN | WEIGHT: 118.5 LBS | OXYGEN SATURATION: 99 % | DIASTOLIC BLOOD PRESSURE: 82 MMHG | HEART RATE: 89 BPM | BODY MASS INDEX: 20.23 KG/M2 | TEMPERATURE: 97.6 F | SYSTOLIC BLOOD PRESSURE: 122 MMHG

## 2018-05-01 DIAGNOSIS — J01.90 ACUTE SINUSITIS WITH SYMPTOMS > 10 DAYS: Primary | ICD-10-CM

## 2018-05-01 PROCEDURE — 99213 OFFICE O/P EST LOW 20 MIN: CPT | Performed by: PHYSICIAN ASSISTANT

## 2018-05-01 RX ORDER — AZITHROMYCIN 250 MG/1
TABLET, FILM COATED ORAL
Qty: 6 TABLET | Refills: 0 | Status: SHIPPED | OUTPATIENT
Start: 2018-05-01 | End: 2018-09-14

## 2018-05-01 NOTE — MR AVS SNAPSHOT
"              After Visit Summary   2018    Mercedes Lema    MRN: [id number]           Patient Information     Date Of Birth          1960        Visit Information        Provider Department      2018 9:10 AM Quinn Honeycutt PA-C [facility]        Today's Diagnoses     Acute sinusitis with symptoms > 10 days    -  1      Care Instructions    Begin antibiotics   Call if symptoms persist             Follow-ups after your visit        Who to contact     If you have questions or need follow up information about today's clinic visit or your schedule please contact [facility] directly at [phone].  Normal or non-critical lab and imaging results will be communicated to you by [employer], letter or phone within 4 business days after the clinic has received the results. If you do not hear from us within 7 days, please contact the clinic through MyChart or phone. If you have a critical or abnormal lab result, we will notify you by phone as soon as possible.  Submit refill requests through [employer] or call your pharmacy and they will forward the refill request to us. Please allow 3 business days for your refill to be completed.          Additional Information About Your Visit        MyChart Information     [employer] lets you send messages to your doctor, view your test results, renew your prescriptions, schedule appointments and more. To sign up, go to [email or web address] . Click on \"Log in\" on the left side of the screen, which will take you to the Welcome page. Then click on \"Sign up Now\" on the right side of the page.     You will be asked to enter the access code listed below, as well as some personal information. Please follow the directions to create your username and password.     Your access code is: [email or web address]  Expires: 2018  9:39 AM     Your access code will  in 90 days. If you need help or a new code, please call your [facility] or " "[phone].        Care EveryWhere ID     This is your Care EveryWhere ID. This could be used by other organizations to access your [facility] medical records  [id number]        Your Vitals Were     Pulse Temperature Height Last Period Pulse Oximetry BMI (Body Mass Index)    89 97.6  F (36.4  C) (Oral) 5' 4\" (1.626 m) 02/08/2013 99% 20.34 kg/m2       Blood Pressure from Last 3 Encounters:   05/01/18 122/82   03/05/18 128/78   01/19/18 130/86    Weight from Last 3 Encounters:   05/01/18 118 lb 8 oz (53.8 kg)   03/05/18 120 lb 12.8 oz (54.8 kg)   01/19/18 119 lb 11.2 oz (54.3 kg)              Today, you had the following     No orders found for display         Today's Medication Changes          These changes are accurate as of 5/1/18  9:39 AM.  If you have any questions, ask your nurse or doctor.               Start taking these medicines.        Dose/Directions    azithromycin 250 MG tablet   Commonly known as:  ZITHROMAX   Used for:  Acute sinusitis with symptoms > 10 days   Started by:  Quinn Honeycutt PA-C        Two tablets first day, then one tablet daily for four days.   Quantity:  6 tablet   Refills:  0            Where to get your medicines      These medications were sent to [facility] - [address]   [address]  [address], [place] [address]     Phone:  [phone]     azithromycin 250 MG tablet                Primary Care Provider Office Phone # Fax #    Tabitha Quintero [phone]       [address] [place] [address] [address] [place] [address]        Equal Access to Services     [facility] AH: Hadii konrad Tellez, brittany joe, nomi retana. So [facility] [phone].    ATENCIÓN: Si habla español, tiene a [place] disposición servicios gratuitos de asistencia lingüística. Llame al [phone].    We comply with applicable federal civil rights laws and [place] laws. We do " not discriminate on the basis of race, color, national origin, age, disability, sex, sexual orientation, or gender identity.            Thank you!     Thank you for choosing [facility]  for your care. Our goal is always to provide you with excellent care. Hearing back from our patients is one way we can continue to improve our services. Please take a few minutes to complete the written survey that you may receive in the mail after your visit with us. Thank you!             Your Updated Medication List - Protect others around you: Learn how to safely use, store and throw away your medicines at www.disposemymeds.org.          This list is accurate as of 5/1/18  9:39 AM.  Always use your most recent med list.                   Brand Name Dispense Instructions for use Diagnosis    azithromycin 250 MG tablet    ZITHROMAX    6 tablet    Two tablets first day, then one tablet daily for four days.    Acute sinusitis with symptoms > 10 days       CALCIUM 600 + D PO      daily        cyclobenzaprine 5 MG tablet    FLEXERIL    30 tablet    1-2 po tid prn    Back muscle spasm       estradiol 10 MCG Tabs vaginal tablet    VAGIFEM    36 tablet    Place 1 tablet (10 mcg) vaginally three times a week    Atrophic vaginitis       FEXOFENADINE HCL PO      Take 180 mg by mouth daily as needed for allergies        LORazepam 0.5 MG tablet    ATIVAN    20 tablet    Take 1 tablet (0.5 mg) by mouth every 6 hours as needed for anxiety    Acute stress reaction       Multi-vitamin Tabs tablet   Generic drug:  multivitamin, therapeutic with minerals      1 TABLET DAILY        phenazopyridine 200 MG tablet    PYRIDIUM    9 tablet    Take 1 tablet (200 mg) by mouth 3 times daily as needed for irritation    Acute cystitis with hematuria       TYLENOL PO      Take 325-650 mg by mouth every 6 hours as needed for mild pain or fever

## 2018-05-01 NOTE — PROGRESS NOTES
"  SUBJECTIVE:   Mercedes Lema is a 58 year old female who presents to clinic today for the following health issues:    Acute Illness   Acute illness concerns: sinuses  Onset: 2 weeks    Fever: YES beginning    Chills/Sweats: Yes begining    Headache (location?): YES- frontal    Sinus Pressure:YES- tender, post-nasal drainage and facial pain    Conjunctivitis:  no    Ear Pain: YES: left    Rhinorrhea: YES- green    Congestion: YES- nasal and chest    Sore Throat: YES- slight     Cough: YES-productive of green sputum    Wheeze: YES- slight    Decreased Appetite: no    Nausea: no    Vomiting: no    Diarrhea:  no    Dysuria/Freq.: no    Fatigue/Achiness: YES- both    Sick/Strep Exposure: YES- colds     Therapies Tried and outcome: nyquil, dayquil, tussin w/expecterant, netti pot, airborne     ROS:  ROS otherwise negative    OBJECTIVE:                                                    /82 (BP Location: Right arm, Cuff Size: Adult Regular)  Pulse 89  Temp 97.6  F (36.4  C) (Oral)  Ht 5' 4\" (1.626 m)  Wt 118 lb 8 oz (53.8 kg)  LMP 02/08/2013  SpO2 99%  BMI 20.34 kg/m2  Body mass index is 20.34 kg/(m^2).   GENERAL: alert, no distress  HENT: ear canals- normal; TMs- normal; Nose- normal; Mouth- no ulcers, no lesions; max sinus tenderness  NECK: no tenderness, no adenopathy  RESP: lungs clear to auscultation - no rales, no rhonchi, no wheezes  CV: regular rates and rhythm, normal S1 S2, no S3 or S4 and no murmur, no click or rub    Diagnostic test results:  No results found for this or any previous visit (from the past 24 hour(s)).     ASSESSMENT/PLAN:                                                    (J01.90) Acute sinusitis with symptoms > 10 days  (primary encounter diagnosis)  Comment: patient very weary of trying any other antibiotics other than azithromycin. She will call if symptoms persist or worsen.   Plan: azithromycin (ZITHROMAX) 250 MG tablet          [facility]

## 2018-06-14 ENCOUNTER — HOSPITAL ENCOUNTER (OUTPATIENT)
Dept: MAMMOGRAPHY | Facility: CLINIC | Age: 58
Discharge: HOME | End: 2018-06-14
Attending: INTERNAL MEDICINE | Admitting: INTERNAL MEDICINE
Payer: COMMERCIAL

## 2018-06-14 DIAGNOSIS — Z12.31 ENCOUNTER FOR SCREENING MAMMOGRAM FOR HIGH-RISK PATIENT: ICD-10-CM

## 2018-06-14 PROCEDURE — 77067 SCR MAMMO BI INCL CAD: CPT

## 2018-09-14 ENCOUNTER — OFFICE VISIT (OUTPATIENT)
Dept: PEDIATRICS | Facility: CLINIC | Age: 58
End: 2018-09-14
Payer: COMMERCIAL

## 2018-09-14 VITALS
DIASTOLIC BLOOD PRESSURE: 80 MMHG | OXYGEN SATURATION: 99 % | HEIGHT: 64 IN | WEIGHT: 118 LBS | SYSTOLIC BLOOD PRESSURE: 122 MMHG | HEART RATE: 77 BPM | TEMPERATURE: 97.5 F | BODY MASS INDEX: 20.14 KG/M2

## 2018-09-14 DIAGNOSIS — Z85.3 PERSONAL HISTORY OF MALIGNANT NEOPLASM OF BREAST: ICD-10-CM

## 2018-09-14 DIAGNOSIS — Z00.00 ROUTINE GENERAL MEDICAL EXAMINATION AT A HEALTH CARE FACILITY: Primary | ICD-10-CM

## 2018-09-14 DIAGNOSIS — Z11.4 SCREENING FOR HIV (HUMAN IMMUNODEFICIENCY VIRUS): ICD-10-CM

## 2018-09-14 DIAGNOSIS — N95.2 ATROPHIC VAGINITIS: ICD-10-CM

## 2018-09-14 PROCEDURE — 99396 PREV VISIT EST AGE 40-64: CPT | Performed by: INTERNAL MEDICINE

## 2018-09-14 RX ORDER — ESTRADIOL 10 UG/1
10 INSERT VAGINAL
Qty: 36 TABLET | Refills: 4 | Status: SHIPPED | OUTPATIENT
Start: 2018-09-14 | End: 2019-09-15

## 2018-09-14 ASSESSMENT — ENCOUNTER SYMPTOMS
ABDOMINAL PAIN: 0
CONSTIPATION: 0
HEMATOCHEZIA: 0
DIARRHEA: 0
DIZZINESS: 0
COUGH: 0
CHILLS: 0
HEMATURIA: 0
EYE PAIN: 0

## 2018-09-14 NOTE — PATIENT INSTRUCTIONS
Preventive Health Recommendations  Female Ages 50 - 64    Yearly exam: See your health care provider every year in order to  o Review health changes.   o Discuss preventive care.    o Review your medicines if your doctor has prescribed any.        If you get Pap tests with HPV test, you only need to test every 5 years, unless you have an abnormal result.  You are due in 2022      Have a mammogram every year.    Have a colonoscopy in 2027    Have a cholesterol test every 5 years, you are due in 2021    Have a diabetes test (fasting glucose) every five years. You are due in 2021      Shots: Get a flu shot each year. Get a tetanus shot every 10 years.  You are due in 2024.  Get the shingles vaccine once you are back - just walk in to our pharmacy to get that.    Nutrition:     Eat at least 5 servings of fruits and vegetables each day.    Eat whole-grain bread, whole-wheat pasta and brown rice instead of white grains and rice.    Get adequate Calcium and Vitamin D.     Lifestyle    Exercise at least 150 minutes a week (30 minutes a day, 5 days a week). This will help you control your weight and prevent disease.    Limit alcohol to one drink per day.    No smoking.     Wear sunscreen to prevent skin cancer.     See your dentist every six months for an exam and cleaning.    See your eye doctor every 1 to 2 years.

## 2018-09-14 NOTE — MR AVS SNAPSHOT
After Visit Summary   9/14/2018    Mercedes Lema    MRN: [id number]           Patient Information     Date Of Birth          1960        Visit Information        Provider Department      9/14/2018 8:30 AM Tabitha Quintero MD [facility] [place]        Today's Diagnoses     Routine general medical examination at a health care facility    -  1    Screening for HIV (human immunodeficiency virus)        Atrophic vaginitis          Care Instructions      Preventive Health Recommendations  Female Ages 50 - 64    Yearly exam: See your health care provider every year in order to  o Review health changes.   o Discuss preventive care.    o Review your medicines if your doctor has prescribed any.        If you get Pap tests with HPV test, you only need to test every 5 years, unless you have an abnormal result.  You are due in 2022      Have a mammogram every year.    Have a colonoscopy in 2027    Have a cholesterol test every 5 years, you are due in 2021    Have a diabetes test (fasting glucose) every five years. You are due in 2021      Shots: Get a flu shot each year. Get a tetanus shot every 10 years.  You are due in 2024.  Get the shingles vaccine once you are back - just walk in to our pharmacy to get that.    Nutrition:     Eat at least 5 servings of fruits and vegetables each day.    Eat whole-grain bread, whole-wheat pasta and brown rice instead of white grains and rice.    Get adequate Calcium and Vitamin D.     Lifestyle    Exercise at least 150 minutes a week (30 minutes a day, 5 days a week). This will help you control your weight and prevent disease.    Limit alcohol to one drink per day.    No smoking.     Wear sunscreen to prevent skin cancer.     See your dentist every six months for an exam and cleaning.    See your eye doctor every 1 to 2 years.            Follow-ups after your visit        Follow-up notes from your care team     Return in about 1 year (around 9/14/2019) for Physical  "Exam.      Who to contact     If you have questions or need follow up information about today's clinic visit or your schedule please contact [facility] directly at [phone].  Normal or non-critical lab and imaging results will be communicated to you by MyChart, letter or phone within 4 business days after the clinic has received the results. If you do not hear from us within 7 days, please contact the clinic through MyChart or phone. If you have a critical or abnormal lab result, we will notify you by phone as soon as possible.  Submit refill requests through D4P or call your pharmacy and they will forward the refill request to us. Please allow 3 business days for your refill to be completed.          Additional Information About Your Visit        [facility] Call Information     D4P lets you send messages to your doctor, view your test results, renew your prescriptions, schedule appointments and more. To sign up, go to [email or web address] . Click on \"Log in\" on the left side of the screen, which will take you to the Welcome page. Then click on \"Sign up Now\" on the right side of the page.     You will be asked to enter the access code listed below, as well as some personal information. Please follow the directions to create your username and password.     Your access code is: [email or web address]  Expires: 2018  9:04 AM     Your access code will  in 90 days. If you need help or a new code, please call your [facility] clinic or [phone].        Care EveryWhere ID     This is your Care EveryWhere ID. This could be used by other organizations to access your [facility] medical records  [id number]        Your Vitals Were     Pulse Temperature Height Last Period Pulse Oximetry BMI (Body Mass Index)    77 97.5  F (36.4  C) (Tympanic) 5' 4\" (1.626 m) 2013 99% 20.25 kg/m2       Blood Pressure from Last 3 Encounters:   18 122/80   18 122/82   18 128/78    Weight from Last 3 " Encounters:   09/14/18 118 lb (53.5 kg)   05/01/18 118 lb 8 oz (53.8 kg)   03/05/18 120 lb 12.8 oz (54.8 kg)              Today, you had the following     No orders found for display         Where to get your medicines      These medications were sent to [facility] [place] - [place] - [address]   [address] [place] [address] [address]     Phone:  [phone]     estradiol 10 MCG Tabs vaginal tablet          Primary Care Provider Office Phone # Fax #    Tabitha Quintero [phone] [address] [place] [address]        Equal Access to Services     [facility]: Hadii konrad Tellez, waben joe, qawen kaalmada vineet, nomi solorio . So [facility] [phone].    ATENCIÓN: Si habla español, tiene a [place] disposición servicios gratuitos de asistencia lingüística. Llame al [phone].    We comply with applicable federal civil rights laws and [place] laws. We do not discriminate on the basis of race, color, national origin, age, disability, sex, sexual orientation, or gender identity.            Thank you!     Thank you for choosing [facility]  for your care. Our goal is always to provide you with excellent care. Hearing back from our patients is one way we can continue to improve our services. Please take a few minutes to complete the written survey that you may receive in the mail after your visit with us. Thank you!             Your Updated Medication List - Protect others around you: Learn how to safely use, store and throw away your medicines at www.disposemymeds.org.          This list is accurate as of 9/14/18  9:05 AM.  Always use your most recent med list.                   Brand Name Dispense Instructions for use Diagnosis    CALCIUM 600 + D PO      daily        cyclobenzaprine 5 MG tablet    FLEXERIL    30 tablet    1-2 po tid prn    Back muscle spasm       estradiol 10 MCG Tabs  vaginal tablet    VAGIFEM    36 tablet    Place 1 tablet (10 mcg) vaginally three times a week    Atrophic vaginitis       FEXOFENADINE HCL PO      Take 180 mg by mouth daily as needed for allergies        LORazepam 0.5 MG tablet    ATIVAN    20 tablet    Take 1 tablet (0.5 mg) by mouth every 6 hours as needed for anxiety    Acute stress reaction       Multi-vitamin Tabs tablet   Generic drug:  multivitamin, therapeutic with minerals      1 TABLET DAILY        TYLENOL PO      Take 325-650 mg by mouth every 6 hours as needed for mild pain or fever

## 2018-09-14 NOTE — PROGRESS NOTES
SUBJECTIVE:   CC: Mercedes Lema is an 58 year old woman who presents for preventive health visit.     Physical   Annual:     Getting at least 3 servings of Calcium per day:  Yes    Bi-annual eye exam:  Yes    Dental care twice a year:  Yes    Sleep apnea or symptoms of sleep apnea:  None    Diet:  Regular (no restrictions)    Frequency of exercise:  4-5 days/week    Duration of exercise:  45-60 minutes (walking the dog)    Taking medications regularly:  Not Applicable    Will get flu shot at work.    Today's PHQ-2 Score:   PHQ-2 ( 1999 Pfizer) 9/14/2018   Q1: Little interest or pleasure in doing things 0   Q2: Feeling down, depressed or hopeless 0   PHQ-2 Score 0   Q1: Little interest or pleasure in doing things Not at all   Q2: Feeling down, depressed or hopeless Not at all   PHQ-2 Score 0       Abuse: Current or Past(Physical, Sexual or Emotional)- No  Do you feel safe in your environment - Yes    Social History   Substance Use Topics     Smoking status: Never Smoker     Smokeless tobacco: Never Used     Alcohol use Yes      Comment: Glass of red wine 4 days a week     Alcohol Use 9/14/2018   If you drink alcohol do you typically have greater than 3 drinks per day OR greater than 7 drinks per week? No       Reviewed orders with patient.  Reviewed health maintenance and updated orders accordingly - Yes  Labs reviewed in [facility]    Patient over age 50, mutual decision to screen reflected in health maintenance.    Pertinent mammograms are reviewed under the imaging tab.  History of abnormal Pap smear: NO - age 30-65 PAP every 5 years with negative HPV co-testing recommended  PAP / HPV Latest Ref Rng & Units 8/25/2017 4/24/2014 8/5/2010   PAP - NIL NIL NIL   HPV 16 DNA NEG:Negative Negative - -   HPV 18 DNA NEG:Negative Negative - -   OTHER HR HPV NEG:Negative Negative - -     Reviewed and updated as needed this visit by clinical staff  Tobacco  Allergies  Meds  Problems  Med Hx  Surg Hx  Fam Hx  Soc Hx  "         Reviewed and updated as needed this visit by Provider  Allergies  Meds  Problems            Review of Systems   Constitutional: Negative for chills.   HENT: Negative for congestion and ear pain.    Eyes: Negative for pain.   Respiratory: Negative for cough.    Cardiovascular: Negative for chest pain.   Gastrointestinal: Negative for abdominal pain, constipation, diarrhea and hematochezia.   Genitourinary: Negative for hematuria.   Neurological: Negative for dizziness.          OBJECTIVE:   /80 (BP Location: Right arm, Patient Position: Chair, Cuff Size: Adult Regular)  Pulse 77  Temp 97.5  F (36.4  C) (Tympanic)  Ht 5' 4\" (1.626 m)  Wt 118 lb (53.5 kg)  LMP 02/08/2013  SpO2 99%  BMI 20.25 kg/m2  Physical Exam   Constitutional: She is oriented to person, place, and time. She appears well-developed and well-nourished. No distress.   HENT:   Right Ear: Tympanic membrane and external ear normal.   Left Ear: Tympanic membrane and external ear normal.   Nose: Nose normal.   Mouth/Throat: Oropharynx is clear and moist. No oropharyngeal exudate.   Eyes: Conjunctivae are normal. Pupils are equal, round, and reactive to light. Right eye exhibits no discharge. Left eye exhibits no discharge.   Neck: Neck supple. No tracheal deviation present. No thyromegaly present.   Cardiovascular: Normal rate, regular rhythm, S1 normal, S2 normal, normal heart sounds and normal pulses.  Exam reveals no S3, no S4 and no friction rub.    No murmur heard.  Pulmonary/Chest: Effort normal and breath sounds normal. No respiratory distress. She has no wheezes. She has no rales.   Abdominal: Soft. Bowel sounds are normal. She exhibits no mass. There is no hepatosplenomegaly. There is no tenderness.   Musculoskeletal: Normal range of motion. She exhibits no edema.   Lymphadenopathy:     She has no cervical adenopathy.   Neurological: She is alert and oriented to person, place, and time. She has normal strength and normal " "reflexes. She exhibits normal muscle tone.   Skin: Skin is warm and dry. No rash noted.   Psychiatric: She has a normal mood and affect. Judgment and thought content normal. Cognition and memory are normal.           ASSESSMENT/PLAN:   1. Routine general medical examination at a health care facility  Routine health education discussed: calcium, diet, exercise, weight, safety.     2. Screening for HIV (human immunodeficiency virus)  Low risk - declines    3. Atrophic vaginitis  refill  - estradiol (VAGIFEM) 10 MCG TABS vaginal tablet; Place 1 tablet (10 mcg) vaginally three times a week  Dispense: 36 tablet; Refill: 4    4. History of breast cancer - continue yearly mammograms    Will be flying this year and will let us know when needs refill of ativan    COUNSELING:  Reviewed preventive health counseling, as reflected in patient instructions    BP Readings from Last 1 Encounters:   09/14/18 122/80     Estimated body mass index is 20.25 kg/(m^2) as calculated from the following:    Height as of this encounter: 5' 4\" (1.626 m).    Weight as of this encounter: 118 lb (53.5 kg).    BP Screening:   Last 3 BP Readings:    BP Readings from Last 3 Encounters:   09/14/18 122/80   05/01/18 122/82   03/05/18 128/78       The following was recommended to the patient:  Re-screen BP within a year and recommended lifestyle modifications       reports that she has never smoked. She has never used smokeless tobacco.      Counseling Resources:  ATP IV Guidelines  Pooled Cohorts Equation Calculator  Breast Cancer Risk Calculator  FRAX Risk Assessment  ICSI Preventive Guidelines  Dietary Guidelines for Americans, 2010  USDA's MyPlate  ASA Prophylaxis  Lung CA Screening    Tabitha Quintero MD  [facility]  Answers for HPI/ROS submitted by the patient on 9/14/2018   PHQ-2 Score: 0    "

## 2018-09-22 PROBLEM — E87.1 HYPONATREMIA: Status: RESOLVED | Noted: 2018-01-02 | Resolved: 2018-09-22

## 2018-09-25 ENCOUNTER — NURSE TRIAGE (OUTPATIENT)
Dept: NURSING | Facility: CLINIC | Age: 58
End: 2018-09-25

## 2018-09-25 NOTE — TELEPHONE ENCOUNTER
Mercedes received a shingles vaccine last night and symptoms today are headache, nausea, and body aches.  Mercedes also has a fever of 101.5 and has questions on vaccine.

## 2019-01-05 NOTE — PLAN OF CARE
Hypertension is unchanged. Continue current treatment regimen. Dietary sodium restriction. Blood pressure will be reassessed at the next regular appointment. Problem: Patient Care Overview  Goal: Plan of Care/Patient Progress Review  Outcome: Therapy, progress toward functional goals as expected  PRIMARY DIAGNOSIS: GASTROENTERITIS    OUTPATIENT/OBSERVATION GOALS TO BE MET BEFORE DISCHARGE  1. Orthostatic performed: No    2. Tolerating PO fluid and/or antibiotics (if applicable): Yes    3. Nausea/Vomiting/Diarrhea symptoms improved: Yes    4. Pain status: Pain free.    5. Return to near baseline physical activity: Yes    Discharge Planner Nurse   Safe discharge environment identified: Yes  Barriers to discharge: Yes       Entered by: Pavithra Sanford 01/03/2018 2:55 AM     Please review provider order for any additional goals.   Nurse to notify provider when observation goals have been met and patient is ready for discharge.    Soft BP. 90s/50s. Will recheck in two hours. Vitals otherwise stable. Denies nausea. SBA. Denies feeling lightheaded or dizzy.

## 2019-04-23 ENCOUNTER — TELEPHONE (OUTPATIENT)
Dept: PEDIATRICS | Facility: CLINIC | Age: 59
End: 2019-04-23

## 2019-04-23 DIAGNOSIS — Z12.31 ENCOUNTER FOR SCREENING MAMMOGRAM FOR BREAST CANCER: Primary | ICD-10-CM

## 2019-04-24 NOTE — TELEPHONE ENCOUNTER
Per chart review patient due annually for Mammography.    Last Mammo was on 6/14/2018 no future orders placed.    Mammo Ordered and patient notified.  Jessa MORAN RN - Triage  [facility]

## 2019-05-04 NOTE — TELEPHONE ENCOUNTER
From: Attila Becerra  To: MUKESH Robles  Sent: 5/3/2019 2:33 PM CDT  Subject: Visit Follow-up Question    Hi, I just read some more on [place] over knives and does NOT seem to be what I am looking for.  That looks like it is for the Keto diet, Is t Patient called back switched to Dr. Quintero for 8/25. Made aware of Dr. Quintero only being here 2 days.    Thanks  Abhay BANEGAS  Team Coodinator

## 2019-07-01 ENCOUNTER — HOSPITAL ENCOUNTER (OUTPATIENT)
Dept: MAMMOGRAPHY | Facility: CLINIC | Age: 59
Discharge: HOME | End: 2019-07-01
Attending: INTERNAL MEDICINE | Admitting: INTERNAL MEDICINE
Payer: COMMERCIAL

## 2019-07-01 DIAGNOSIS — Z12.31 ENCOUNTER FOR SCREENING MAMMOGRAM FOR BREAST CANCER: ICD-10-CM

## 2019-07-01 PROCEDURE — 77063 BREAST TOMOSYNTHESIS BI: CPT

## 2019-08-28 ENCOUNTER — TELEPHONE (OUTPATIENT)
Dept: PEDIATRICS | Facility: CLINIC | Age: 59
End: 2019-08-28

## 2019-08-28 DIAGNOSIS — Z13.220 SCREENING FOR LIPID DISORDERS: Primary | ICD-10-CM

## 2019-08-28 DIAGNOSIS — Z13.1 SCREENING FOR DIABETES MELLITUS: ICD-10-CM

## 2019-08-28 NOTE — TELEPHONE ENCOUNTER
Pt has Annual Physical with Dr Quintero on 10/29/2019.  Pt would like orders for fasting labs to be done week before so results can be reviewed at Physical.  Can leave a detailed message on ([phone].  Thank you.  kiran

## 2019-08-29 NOTE — TELEPHONE ENCOUNTER
Routing to provider to PCP to review labs that may be needed before scheduling lab only appointment.     Blanquita Nair, CMA

## 2019-09-03 NOTE — TELEPHONE ENCOUNTER
Lab only appt has been scheduled for 9/26.     //Diana Tuttle MA// September 3, 2019 9:20 AM

## 2019-09-15 DIAGNOSIS — N95.2 ATROPHIC VAGINITIS: ICD-10-CM

## 2019-09-16 RX ORDER — ESTRADIOL 10 UG/1
10 INSERT VAGINAL
Qty: 36 TABLET | Refills: 3 | Status: SHIPPED | OUTPATIENT
Start: 2019-09-16 | End: 2019-12-27

## 2019-09-16 NOTE — TELEPHONE ENCOUNTER
Routing refill request to provider for review/approval because:  Labs not current: BP  It has been more than 1 year since last OV.    BP Readings from Last 3 Encounters:   09/14/18 122/80   05/01/18 122/82   03/05/18 128/78     Last OV with Dr. Quintero: 9/14/18    Pended 1 month with reminder due for yearly exam.    Provider, writer is not a part of your Dyad - only helping to cover  site refills. If you need to communicate to care team regarding refill, please route this encounter to your care team pool. Thank you.    Maribell Mandel, RN, BSN

## 2019-09-16 NOTE — TELEPHONE ENCOUNTER
"Requested Prescriptions   Pending Prescriptions Disp Refills     estradiol (VAGIFEM) 10 MCG TABS vaginal tablet [Pharmacy Med Name: ESTRADIOL 10MCG TABS]  Last Written Prescription Date:  09/14/2018  Last Fill Quantity: 36 tablet,  # refills: 4   Last Office Visit: 9/14/2018 Tabitha Quintero MD   Future Office Visit:    Next 5 appointments (look out 90 days)    Oct 29, 2019  1:45 PM CDT  Adult Preventative Visit with Tabitha Quintero MD,  [facility] ROOM 18  [facility] [address] [place] [address]  [address]  [facility] [id number] [phone]          36 tablet 4     Sig: KPLACE ONE TABLET VAGINALLY THREE TIMES A WEEK       Hormone Replacement Therapy Failed - 9/15/2019  7:09 PM        Failed - Blood pressure under 140/90 in past 12 months     BP Readings from Last 3 Encounters:   09/14/18 122/80   05/01/18 122/82   03/05/18 128/78           Passed - Recent (12 mo) or future (30 days) visit within the authorizing provider's specialty     Patient had office visit in the last 12 months or has a visit in the next 30 days with authorizing provider or within the authorizing provider's specialty.  See \"Patient Info\" tab in inbasket, or \"Choose Columns\" in Meds & Orders section of the refill encounter.              Passed - Patient has mammogram in past 2 years on file if age 50-75        Passed - Medication is active on med list        Passed - Patient is 18 years of age or older        Passed - No active pregnancy on record        Passed - No positive pregnancy test on record in past 12 months          "

## 2019-09-26 DIAGNOSIS — Z13.220 SCREENING FOR LIPID DISORDERS: ICD-10-CM

## 2019-09-26 DIAGNOSIS — Z13.1 SCREENING FOR DIABETES MELLITUS: ICD-10-CM

## 2019-09-26 LAB
CHOLEST SERPL-MCNC: 175 MG/DL
GLUCOSE SERPL-MCNC: 92 MG/DL (ref 70–99)
HDLC SERPL-MCNC: 64 MG/DL
LDLC SERPL CALC-MCNC: 101 MG/DL
NONHDLC SERPL-MCNC: 111 MG/DL
TRIGL SERPL-MCNC: 52 MG/DL

## 2019-09-26 PROCEDURE — 82947 ASSAY GLUCOSE BLOOD QUANT: CPT | Performed by: INTERNAL MEDICINE

## 2019-09-26 PROCEDURE — 80061 LIPID PANEL: CPT | Performed by: INTERNAL MEDICINE

## 2019-09-26 PROCEDURE — 36415 COLL VENOUS BLD VENIPUNCTURE: CPT | Performed by: INTERNAL MEDICINE

## 2019-10-29 ENCOUNTER — OFFICE VISIT (OUTPATIENT)
Dept: PEDIATRICS | Facility: CLINIC | Age: 59
End: 2019-10-29
Payer: COMMERCIAL

## 2019-10-29 VITALS
SYSTOLIC BLOOD PRESSURE: 132 MMHG | OXYGEN SATURATION: 99 % | HEIGHT: 65 IN | TEMPERATURE: 96.8 F | HEART RATE: 79 BPM | WEIGHT: 120.9 LBS | DIASTOLIC BLOOD PRESSURE: 80 MMHG | BODY MASS INDEX: 20.14 KG/M2

## 2019-10-29 DIAGNOSIS — N95.2 ATROPHIC VAGINITIS: ICD-10-CM

## 2019-10-29 DIAGNOSIS — I10 BENIGN ESSENTIAL HYPERTENSION: ICD-10-CM

## 2019-10-29 DIAGNOSIS — Z00.00 ROUTINE GENERAL MEDICAL EXAMINATION AT A HEALTH CARE FACILITY: Primary | ICD-10-CM

## 2019-10-29 PROCEDURE — 90471 IMMUNIZATION ADMIN: CPT | Performed by: INTERNAL MEDICINE

## 2019-10-29 PROCEDURE — 90682 RIV4 VACC RECOMBINANT DNA IM: CPT | Performed by: INTERNAL MEDICINE

## 2019-10-29 PROCEDURE — 99396 PREV VISIT EST AGE 40-64: CPT | Mod: 25 | Performed by: INTERNAL MEDICINE

## 2019-10-29 SDOH — HEALTH STABILITY: MENTAL HEALTH: HOW OFTEN DO YOU HAVE 6 OR MORE DRINKS ON ONE OCCASION?: NEVER

## 2019-10-29 SDOH — SOCIAL STABILITY: SOCIAL NETWORK
DO YOU BELONG TO ANY CLUBS OR ORGANIZATIONS SUCH AS CHURCH GROUPS UNIONS, FRATERNAL OR ATHLETIC GROUPS, OR SCHOOL GROUPS?: NO

## 2019-10-29 SDOH — HEALTH STABILITY: MENTAL HEALTH
STRESS IS WHEN SOMEONE FEELS TENSE, NERVOUS, ANXIOUS, OR CAN'T SLEEP AT NIGHT BECAUSE THEIR MIND IS TROUBLED. HOW STRESSED ARE YOU?: VERY MUCH

## 2019-10-29 SDOH — SOCIAL STABILITY: SOCIAL NETWORK: HOW OFTEN DO YOU GET TOGETHER WITH FRIENDS OR RELATIVES?: ONCE A WEEK

## 2019-10-29 SDOH — HEALTH STABILITY: MENTAL HEALTH: HOW MANY STANDARD DRINKS CONTAINING ALCOHOL DO YOU HAVE ON A TYPICAL DAY?: 1 OR 2

## 2019-10-29 SDOH — ECONOMIC STABILITY: TRANSPORTATION INSECURITY
IN THE PAST 12 MONTHS, HAS LACK OF TRANSPORTATION KEPT YOU FROM MEETINGS, WORK, OR FROM GETTING THINGS NEEDED FOR DAILY LIVING?: NO

## 2019-10-29 SDOH — HEALTH STABILITY: PHYSICAL HEALTH: ON AVERAGE, HOW MANY MINUTES DO YOU ENGAGE IN EXERCISE AT THIS LEVEL?: 30 MIN

## 2019-10-29 SDOH — ECONOMIC STABILITY: FOOD INSECURITY: WITHIN THE PAST 12 MONTHS, THE FOOD YOU BOUGHT JUST DIDN'T LAST AND YOU DIDN'T HAVE MONEY TO GET MORE.: NEVER TRUE

## 2019-10-29 SDOH — ECONOMIC STABILITY: INCOME INSECURITY: HOW HARD IS IT FOR YOU TO PAY FOR THE VERY BASICS LIKE FOOD, HOUSING, MEDICAL CARE, AND HEATING?: NOT HARD AT ALL

## 2019-10-29 SDOH — HEALTH STABILITY: MENTAL HEALTH: HOW OFTEN DO YOU HAVE A DRINK CONTAINING ALCOHOL?: 2-3 TIMES A WEEK

## 2019-10-29 SDOH — HEALTH STABILITY: PHYSICAL HEALTH: ON AVERAGE, HOW MANY DAYS PER WEEK DO YOU ENGAGE IN MODERATE TO STRENUOUS EXERCISE (LIKE A BRISK WALK)?: 3 DAYS

## 2019-10-29 SDOH — SOCIAL STABILITY: SOCIAL NETWORK: HOW OFTEN DO YOU ATTENT MEETINGS OF THE CLUB OR ORGANIZATION YOU BELONG TO?: NEVER

## 2019-10-29 SDOH — ECONOMIC STABILITY: TRANSPORTATION INSECURITY
IN THE PAST 12 MONTHS, HAS THE LACK OF TRANSPORTATION KEPT YOU FROM MEDICAL APPOINTMENTS OR FROM GETTING MEDICATIONS?: NO

## 2019-10-29 SDOH — ECONOMIC STABILITY: FOOD INSECURITY: WITHIN THE PAST 12 MONTHS, YOU WORRIED THAT YOUR FOOD WOULD RUN OUT BEFORE YOU GOT MONEY TO BUY MORE.: NEVER TRUE

## 2019-10-29 SDOH — SOCIAL STABILITY: SOCIAL NETWORK: ARE YOU MARRIED, WIDOWED, DIVORCED, SEPARATED, NEVER MARRIED, OR LIVING WITH A PARTNER?: DIVORCED

## 2019-10-29 SDOH — SOCIAL STABILITY: SOCIAL NETWORK
IN A TYPICAL WEEK, HOW MANY TIMES DO YOU TALK ON THE PHONE WITH FAMILY, FRIENDS, OR NEIGHBORS?: MORE THAN THREE TIMES A WEEK

## 2019-10-29 SDOH — SOCIAL STABILITY: SOCIAL NETWORK: HOW OFTEN DO YOU ATTEND CHURCH OR RELIGIOUS SERVICES?: NEVER

## 2019-10-29 ASSESSMENT — ENCOUNTER SYMPTOMS
DIARRHEA: 0
FEVER: 0
CONSTIPATION: 0
FREQUENCY: 0
HEMATURIA: 0
COUGH: 0
CHILLS: 0
EYE PAIN: 0
NERVOUS/ANXIOUS: 1
DIZZINESS: 0
ABDOMINAL PAIN: 0
HEMATOCHEZIA: 0

## 2019-10-29 ASSESSMENT — MIFFLIN-ST. JEOR: SCORE: 1126.34

## 2019-10-29 NOTE — PATIENT INSTRUCTIONS
Preventive Health Recommendations  Female Ages 50 - 64    Yearly exam: See your health care provider every year in order to  o Review health changes.   o Discuss preventive care.    o Review your medicines if your doctor has prescribed any.        If you get Pap tests with HPV test, you only need to test every 5 years, unless you have an abnormal result.   You are due in 2022.      Have a mammogram every year.  You are due in July - you can schedule on [employer] or call radiology scheduling at [phone]     Have a colonoscopy in 2027    Have a diabetes test (fasting glucose) every three years. If you are at risk for diabetes, you should have this test more often.       Shots: Get a flu shot each year. Get a tetanus shot every 10 years.  You are due in 2024    Nutrition:     Eat at least 5 servings of fruits and vegetables each day.    Eat whole-grain bread, whole-wheat pasta and brown rice instead of white grains and rice.    Get adequate Calcium and Vitamin D.     Lifestyle    Exercise at least 150 minutes a week (30 minutes a day, 5 days a week). This will help you control your weight and prevent disease.    Limit alcohol to one drink per day.    No smoking.     Wear sunscreen to prevent skin cancer.     See your dentist every six months for an exam and cleaning.    See your eye doctor every 1 to 2 years.    Stress Management:    Carry a list of options to deal with stress all of the time.    Exercise, especially things that are fun and engaging (games, sports, group activities, etc.)    Engage in hobbies.  Consider something new that you've always wanted to do.    Call friends when stressed.  Have a list of people that you can call.     Puzzles, games or other activities that you can pull out for distration.    Progressive relaxation tapes or CD's ([employer]).    Well rounded diet, avoiding simple carbohydrates ans sticking with proteins like nuts.    Try to keep a consistent sleep-wake cycle.     Talk with  "family and friends about stress.  Allowing people to care for you is sometimes the greatest gift you can give them.     Doing a mindfulness based stress reduction (MBSR) course or therapy or reading through resources to help with stress and coping - it has been studied and shown to be beneficial.  https://www.[facility].edu/events/mindfulness-programs is a link to MBSR classes offered by the  at various locations.  http://www.[employer]/programs-and-services/courses/ is similar classes offered by a private group.  \"The Chemistry of Calm\" is a book that can be worked through.    "

## 2019-10-29 NOTE — PROGRESS NOTES
SUBJECTIVE:   CC: Mercedes Lema is an 59 year old woman who presents for preventive health visit.     Healthy Habits:     Getting at least 3 servings of Calcium per day:  Yes    Bi-annual eye exam:  Yes    Dental care twice a year:  Yes    Sleep apnea or symptoms of sleep apnea:  None    Diet:  Regular (no restrictions)    Frequency of exercise:  4-5 days/week    Duration of exercise:  15-30 minutes    Taking medications regularly:  Yes    Medication side effects:  None    PHQ-2 Total Score: 0    Additional concerns today:  Yes    States was trying to be in a cardiology study about 10 yrs ago and wasn't included as was told she had hardening of the arteries.  Has been exercising more since then with intermittent jogging/walking.  BP at home has been 130-138/80-86.    Today's PHQ-2 Score:   PHQ-2 ( 1999 [employer]) 10/29/2019   Q1: Little interest or pleasure in doing things 0   Q2: Feeling down, depressed or hopeless 0   PHQ-2 Score 0   Q1: Little interest or pleasure in doing things Not at all   Q2: Feeling down, depressed or hopeless Not at all   PHQ-2 Score 0       Abuse: Current or Past(Physical, Sexual or Emotional)- No  Do you feel safe in your environment? Yes      Social History     Tobacco Use     Smoking status: Never Smoker     Smokeless tobacco: Never Used   Substance Use Topics     Alcohol use: Yes     Frequency: 2-3 times a week     Drinks per session: 1 or 2     Binge frequency: Never     Comment: Glass of red wine 4 days a week     If you drink alcohol do you typically have >3 drinks per day or >7 drinks per week? No    Alcohol Use 10/29/2019   Prescreen: >3 drinks/day or >7 drinks/week? No   Prescreen: >3 drinks/day or >7 drinks/week? -       Reviewed orders with patient.  Reviewed health maintenance and updated orders accordingly - Yes  Labs reviewed in EPIC    Mammogram Screening: Patient over age 50, mutual decision to screen reflected in health maintenance.    Pertinent mammograms are  "reviewed under the imaging tab.  History of abnormal Pap smear: NO - age 30-65 PAP every 5 years with negative HPV co-testing recommended  PAP / HPV Latest Ref Rng & Units 8/25/2017 4/24/2014 8/5/2010   PAP - NIL NIL NIL   HPV 16 DNA NEG:Negative Negative - -   HPV 18 DNA NEG:Negative Negative - -   OTHER HR HPV NEG:Negative Negative - -     Reviewed and updated as needed this visit by clinical staff  Tobacco  Allergies  Meds  Problems  Med Hx  Surg Hx  Fam Hx  Soc Hx          Reviewed and updated as needed this visit by Provider  Tobacco  Allergies  Meds  Problems  Med Hx  Surg Hx  Fam Hx            Review of Systems   Constitutional: Negative for chills and fever.   HENT: Negative for congestion and ear pain.    Eyes: Negative for pain.   Respiratory: Negative for cough.    Cardiovascular: Negative for chest pain.   Gastrointestinal: Negative for abdominal pain, constipation, diarrhea and hematochezia.   Genitourinary: Negative for frequency and hematuria.   Neurological: Negative for dizziness.   Psychiatric/Behavioral: The patient is nervous/anxious.    having a lot of stress       OBJECTIVE:   /80 (BP Location: Right arm, Patient Position: Chair, Cuff Size: Adult Regular)   Pulse 79   Temp 96.8  F (36  C) (Tympanic)   Ht 1.638 m (5' 4.5\")   Wt 54.8 kg (120 lb 14.4 oz)   LMP 02/08/2013   SpO2 99%   BMI 20.43 kg/m    Physical Exam  GENERAL: healthy, alert and no distress  EYES: Eyes grossly normal to inspection, PERRL and conjunctivae and sclerae normal  HENT: ear canals and TM's normal, nose and mouth without ulcers or lesions  NECK: no adenopathy, no asymmetry, masses, or scars and thyroid normal to palpation  RESP: lungs clear to auscultation - no rales, rhonchi or wheezes  CV: regular rate and rhythm, normal S1 S2, no S3 or S4, no murmur, click or rub, no peripheral edema and peripheral pulses strong  ABDOMEN: soft, nontender, no hepatosplenomegaly, no masses and bowel sounds " "normal  MS: no gross musculoskeletal defects noted, no edema  SKIN: no suspicious lesions or rashes  NEURO: Normal strength and tone, mentation intact and speech normal  PSYCH: mentation appears normal, affect normal/bright    Diagnostic Test Results:  Labs reviewed in Epic    ASSESSMENT/PLAN:   1. Routine general medical examination at a health care facility  Routine health education discussed: calcium, diet, exercise, weight, safety.     2. Atrophic vaginitis  Medication use discussed    3. Benign essential hypertension  Controlling with diet and exericse.  discussed      The 10-year ASCVD risk score (Valorie MAC Jr., et al., 2013) is: 2%    Values used to calculate the score:      Age: 59 years      Sex: Female      Is Non- : No      Diabetic: No      Tobacco smoker: No      Systolic Blood Pressure: 132 mmHg      Is BP treated: No      HDL Cholesterol: 64 mg/dL      Total Cholesterol: 175 mg/dL     COUNSELING:  Reviewed preventive health counseling, as reflected in patient instructions    Estimated body mass index is 20.43 kg/m  as calculated from the following:    Height as of this encounter: 1.638 m (5' 4.5\").    Weight as of this encounter: 54.8 kg (120 lb 14.4 oz).         reports that she has never smoked. She has never used smokeless tobacco.      Counseling Resources:  ATP IV Guidelines  Pooled Cohorts Equation Calculator  Breast Cancer Risk Calculator  FRAX Risk Assessment  ICSI Preventive Guidelines  Dietary Guidelines for Americans, 2010  USDA's MyPlate  ASA Prophylaxis  Lung CA Screening    Tabitha Quintero MD  [facility]  "

## 2019-12-06 ENCOUNTER — OFFICE VISIT (OUTPATIENT)
Dept: PODIATRY | Facility: CLINIC | Age: 59
End: 2019-12-06
Payer: COMMERCIAL

## 2019-12-06 VITALS
WEIGHT: 117 LBS | HEIGHT: 64 IN | DIASTOLIC BLOOD PRESSURE: 92 MMHG | BODY MASS INDEX: 19.97 KG/M2 | HEART RATE: 84 BPM | SYSTOLIC BLOOD PRESSURE: 154 MMHG

## 2019-12-06 DIAGNOSIS — M79.89 SOFT TISSUE MASS: Primary | ICD-10-CM

## 2019-12-06 PROCEDURE — 99203 OFFICE O/P NEW LOW 30 MIN: CPT | Performed by: PODIATRIST

## 2019-12-06 ASSESSMENT — MIFFLIN-ST. JEOR: SCORE: 1100.71

## 2019-12-06 NOTE — PROGRESS NOTES
PATIENT HISTORY:  Mercedes Lema is a 59 year old female who presents to clinic for a right foot lump. Plantar aspect of fore foot. Mild pain with pressure. One week duration. No injuries reported.  Patient reports an incident with her second toe a while ago when exercising. She noticed bruising under this toe that resolved. No toe pain currently.  No treatments.    Review of Systems:  Patient denies fever, chills, rash, wound, stiffness, limping, numbness, weakness, heart burn, blood in stool, chest pain with activity, calf pain when walking, shortness of breath with activity, chronic cough, easy bleeding/bruising, swelling of ankles, excessive thirst, fatigue, depression, anxiety.      PAST MEDICAL HISTORY:   Past Medical History:   Diagnosis Date     Allergic rhinitis, cause unspecified      Anxiety state, unspecified     fear of flying (Ativan)     Breast cancer (H) 8/7/2009    Right Breast Cancer     Hypertension      Hyponatremia 1/2/2018        PAST SURGICAL HISTORY:   Past Surgical History:   Procedure Laterality Date     BIOPSY  8/7/2009    Right Breast Needle Biopsy     BIOPSY  8/20/2009    MRI Guided Right Breast Biopsy     BIOPSY  8/21/2009    Left Breast US Guided Biopsy     BREAST SURGERY  9/8/2009    Right/Left Lumpectomies     LAPAROSCOPIC HYSTERECTOMY SUPRACERVICAL, BILATERAL SALPINGO-OOPHORECTOMY, COMBINED  2/20/2013    Procedure: COMBINED LAPAROSCOPIC HYSTERECTOMY SUPRACERVICAL, SALPINGO-OOPHORECTOMY;  LAPAROSCOPIC HYSTERECTOMY SUPRACERVICAL, SALPINGO-OOPHORECTOMY (MORCELLATOR, TRIP LOOP)   ;  Surgeon: Suzy Soni MD;  Location: [facility]        MEDICATIONS:   Current Outpatient Medications:      Acetaminophen (TYLENOL PO), Take 325-650 mg by mouth every 6 hours as needed for mild pain or fever, Disp: , Rfl:      CALCIUM 600 + D OR, daily, Disp: , Rfl:      cyclobenzaprine (FLEXERIL) 5 MG tablet, 1-2 po tid prn, Disp: 30 tablet, Rfl: 1     estradiol (VAGIFEM) 10 MCG TABS vaginal  tablet, Place 1 tablet (10 mcg) vaginally three times a week, Disp: 36 tablet, Rfl: 3     FEXOFENADINE HCL PO, Take 180 mg by mouth daily as needed for allergies, Disp: , Rfl:      LORazepam (ATIVAN) 0.5 MG tablet, Take 1 tablet (0.5 mg) by mouth every 6 hours as needed for anxiety, Disp: 20 tablet, Rfl: 0     MULTI-VITAMIN OR TABS, 1 TABLET DAILY, Disp: , Rfl:      ALLERGIES:    Allergies   Allergen Reactions     Augmentin GI Disturbance     Hctz [Hydrochlorothiazide]      Tequin GI Disturbance        SOCIAL HISTORY:   Social History     Socioeconomic History     Marital status: Single     Spouse name: Not on file     Number of children: Not on file     Years of education: Not on file     Highest education level: Master's degree (e.g., MA, MS, Cipriano, MEd, MSW, FREIDA)   Occupational History     Not on file   Social Needs     Financial resource strain: Not hard at all     Food insecurity:     Worry: Never true     Inability: Never true     Transportation needs:     Medical: No     Non-medical: No   Tobacco Use     Smoking status: Never Smoker     Smokeless tobacco: Never Used   Substance and Sexual Activity     Alcohol use: Yes     Frequency: 2-3 times a week     Drinks per session: 1 or 2     Binge frequency: Never     Comment: Glass of red wine 4 days a week     Drug use: No     Sexual activity: Yes     Comment: At times - practices safe sex   Lifestyle     Physical activity:     Days per week: 3 days     Minutes per session: 30 min     Stress: Very much   Relationships     Social connections:     Talks on phone: More than three times a week     Gets together: Once a week     Attends Sikh service: Never     Active member of club or organization: No     Attends meetings of clubs or organizations: Never     Relationship status:      Intimate partner violence:     Fear of current or ex partner: Not on file     Emotionally abused: Not on file     Physically abused: Not on file     Forced sexual activity: Not  "on file   Other Topics Concern      Service Not Asked     Blood Transfusions No     Caffeine Concern Not Asked     Occupational Exposure Not Asked     Hobby Hazards Not Asked     Sleep Concern Not Asked     Stress Concern Not Asked     Weight Concern Not Asked     Special Diet Not Asked     Back Care Not Asked     Exercise Yes     Bike Helmet No     Seat Belt Yes     Self-Exams No     Parent/sibling w/ CABG, MI or angioplasty before 65F 55M? Not Asked   Social History Narrative     Not on file        FAMILY HISTORY:   Family History   Problem Relation Age of Onset     Cancer Mother         Cervical     Hypertension Mother      Family History Negative Father      Breast Cancer Other         cousin     Cerebrovascular Disease Maternal Grandmother 68        EXAM:Vitals: BP (!) 154/92   Pulse 84   Ht 1.626 m (5' 4\")   Wt 53.1 kg (117 lb)   LMP 02/08/2013   BMI 20.08 kg/m    BMI= Body mass index is 20.08 kg/m .    General appearance: Patient is alert and fully cooperative with history & exam.  No sign of distress is noted during the visit.     Psychiatric: Affect is pleasant & appropriate.  Patient appears motivated to improve health.     Respiratory: Breathing is regular & unlabored while sitting.     HEENT: Hearing is intact to spoken word.  Speech is clear.  No gross evidence of visual impairment that would impact ambulation.     Dermatologic: Skin is intact to R foot.  No paronychia or evidence of soft tissue infection is noted.     Vascular: DP & PT pulses are intact & regular on the R.  No significant edema or varicosities noted.  CFT and skin temperature are normal.     Neurologic: Lower extremity sensation is intact to light touch.  No evidence of weakness or contracture in the lower extremities.  No evidence of neuropathy.     Musculoskeletal: Small less than 1 cm palpable lump to plantar aspect of right forefoot near third MPJ area.  Minimal pain.  Slight purple discoloration.   Patient is " ambulatory without assistive device or brace.  No gross ankle deformity noted.  No foot or ankle joint effusion is noted.     ASSESSMENT: R foot mass     PLAN:  Reviewed patient's chart in epic.  Discussed condition and treatment options including pros and cons.     Presents as a small soft tissue mass.  Very likely benign, but cannot r/o malignancy. Advised imaging such as ultrasound or MR (preferred). Patient declines for now, but she agreed to have US ordered in case she changes her mind. Pt will monitor for now. This may be a small hematoma. ARGENIS.  Advise follow 2 to 3 weeks if not improving.     Discuss possible surgical removal if not improving.    Luc Andrade DPM, FACFAS    Mercedes to follow up with Primary Care provider regarding elevated blood pressure.

## 2019-12-06 NOTE — LETTER
12/6/2019         RE: Mercedes Lema  [address] [place] [address]        Dear Colleague,    Thank you for referring your patient, Mercedes Lema, to the [facility]. Please see a copy of my visit note below.    PATIENT HISTORY:  Mercedes Lema is a 59 year old female who presents to clinic for a right foot lump. Plantar aspect of fore foot. Mild pain with pressure. One week duration. No injuries reported.  Patient reports an incident with her second toe a while ago when exercising. She noticed bruising under this toe that resolved. No toe pain currently.  No treatments.    Review of Systems:  Patient denies fever, chills, rash, wound, stiffness, limping, numbness, weakness, heart burn, blood in stool, chest pain with activity, calf pain when walking, shortness of breath with activity, chronic cough, easy bleeding/bruising, swelling of ankles, excessive thirst, fatigue, depression, anxiety.      PAST MEDICAL HISTORY:   Past Medical History:   Diagnosis Date     Allergic rhinitis, cause unspecified      Anxiety state, unspecified     fear of flying (Ativan)     Breast cancer (H) 8/7/2009    Right Breast Cancer     Hypertension      Hyponatremia 1/2/2018        PAST SURGICAL HISTORY:   Past Surgical History:   Procedure Laterality Date     BIOPSY  8/7/2009    Right Breast Needle Biopsy     BIOPSY  8/20/2009    MRI Guided Right Breast Biopsy     BIOPSY  8/21/2009    Left Breast US Guided Biopsy     BREAST SURGERY  9/8/2009    Right/Left Lumpectomies     LAPAROSCOPIC HYSTERECTOMY SUPRACERVICAL, BILATERAL SALPINGO-OOPHORECTOMY, COMBINED  2/20/2013    Procedure: COMBINED LAPAROSCOPIC HYSTERECTOMY SUPRACERVICAL, SALPINGO-OOPHORECTOMY;  LAPAROSCOPIC HYSTERECTOMY SUPRACERVICAL, SALPINGO-OOPHORECTOMY (MORCELLATOR, TRIP LOOP)   ;  Surgeon: Suzy Soni MD;  Location: [facility]        MEDICATIONS:   Current Outpatient Medications:      Acetaminophen (TYLENOL PO), Take 325-650 mg by  mouth every 6 hours as needed for mild pain or fever, Disp: , Rfl:      CALCIUM 600 + D OR, daily, Disp: , Rfl:      cyclobenzaprine (FLEXERIL) 5 MG tablet, 1-2 po tid prn, Disp: 30 tablet, Rfl: 1     estradiol (VAGIFEM) 10 MCG TABS vaginal tablet, Place 1 tablet (10 mcg) vaginally three times a week, Disp: 36 tablet, Rfl: 3     FEXOFENADINE HCL PO, Take 180 mg by mouth daily as needed for allergies, Disp: , Rfl:      LORazepam (ATIVAN) 0.5 MG tablet, Take 1 tablet (0.5 mg) by mouth every 6 hours as needed for anxiety, Disp: 20 tablet, Rfl: 0     MULTI-VITAMIN OR TABS, 1 TABLET DAILY, Disp: , Rfl:      ALLERGIES:    Allergies   Allergen Reactions     Augmentin GI Disturbance     Hctz [Hydrochlorothiazide]      Tequin GI Disturbance        SOCIAL HISTORY:   Social History     Socioeconomic History     Marital status: Single     Spouse name: Not on file     Number of children: Not on file     Years of education: Not on file     Highest education level: Master's degree (e.g., MA, MS, Cipriano, MEd, MSW, FREIDA)   Occupational History     Not on file   Social Needs     Financial resource strain: Not hard at all     Food insecurity:     Worry: Never true     Inability: Never true     Transportation needs:     Medical: No     Non-medical: No   Tobacco Use     Smoking status: Never Smoker     Smokeless tobacco: Never Used   Substance and Sexual Activity     Alcohol use: Yes     Frequency: 2-3 times a week     Drinks per session: 1 or 2     Binge frequency: Never     Comment: Glass of red wine 4 days a week     Drug use: No     Sexual activity: Yes     Comment: At times - practices safe sex   Lifestyle     Physical activity:     Days per week: 3 days     Minutes per session: 30 min     Stress: Very much   Relationships     Social connections:     Talks on phone: More than three times a week     Gets together: Once a week     Attends Jehovah's witness service: Never     Active member of club or organization: No     Attends meetings of  "clubs or organizations: Never     Relationship status:      Intimate partner violence:     Fear of current or ex partner: Not on file     Emotionally abused: Not on file     Physically abused: Not on file     Forced sexual activity: Not on file   Other Topics Concern      Service Not Asked     Blood Transfusions No     Caffeine Concern Not Asked     Occupational Exposure Not Asked     Hobby Hazards Not Asked     Sleep Concern Not Asked     Stress Concern Not Asked     Weight Concern Not Asked     Special Diet Not Asked     Back Care Not Asked     Exercise Yes     Bike Helmet No     Seat Belt Yes     Self-Exams No     Parent/sibling w/ CABG, MI or angioplasty before 65F 55M? Not Asked   Social History Narrative     Not on file        FAMILY HISTORY:   Family History   Problem Relation Age of Onset     Cancer Mother         Cervical     Hypertension Mother      Family History Negative Father      Breast Cancer Other         cousin     Cerebrovascular Disease Maternal Grandmother 68        EXAM:Vitals: BP (!) 154/92   Pulse 84   Ht 1.626 m (5' 4\")   Wt 53.1 kg (117 lb)   LMP 02/08/2013   BMI 20.08 kg/m     BMI= Body mass index is 20.08 kg/m .    General appearance: Patient is alert and fully cooperative with history & exam.  No sign of distress is noted during the visit.     Psychiatric: Affect is pleasant & appropriate.  Patient appears motivated to improve health.     Respiratory: Breathing is regular & unlabored while sitting.     HEENT: Hearing is intact to spoken word.  Speech is clear.  No gross evidence of visual impairment that would impact ambulation.     Dermatologic: Skin is intact to R foot.  No paronychia or evidence of soft tissue infection is noted.     Vascular: DP & PT pulses are intact & regular on the R.  No significant edema or varicosities noted.  CFT and skin temperature are normal.     Neurologic: Lower extremity sensation is intact to light touch.  No evidence of weakness or " contracture in the lower extremities.  No evidence of neuropathy.     Musculoskeletal: Small less than 1 cm palpable lump to plantar aspect of right forefoot near third MPJ area.  Minimal pain.  Slight purple discoloration.   Patient is ambulatory without assistive device or brace.  No gross ankle deformity noted.  No foot or ankle joint effusion is noted.     ASSESSMENT: R foot mass     PLAN:  Reviewed patient's chart in epic.  Discussed condition and treatment options including pros and cons.     Presents as a small soft tissue mass.  Very likely benign, but cannot r/o malignancy. Advised ultrasound for imaging. Patient declines for now. Pt will monitor for now. This may be a small hematoma. ARGENIS.  Advise follow 2 to 3 weeks if not improving.     Discuss possible surgical removal if not improving.    Luc Andrade DPM, KENDRICK Long to follow up with Primary Care provider regarding elevated blood pressure.          Again, thank you for allowing me to participate in the care of your patient.        Sincerely,        Luc Andrade DPM

## 2019-12-06 NOTE — PATIENT INSTRUCTIONS
Thank you for choosing [facility]!    Follow up as needed    DR. HEIN'S CLINIC LOCATIONS     MONDAY  [place] TUESDAY & FRIDAY AM  [place]   [address]   [address]   [place] [address] [place] [id number]   [phone]  [phone]  [phone]       WEDNESDAY  [place] SCHEDULE SURGERY: [phone]   [address] [facility] APPOINTMENTS: [phone]   [place] [address] BILLING QUESTIONS: [phone]   [phone]       PRICE THERAPY  Many aches and pains throughout the foot and ankle can be helped with many simple treatments. This is usually described as PRICE Therapy.      P - Protection - often times, inflammation/pain in the lower extremity is not able to improve simply because the areas involved are never allowed to rest. Every step we take can bother the problematic area. Protecting those areas is an important step in the healing process. This may involve a walking cast boot, a special insert/orthotic device, an ankle brace, or simply avoiding barefoot walking.    R - Rest - in addition to protecting the foot/ankle, resting is an important, but often times difficult, treatment option. Getting off your feet when they bother you, and specifically avoiding activities that cause pain/discomfort, are very beneficial to prevent, and treat, foot/ankle pain.      I - Ice - icing regularly can help to decrease inflammation and swelling in the foot, thus decreasing pain. Using an ice pack or a bag of frozen veggies works very well. Ice for 20 minutes multiple times per day as needed.  Do not place the ice directly on the skin as this can cause tissue damage.    C - Compression - using a compression wrap or an ACE wrap can help to decrease swelling, which can help to decrease pain. Wearing the wraps is generally not needed at night, but they should be worn on a regular basis when you are going to be on your  feet for prolonged periods as gravity tends to pull fluids down to your feet/ankles.    E - Elevation - elevating your lower extremities multiple times daily for 15-20 minutes can help to decrease swelling, which works well in decreasing pain levels.    NSAID/Tylenol - Anti-inflammatories like Aleve or ibuprofen, and/or a pain medication, such as Tylenol, can help to improve pain levels and get the issue resolved sooner rather than later. Anyone with liver issues should be careful with Tylenol, and anyone with high blood pressure or heart, stomach or kidney issues should be careful with anti-inflammatories. Please ask if you have questions about these medications, including dosage.    [facility] SCHEDULING  They should be calling you within 24 hours to schedule your scan.  If not, please call the location discussed at your appointment.    1) [facility]:       [phone]      [address] [place] [address]    2) [facility]:      [phone] [address] [place] [address]    3) [facility]:       [phone]      [address] [place] [address]    * We will call you with the results. Please allow 24-48 hours for the results to be read and final.          Mercedes to follow up with Primary Care provider regarding elevated blood pressure.      BODY WEIGHT AND YOUR FEET  The following information is included in the after visit summary for all patients. Body weight can be a sensitive issue to discuss in clinic, but we think the following information is very important. Although we focus on the feet and ankles, we do support the overall health of our patients.     Many things can cause foot and ankle problems. Foot structure, activity level, foot mechanics and injuries are common causes of pain. One very important issue that often goes unmentioned, is body weight. Extra weight can cause increased stress on muscles,  ligaments, bones and tendons. Sometimes just a few extra pounds is all it takes to put one over her/his threshold. Without reducing that stress, it can be difficult to alleviate pain. As Foot & Ankle specialists, our job is addressing the lower extremity problem and possible causes. Regarding extra body weight, we encourage patients to discuss diet and weight management plans with their primary care doctors. It is this team approach that gives you the best opportunity for pain relief and getting you back on your feet.      [place] has a Comprehensive Weight Management Program. This program includes counseling, education, non-surgical and surgical approaches to weight loss. If you are interested in learning more either talk to you primary care provider or call [phone].

## 2020-01-04 ENCOUNTER — ALLIED HEALTH/NURSE VISIT (OUTPATIENT)
Dept: PEDIATRICS | Facility: CLINIC | Age: 60
End: 2020-01-04
Payer: COMMERCIAL

## 2020-01-04 VITALS — SYSTOLIC BLOOD PRESSURE: 138 MMHG | DIASTOLIC BLOOD PRESSURE: 80 MMHG

## 2020-01-04 DIAGNOSIS — Z01.30 BP CHECK: Primary | ICD-10-CM

## 2020-01-04 PROCEDURE — 99207 ZZC NO CHARGE NURSE ONLY: CPT | Performed by: INTERNAL MEDICINE

## 2020-01-04 NOTE — PROGRESS NOTES
Mercedes Lema was evaluated at [facility] on January 4, 2020 at which time her blood pressure was:    BP Readings from Last 3 Encounters:   01/04/20 138/80   12/06/19 (!) 154/92   10/29/19 132/80     Pulse Readings from Last 3 Encounters:   12/06/19 84   10/29/19 79   09/14/18 77       Reviewed lifestyle modifications for blood pressure control and reduction: including making healthy food choices, managing weight, getting regular exercise, smoking cessation, reducing alcohol consumption, monitoring blood pressure regularly.     Symptoms: None    BP Goal:< 140/90 mmHg    BP Assessment:  BP at goal    Potential Reasons for BP too high: NA - Not applicable    BP Follow-Up Plan: Recheck BP in 6 months at pharmacy    Recommendation to Provider: n/a    Note completed by:Buzz Ocampo, PharmD  [facility]

## 2020-01-21 ENCOUNTER — TELEPHONE (OUTPATIENT)
Dept: PEDIATRICS | Facility: CLINIC | Age: 60
End: 2020-01-21

## 2020-01-22 NOTE — TELEPHONE ENCOUNTER
1st attempt l/m to relay Dr. Quintero's message below(If the pt is still having the symptoms she should be seen in clinic either with same day or use the next PIPPA the pcp has).  Zully Desir on 1/22/2020 at 11:05 AM

## 2020-01-22 NOTE — TELEPHONE ENCOUNTER
Acute symptoms from high blood pressure are uncommon especially at this level.  Wonder if something else is going on causing the symptoms, like viral illness.  I am not convinced medication is needed given her last bp in pharmacy.  If symptoms continue then she should be seen in clinic to assess.  Can use a PIPPA or huddle or schedule with same kamilla

## 2020-01-27 ENCOUNTER — OFFICE VISIT (OUTPATIENT)
Dept: PEDIATRICS | Facility: CLINIC | Age: 60
End: 2020-01-27
Payer: COMMERCIAL

## 2020-01-27 VITALS
HEART RATE: 84 BPM | WEIGHT: 120 LBS | DIASTOLIC BLOOD PRESSURE: 82 MMHG | OXYGEN SATURATION: 99 % | TEMPERATURE: 98.1 F | SYSTOLIC BLOOD PRESSURE: 130 MMHG | BODY MASS INDEX: 20.6 KG/M2

## 2020-01-27 DIAGNOSIS — R03.0 ELEVATED BLOOD PRESSURE READING WITHOUT DIAGNOSIS OF HYPERTENSION: ICD-10-CM

## 2020-01-27 DIAGNOSIS — R51.9 ACUTE NONINTRACTABLE HEADACHE, UNSPECIFIED HEADACHE TYPE: Primary | ICD-10-CM

## 2020-01-27 LAB
BASOPHILS # BLD AUTO: 0 10E9/L (ref 0–0.2)
BASOPHILS NFR BLD AUTO: 0.3 %
DIFFERENTIAL METHOD BLD: ABNORMAL
EOSINOPHIL # BLD AUTO: 0.2 10E9/L (ref 0–0.7)
EOSINOPHIL NFR BLD AUTO: 2.5 %
ERYTHROCYTE [DISTWIDTH] IN BLOOD BY AUTOMATED COUNT: 14.5 % (ref 10–15)
HCT VFR BLD AUTO: 38.1 % (ref 35–47)
HGB BLD-MCNC: 12.6 G/DL (ref 11.7–15.7)
LYMPHOCYTES # BLD AUTO: 1.3 10E9/L (ref 0.8–5.3)
LYMPHOCYTES NFR BLD AUTO: 19.6 %
MCH RBC QN AUTO: 26.3 PG (ref 26.5–33)
MCHC RBC AUTO-ENTMCNC: 33.1 G/DL (ref 31.5–36.5)
MCV RBC AUTO: 80 FL (ref 78–100)
MONOCYTES # BLD AUTO: 0.4 10E9/L (ref 0–1.3)
MONOCYTES NFR BLD AUTO: 6.4 %
NEUTROPHILS # BLD AUTO: 4.6 10E9/L (ref 1.6–8.3)
NEUTROPHILS NFR BLD AUTO: 71.2 %
PLATELET # BLD AUTO: 299 10E9/L (ref 150–450)
RBC # BLD AUTO: 4.79 10E12/L (ref 3.8–5.2)
WBC # BLD AUTO: 6.4 10E9/L (ref 4–11)

## 2020-01-27 PROCEDURE — 80050 GENERAL HEALTH PANEL: CPT | Performed by: PHYSICIAN ASSISTANT

## 2020-01-27 PROCEDURE — 99214 OFFICE O/P EST MOD 30 MIN: CPT | Performed by: PHYSICIAN ASSISTANT

## 2020-01-27 PROCEDURE — 36415 COLL VENOUS BLD VENIPUNCTURE: CPT | Performed by: PHYSICIAN ASSISTANT

## 2020-01-27 NOTE — PROGRESS NOTES
Subjective     Mercedes Lema is a 59 year old female who presents to clinic today for the following health issues:    HPI   Hypertension Follow-up      Do you check your blood pressure regularly outside of the clinic? Yes     Are you following a low salt diet? No    Are your blood pressures ever more than 140 on the top number (systolic) OR more   than 90 on the bottom number (diastolic), for example 140/90? Yes      How many servings of fruits and vegetables do you eat daily?  0-1    On average, how many sweetened beverages do you drink each day (Examples: soda, juice, sweet tea, etc.  Do NOT count diet or artificially sweetened beverages)?   0    How many days per week do you exercise enough to make your heart beat faster? 5    How many minutes a day do you exercise enough to make your heart beat faster? 30 - 60    How many days per week do you miss taking your medication? 0    Been feeling tired lately, head will pound and her bp at home was up to 166/102. Started a new stressful job 2 months ago but this has been going on prior to that.     Review of Systems   ROS COMP: Constitutional, HEENT, cardiovascular, pulmonary, gi and gu systems are negative, except as otherwise noted.      Objective    /82 (BP Location: Right arm, Patient Position: Sitting)   Pulse 84   Temp 98.1  F (36.7  C) (Tympanic)   Wt 54.4 kg (120 lb)   LMP 02/08/2013   SpO2 99%   BMI 20.60 kg/m    Body mass index is 20.6 kg/m .  Physical Exam   GENERAL: alert and no distress  EYES: Eyes grossly normal to inspection, PERRL and conjunctivae and sclerae normal  HENT: ear canals and TM's normal, nose and mouth without ulcers or lesions  NECK: no adenopathy  RESP: lungs clear to auscultation - no rales, rhonchi or wheezes  CV: regular rate and rhythm, normal S1 S2, no S3 or S4  Neuro:  Normal strength and tone, mentation intact and speech normal    Diagnostic Test Results:  No results found for this or any previous visit (from the  past 24 hour(s)).        Assessment & Plan   (R51) Acute nonintractable headache, unspecified headache type  (primary encounter diagnosis)  Comment: labs obtained.   Plan: Comprehensive metabolic panel, CBC with         platelets differential, TSH with free T4 reflex            (R03.0) Elevated blood pressure reading without diagnosis of hypertension  Comment: continue to check and keep a record. Call if symptoms are consistently elevated.   Plan: Comprehensive metabolic panel, CBC with         platelets differential, TSH with free T4 reflex            Quinn Honeycutt PA-C  [facility]

## 2020-01-28 ENCOUNTER — TELEPHONE (OUTPATIENT)
Dept: PEDIATRICS | Facility: CLINIC | Age: 60
End: 2020-01-28

## 2020-01-28 LAB
ALBUMIN SERPL-MCNC: 4.2 G/DL (ref 3.4–5)
ALP SERPL-CCNC: 60 U/L (ref 40–150)
ALT SERPL W P-5'-P-CCNC: 32 U/L (ref 0–50)
ANION GAP SERPL CALCULATED.3IONS-SCNC: 5 MMOL/L (ref 3–14)
AST SERPL W P-5'-P-CCNC: 23 U/L (ref 0–45)
BILIRUB SERPL-MCNC: 0.3 MG/DL (ref 0.2–1.3)
BUN SERPL-MCNC: 19 MG/DL (ref 7–30)
CALCIUM SERPL-MCNC: 9.2 MG/DL (ref 8.5–10.1)
CHLORIDE SERPL-SCNC: 104 MMOL/L (ref 94–109)
CO2 SERPL-SCNC: 28 MMOL/L (ref 20–32)
CREAT SERPL-MCNC: 0.81 MG/DL (ref 0.52–1.04)
GFR SERPL CREATININE-BSD FRML MDRD: 80 ML/MIN/{1.73_M2}
GLUCOSE SERPL-MCNC: 86 MG/DL (ref 70–99)
POTASSIUM SERPL-SCNC: 4.6 MMOL/L (ref 3.4–5.3)
PROT SERPL-MCNC: 7.5 G/DL (ref 6.8–8.8)
SODIUM SERPL-SCNC: 137 MMOL/L (ref 133–144)
TSH SERPL DL<=0.005 MIU/L-ACNC: 0.53 MU/L (ref 0.4–4)

## 2020-01-28 NOTE — TELEPHONE ENCOUNTER
Patient given message below.     Call patient. Her labs look great.   I want her to continue to check her BP as we discussed and call us if elevated values.     Pt expressed understanding and acceptance of the plan.  Pt had no further questions at this time.  Advised can call back to clinic at any time with concerns.     Tiffany Wright RN Flex

## 2020-07-03 ENCOUNTER — HOSPITAL ENCOUNTER (OUTPATIENT)
Dept: MAMMOGRAPHY | Facility: CLINIC | Age: 60
Discharge: HOME | End: 2020-07-03
Attending: INTERNAL MEDICINE | Admitting: INTERNAL MEDICINE
Payer: COMMERCIAL

## 2020-07-03 DIAGNOSIS — Z12.31 VISIT FOR SCREENING MAMMOGRAM: ICD-10-CM

## 2020-07-03 PROCEDURE — 77067 SCR MAMMO BI INCL CAD: CPT

## 2020-07-08 ENCOUNTER — TELEPHONE (OUTPATIENT)
Dept: PEDIATRICS | Facility: CLINIC | Age: 60
End: 2020-07-08

## 2020-07-08 NOTE — TELEPHONE ENCOUNTER
General Call:   Who is calling:  Patient  Reason for Call:  Want a face to face visit with Dr. Quintero to address vaginal bleeding. My offered to scheduled with another provider was declined.   What are your questions or concerns:  Pt is concerned that she may have uterine cancer.   Date of last appointment with provider: 10/29/2019  Okay to leave a detailed message:Yes at Other phone number:  [phone]*   Pt is requesting a direct dial number to call back to clinic, she doesn't want to be on hold.     Mago Garcia on 7/8/2020 at 6:42 PM

## 2020-07-09 NOTE — TELEPHONE ENCOUNTER
"I called and spoke to the pt and she does only want to see Dr. Quintero in clinic. I advised her of Dr. Quintero's clinic availability and she is scheduled for an upcoming appointment. She declined on scheduling a sooner appointment at this time but \"if the problem persists she will call back to schedule with another md\".  Zully Desir on 7/9/2020 at 8:45 AM   "

## 2020-11-06 ENCOUNTER — OFFICE VISIT (OUTPATIENT)
Dept: PEDIATRICS | Facility: CLINIC | Age: 60
End: 2020-11-06
Payer: COMMERCIAL

## 2020-11-06 VITALS
SYSTOLIC BLOOD PRESSURE: 118 MMHG | WEIGHT: 122.1 LBS | HEART RATE: 60 BPM | BODY MASS INDEX: 20.84 KG/M2 | DIASTOLIC BLOOD PRESSURE: 74 MMHG | OXYGEN SATURATION: 100 % | HEIGHT: 64 IN | TEMPERATURE: 97.2 F | RESPIRATION RATE: 14 BRPM

## 2020-11-06 DIAGNOSIS — E89.40 ASYMPTOMATIC POSTSURGICAL MENOPAUSE: ICD-10-CM

## 2020-11-06 DIAGNOSIS — Z00.00 ROUTINE GENERAL MEDICAL EXAMINATION AT A HEALTH CARE FACILITY: Primary | ICD-10-CM

## 2020-11-06 LAB — DEPRECATED CALCIDIOL+CALCIFEROL SERPL-MC: 38 UG/L (ref 20–75)

## 2020-11-06 PROCEDURE — 36415 COLL VENOUS BLD VENIPUNCTURE: CPT | Performed by: NURSE PRACTITIONER

## 2020-11-06 PROCEDURE — 99396 PREV VISIT EST AGE 40-64: CPT | Performed by: NURSE PRACTITIONER

## 2020-11-06 PROCEDURE — 82306 VITAMIN D 25 HYDROXY: CPT | Performed by: NURSE PRACTITIONER

## 2020-11-06 PROCEDURE — 80048 BASIC METABOLIC PNL TOTAL CA: CPT | Performed by: NURSE PRACTITIONER

## 2020-11-06 PROCEDURE — 80061 LIPID PANEL: CPT | Performed by: NURSE PRACTITIONER

## 2020-11-06 ASSESSMENT — ENCOUNTER SYMPTOMS
CONSTIPATION: 0
CHILLS: 0
MYALGIAS: 0
SHORTNESS OF BREATH: 0
DIZZINESS: 0
NERVOUS/ANXIOUS: 0
ABDOMINAL PAIN: 0
BREAST MASS: 0
DYSURIA: 0
HEARTBURN: 0
SORE THROAT: 0
PALPITATIONS: 0
EYE PAIN: 0
HEADACHES: 0
ARTHRALGIAS: 0
PARESTHESIAS: 0
WEAKNESS: 0
JOINT SWELLING: 0
NAUSEA: 0
FREQUENCY: 0
COUGH: 0
HEMATOCHEZIA: 0
HEMATURIA: 0
FEVER: 0
DIARRHEA: 0

## 2020-11-06 ASSESSMENT — MIFFLIN-ST. JEOR: SCORE: 1118.84

## 2020-11-06 NOTE — PROGRESS NOTES
SUBJECTIVE:   CC: Mercedes Lema is an 60 year old woman who presents for preventive health visit.     Patient has been advised of split billing requirements and indicates understanding: Yes  Healthy Habits:     Getting at least 3 servings of Calcium per day:  Yes    Bi-annual eye exam:  Yes    Dental care twice a year:  Yes    Sleep apnea or symptoms of sleep apnea:  None    Diet:  Regular (no restrictions)    Frequency of exercise:  2-3 days/week    Duration of exercise:  30-45 minutes    Taking medications regularly:  Not Applicable    Medication side effects:  Not applicable    PHQ-2 Total Score: 1    Additional concerns today:  No    Concerns today: wants to know blood type  Mom is type O and she heard type O may have lower risk of complications from Covid  Fasting today - wondering about lab work    She had a DEXA in 2015 which showed normal bone density in lumbar spine, osteopenia (T score -1.3) in the hip  Advised to repeat in 10 years  She would like to repeat sooner  She has a small frame  Mom has osteoporosis  She eats 2-3 servings of calcium daily  Does not take calcium/vit D supplement  She enjoys walking her 2 dogs        Today's PHQ-2 Score:   PHQ-2 ( 1999 Pfizer) 11/6/2020   Q1: Little interest or pleasure in doing things 0   Q2: Feeling down, depressed or hopeless 1   PHQ-2 Score 1   Q1: Little interest or pleasure in doing things Not at all   Q2: Feeling down, depressed or hopeless Several days   PHQ-2 Score 1     Abuse: Current or Past (Physical, Sexual or Emotional) - No  Do you feel safe in your environment? Yes    Social History     Tobacco Use     Smoking status: Never Smoker     Smokeless tobacco: Never Used   Substance Use Topics     Alcohol use: Yes     Frequency: 2-3 times a week     Drinks per session: 1 or 2     Binge frequency: Never     Comment: Glass of red wine 4 days a week       Alcohol Use 11/6/2020   Prescreen: >3 drinks/day or >7 drinks/week? No   Prescreen: >3 drinks/day  or >7 drinks/week? -     Reviewed orders with patient.  Reviewed health maintenance and updated orders accordingly - Yes  Lab work is in process  Labs reviewed in EPIC  BP Readings from Last 3 Encounters:   11/06/20 118/74   01/27/20 130/82   01/04/20 138/80    Wt Readings from Last 3 Encounters:   11/06/20 55.4 kg (122 lb 1.6 oz)   01/27/20 54.4 kg (120 lb)   12/06/19 53.1 kg (117 lb)                  Patient Active Problem List   Diagnosis     Allergic rhinitis     Personal history of malignant neoplasm of breast     CARDIOVASCULAR SCREENING; LDL GOAL LESS THAN 160     Atrophic vaginitis     Benign essential hypertension     Past Surgical History:   Procedure Laterality Date     BIOPSY  8/7/2009    Right Breast Needle Biopsy     BIOPSY  8/20/2009    MRI Guided Right Breast Biopsy     BIOPSY  8/21/2009    Left Breast US Guided Biopsy     BREAST SURGERY  9/8/2009    Right/Left Lumpectomies     LAPAROSCOPIC HYSTERECTOMY SUPRACERVICAL, BILATERAL SALPINGO-OOPHORECTOMY, COMBINED  2/20/2013    Procedure: COMBINED LAPAROSCOPIC HYSTERECTOMY SUPRACERVICAL, SALPINGO-OOPHORECTOMY;  LAPAROSCOPIC HYSTERECTOMY SUPRACERVICAL, SALPINGO-OOPHORECTOMY (MORCELLATOR, TRIP LOOP)   ;  Surgeon: Suzy Soni MD;  Location: [facility]       Social History     Tobacco Use     Smoking status: Never Smoker     Smokeless tobacco: Never Used   Substance Use Topics     Alcohol use: Yes     Frequency: 2-3 times a week     Drinks per session: 1 or 2     Binge frequency: Never     Comment: Glass of red wine 4 days a week     Family History   Problem Relation Age of Onset     Cancer Mother         Cervical     Hypertension Mother      Family History Negative Father      Breast Cancer Other         cousin     Cerebrovascular Disease Maternal Grandmother 68         Current Outpatient Medications   Medication Sig Dispense Refill     Acetaminophen (TYLENOL PO) Take 325-650 mg by mouth every 6 hours as needed for mild pain or fever        CALCIUM 600 + D OR daily       cyclobenzaprine (FLEXERIL) 5 MG tablet 1-2 po tid prn 30 tablet 1     estradiol (VAGIFEM) 10 MCG TABS vaginal tablet KPLACE ONE TABLET VAGINALLY THREE TIMES A WEEK 36 tablet 4     FEXOFENADINE HCL PO Take 180 mg by mouth daily as needed for allergies       LORazepam (ATIVAN) 0.5 MG tablet Take 1 tablet (0.5 mg) by mouth every 6 hours as needed for anxiety 20 tablet 0     MULTI-VITAMIN OR TABS 1 TABLET DAILY         Mammogram Screening: Patient over age 50, mutual decision to screen reflected in health maintenance.    Pertinent mammograms are reviewed under the imaging tab.  History of abnormal Pap smear: NO - age 30-65 PAP every 5 years with negative HPV co-testing recommended  PAP / HPV Latest Ref Rng & Units 8/25/2017 4/24/2014 8/5/2010   PAP - NIL NIL NIL   HPV 16 DNA NEG:Negative Negative - -   HPV 18 DNA NEG:Negative Negative - -   OTHER HR HPV NEG:Negative Negative - -     Reviewed and updated as needed this visit by clinical staff  Tobacco  Allergies    Med Hx  Surg Hx  Fam Hx  Soc Hx        Reviewed and updated as needed this visit by Provider                Past Medical History:   Diagnosis Date     Allergic rhinitis, cause unspecified      Anxiety state, unspecified     fear of flying (Ativan)     Breast cancer (H) 8/7/2009    Right Breast Cancer     Hypertension      Hyponatremia 1/2/2018        Review of Systems   Constitutional: Negative for chills and fever.   HENT: Positive for congestion. Negative for ear pain, hearing loss and sore throat.    Eyes: Negative for pain and visual disturbance.   Respiratory: Negative for cough and shortness of breath.    Cardiovascular: Negative for chest pain, palpitations and peripheral edema.   Gastrointestinal: Negative for abdominal pain, constipation, diarrhea, heartburn, hematochezia and nausea.   Breasts:  Negative for tenderness, breast mass and discharge.   Genitourinary: Negative for dysuria, frequency, genital sores,  "hematuria, pelvic pain, urgency, vaginal bleeding and vaginal discharge.   Musculoskeletal: Negative for arthralgias, joint swelling and myalgias.   Skin: Negative for rash.   Neurological: Negative for dizziness, weakness, headaches and paresthesias.   Psychiatric/Behavioral: Negative for mood changes. The patient is not nervous/anxious.           OBJECTIVE:   /74 (BP Location: Right arm, Cuff Size: Adult Regular)   Pulse 60   Temp 97.2  F (36.2  C) (Tympanic)   Resp 14   Ht 1.626 m (5' 4\")   Wt 55.4 kg (122 lb 1.6 oz)   LMP 02/08/2013   SpO2 100%   BMI 20.96 kg/m    Physical Exam  GENERAL: healthy, alert and no distress  EYES: Eyes grossly normal to inspection, PERRL and conjunctivae and sclerae normal  HENT: ear canals and TM's normal, nose and mouth without ulcers or lesions  NECK: no adenopathy, no asymmetry, masses, or scars and thyroid normal to palpation  RESP: lungs clear to auscultation - no rales, rhonchi or wheezes  CV: regular rate and rhythm, normal S1 S2, no S3 or S4, no murmur, click or rub, no peripheral edema and peripheral pulses strong  ABDOMEN: soft, nontender, no hepatosplenomegaly, no masses and bowel sounds normal  MS: no gross musculoskeletal defects noted, no edema  SKIN: no suspicious lesions or rashes  NEURO: Normal strength and tone, mentation intact and speech normal  PSYCH: mentation appears normal, affect normal/bright    Diagnostic Test Results:  Labs reviewed in Epic    ASSESSMENT/PLAN:   1. Routine general medical examination at a health care facility  - Basic metabolic panel  (Ca, Cl, CO2, Creat, Gluc, K, Na, BUN)  - Lipid Profile (Chol, Trig, HDL, LDL calc)    2. Asymptomatic postsurgical menopause  - Reviewed FRAX score. Can check with insurance on coverage. If vit D low, should be taking supplementation. Discussed importance of weight bearing activities for bone health  - DX Hip/Pelvis/Spine; Future  - Vitamin D Deficiency    Patient has been advised of split " "billing requirements and indicates understanding: Yes  COUNSELING:  Reviewed preventive health counseling, as reflected in patient instructions       Regular exercise       Healthy diet/nutrition       Osteoporosis prevention/bone health    Estimated body mass index is 20.96 kg/m  as calculated from the following:    Height as of this encounter: 1.626 m (5' 4\").    Weight as of this encounter: 55.4 kg (122 lb 1.6 oz).        She reports that she has never smoked. She has never used smokeless tobacco.      Counseling Resources:  ATP IV Guidelines  Pooled Cohorts Equation Calculator  Breast Cancer Risk Calculator  BRCA-Related Cancer Risk Assessment: FHS-7 Tool  FRAX Risk Assessment  ICSI Preventive Guidelines  Dietary Guidelines for Americans, 2010  USDA's MyPlate  ASA Prophylaxis  Lung CA Screening    SANJUANA Goncalves [facility] [place]  "

## 2020-11-06 NOTE — LETTER
November 9, 2020      Mercedes KINGA Lema  [address] [place] [address]        Dear ,    We are writing to inform you of your test results.    {results letter list:388104}    Resulted Orders   Basic metabolic panel  (Ca, Cl, CO2, Creat, Gluc, K, Na, BUN)   Result Value Ref Range    Sodium 139 133 - 144 mmol/L    Potassium 5.1 3.4 - 5.3 mmol/L    Chloride 108 94 - 109 mmol/L    Carbon Dioxide 31 20 - 32 mmol/L    Anion Gap <1 (L) 3 - 14 mmol/L    Glucose 89 70 - 99 mg/dL      Comment:      Fasting specimen    Urea Nitrogen 14 7 - 30 mg/dL    Creatinine 0.80 0.52 - 1.04 mg/dL    GFR Estimate 81 >60 mL/min/[1.73_m2]      Comment:      Non  GFR Calc  Starting 12/18/2018, serum creatinine based estimated GFR (eGFR) will be   calculated using the Chronic Kidney Disease Epidemiology Collaboration   (CKD-EPI) equation.      GFR Estimate If Black >90 >60 mL/min/[1.73_m2]      Comment:       GFR Calc  Starting 12/18/2018, serum creatinine based estimated GFR (eGFR) will be   calculated using the Chronic Kidney Disease Epidemiology Collaboration   (CKD-EPI) equation.      Calcium 9.1 8.5 - 10.1 mg/dL   Lipid Profile (Chol, Trig, HDL, LDL calc)   Result Value Ref Range    Cholesterol 185 <200 mg/dL    Triglycerides 88 <150 mg/dL      Comment:      Fasting specimen    HDL Cholesterol 57 >49 mg/dL    LDL Cholesterol Calculated 110 (H) <100 mg/dL      Comment:      Above desirable:  100-129 mg/dl  Borderline High:  130-159 mg/dL  High:             160-189 mg/dL  Very high:       >189 mg/dl      Non HDL Cholesterol 128 <130 mg/dL   Vitamin D Deficiency   Result Value Ref Range    Vitamin D Deficiency screening 38 20 - 75 ug/L      Comment:      Season, race, dietary intake, and treatment affect the concentration of   25-hydroxy-Vitamin D. Values may decrease during winter months and increase   during summer months. Values 20-29 ug/L may indicate Vitamin D insufficiency   and values  <20 ug/L may indicate Vitamin D deficiency.  Vitamin D determination is routinely performed by an immunoassay specific for   25 hydroxyvitamin D3.  If an individual is on vitamin D2 (ergocalciferol)   supplementation, please specify 25 OH vitamin D2 and D3 level determination by   LCMSMS test [id number].         If you have any questions or concerns, please call the clinic at the number listed above.       Sincerely,        SANJUANA Goncalves CNP

## 2020-11-07 LAB
ANION GAP SERPL CALCULATED.3IONS-SCNC: <1 MMOL/L (ref 3–14)
BUN SERPL-MCNC: 14 MG/DL (ref 7–30)
CALCIUM SERPL-MCNC: 9.1 MG/DL (ref 8.5–10.1)
CHLORIDE SERPL-SCNC: 108 MMOL/L (ref 94–109)
CHOLEST SERPL-MCNC: 185 MG/DL
CO2 SERPL-SCNC: 31 MMOL/L (ref 20–32)
CREAT SERPL-MCNC: 0.8 MG/DL (ref 0.52–1.04)
GFR SERPL CREATININE-BSD FRML MDRD: 81 ML/MIN/{1.73_M2}
GLUCOSE SERPL-MCNC: 89 MG/DL (ref 70–99)
HDLC SERPL-MCNC: 57 MG/DL
LDLC SERPL CALC-MCNC: 110 MG/DL
NONHDLC SERPL-MCNC: 128 MG/DL
POTASSIUM SERPL-SCNC: 5.1 MMOL/L (ref 3.4–5.3)
SODIUM SERPL-SCNC: 139 MMOL/L (ref 133–144)
TRIGL SERPL-MCNC: 88 MG/DL

## 2020-11-09 NOTE — RESULT ENCOUNTER NOTE
[facility]  [address]  [address]  [place] [address]  Phone: [phone]  Fax: [phone]      11/09/20    Mercedes Lema  [address] [place] [address]      Dear Mercedes,    The results of your recent labs are enclosed.  Your labs are stable. Continue with same medications. Keep up the good work!  Please call the clinic if you have any concerns.    Sincerely,    SANJUANA Goncalves CNP    Your [facility] Care Team

## 2020-11-16 ENCOUNTER — TELEPHONE (OUTPATIENT)
Dept: PEDIATRICS | Facility: CLINIC | Age: 60
End: 2020-11-16

## 2020-11-16 DIAGNOSIS — R79.9 ABNORMAL BLOOD CHEMISTRY: Primary | ICD-10-CM

## 2020-11-16 NOTE — TELEPHONE ENCOUNTER
It is almost always normal lab variation.  Her anion gap does not have a clear trend although was low on last check.  Should do lab-only to recheck and check liver function as that is the second most common cause, orders placed

## 2020-11-16 NOTE — TELEPHONE ENCOUNTER
Test Results    Who is calling?:  The pt.     Who ordered the test:  Janet Carrasco     Type of test: Lab    Date of test:  11/6/20    Where was the test performed:  [facility]     What are your questions/concerns?:  The pt had specific questions about her Anion Gap level and she would like to speak to her care team.

## 2020-11-16 NOTE — TELEPHONE ENCOUNTER
Called and informed patient of Dr. Quintero's message below. Patient will schedule lab only appointment. Irma Le RN on 11/16/2020 at 3:00 PM

## 2020-11-16 NOTE — TELEPHONE ENCOUNTER
Janet and Dr. Quintero    Call placed to pt   She is very concerned about her Anion Gap trending down over the last few years.   She is concerned that there is something wrong because it is below normal    Advised pt that lab results like this a looked at as a whole and that this is just part of the puzzle.    Pt would like an explanation of what the Anion Gap means and why is her's trending down    Thank you  Eddie Sommers RN on 11/16/2020 at 1:30 PM

## 2020-12-01 DIAGNOSIS — R79.9 ABNORMAL BLOOD CHEMISTRY: ICD-10-CM

## 2020-12-01 PROCEDURE — 80053 COMPREHEN METABOLIC PANEL: CPT | Performed by: INTERNAL MEDICINE

## 2020-12-02 ENCOUNTER — HOSPITAL ENCOUNTER (OUTPATIENT)
Dept: BONE DENSITY | Facility: CLINIC | Age: 60
Discharge: HOME | End: 2020-12-02
Attending: NURSE PRACTITIONER | Admitting: NURSE PRACTITIONER
Payer: COMMERCIAL

## 2020-12-02 DIAGNOSIS — E89.40 ASYMPTOMATIC POSTSURGICAL MENOPAUSE: ICD-10-CM

## 2020-12-02 PROBLEM — M85.89 OSTEOPENIA OF MULTIPLE SITES: Status: ACTIVE | Noted: 2020-12-02

## 2020-12-02 LAB
ALBUMIN SERPL-MCNC: 4.1 G/DL (ref 3.4–5)
ALP SERPL-CCNC: 71 U/L (ref 40–150)
ALT SERPL W P-5'-P-CCNC: 24 U/L (ref 0–50)
ANION GAP SERPL CALCULATED.3IONS-SCNC: 7 MMOL/L (ref 3–14)
AST SERPL W P-5'-P-CCNC: 24 U/L (ref 0–45)
BILIRUB SERPL-MCNC: 0.4 MG/DL (ref 0.2–1.3)
BUN SERPL-MCNC: 16 MG/DL (ref 7–30)
CALCIUM SERPL-MCNC: 9.2 MG/DL (ref 8.5–10.1)
CHLORIDE SERPL-SCNC: 101 MMOL/L (ref 94–109)
CO2 SERPL-SCNC: 25 MMOL/L (ref 20–32)
CREAT SERPL-MCNC: 0.9 MG/DL (ref 0.52–1.04)
GFR SERPL CREATININE-BSD FRML MDRD: 70 ML/MIN/{1.73_M2}
GLUCOSE SERPL-MCNC: 77 MG/DL (ref 70–99)
POTASSIUM SERPL-SCNC: 5 MMOL/L (ref 3.4–5.3)
PROT SERPL-MCNC: 7.4 G/DL (ref 6.8–8.8)
SODIUM SERPL-SCNC: 133 MMOL/L (ref 133–144)

## 2020-12-02 PROCEDURE — 77080 DXA BONE DENSITY AXIAL: CPT

## 2020-12-11 ENCOUNTER — TELEPHONE (OUTPATIENT)
Dept: PEDIATRICS | Facility: CLINIC | Age: 60
End: 2020-12-11

## 2020-12-11 NOTE — TELEPHONE ENCOUNTER
Reason for Call:  Request for results:    Name of test or procedure: dexa scan    Date of test of procedure: 12.02.2020    Location of the test or procedure: [facility], [place]    OK to leave the result message on voice mail or with a family member? YES    Phone number Patient can be reached at:  Home number on file [phone] (home)    Additional comments: the patient would also like to have a hard copy of the results mailed to her too pls.  She says you can call if you'd like too, but please mail the report, address on file.    Call taken on 12/11/2020 at 2:25 PM by Margarita Astudillo

## 2020-12-15 NOTE — TELEPHONE ENCOUNTER
Dr. Quintero wrote a result letter on 12/2. Please print and mail to patient if she did not receive it. Can print along with radiology read

## 2020-12-15 NOTE — TELEPHONE ENCOUNTER
The pt is aware that the letter has been placed in the mail.   Zully Desir on 12/15/2020 at 2:26 PM

## 2020-12-15 NOTE — TELEPHONE ENCOUNTER
Called pt. Says she saw Janet and she was the one that ordered the test. Would like me to route to Janet to view and write results and or Tabitha not sure if Tabitha was forwarded the results or not.  Joceline Guthrie MA

## 2021-03-08 DIAGNOSIS — F43.0 ACUTE STRESS REACTION: ICD-10-CM

## 2021-03-08 RX ORDER — LORAZEPAM 0.5 MG/1
0.5 TABLET ORAL EVERY 6 HOURS PRN
Qty: 20 TABLET | Refills: 0 | Status: CANCELLED | OUTPATIENT
Start: 2021-03-08

## 2021-03-08 NOTE — TELEPHONE ENCOUNTER
Did not discuss anxiety at her annual exam. Routing to her primary care provider to see if she has differing opinion, but I would recommend scheduling visit to discuss. Can be virtual visit.

## 2021-03-08 NOTE — TELEPHONE ENCOUNTER
Patient has not had since 2016.     Would you like a visit?    Last OV 11/06/2020      Routing refill request to provider for review/approval because:  Drug not on the [facility] [place] [facility] according to the [facility] refill protocol         Tiffany Wright RN Flex

## 2021-03-08 NOTE — TELEPHONE ENCOUNTER
Reason for Call:  Other   (Patient feels the medication it will help her with ending her job & new job)    Detailed comments:  Patient is asking for refill for    LORazepam (ATIVAN) 0.5 MG tablet    Phone Number Patient can be reached at: Cell number on file:    Telephone Information:   Mobile [phone]       Best Time: Anytime    Can we leave a detailed message on this number? YES    Call taken on 3/8/2021 at 3:31 PM by Ella Woodard

## 2021-03-08 NOTE — TELEPHONE ENCOUNTER
Hasn't had prescription in over 4 yrs.  She either uses very rarely or something is going on.  Please call her.  If having anxiety flare, needs to be seen.  If uses them for specific situation that is rare, I would refill a couple of tabs.

## 2021-03-09 ENCOUNTER — VIRTUAL VISIT (OUTPATIENT)
Dept: PEDIATRICS | Facility: CLINIC | Age: 61
End: 2021-03-09
Payer: COMMERCIAL

## 2021-03-09 DIAGNOSIS — F43.0 ACUTE STRESS REACTION: ICD-10-CM

## 2021-03-09 PROCEDURE — 96127 BRIEF EMOTIONAL/BEHAV ASSMT: CPT | Performed by: INTERNAL MEDICINE

## 2021-03-09 PROCEDURE — 99213 OFFICE O/P EST LOW 20 MIN: CPT | Mod: TEL | Performed by: INTERNAL MEDICINE

## 2021-03-09 RX ORDER — LORAZEPAM 0.5 MG/1
0.5 TABLET ORAL EVERY 6 HOURS PRN
Qty: 10 TABLET | Refills: 0 | Status: SHIPPED | OUTPATIENT
Start: 2021-03-09 | End: 2022-02-08

## 2021-03-09 RX ORDER — PROPRANOLOL HYDROCHLORIDE 20 MG/1
20 TABLET ORAL 3 TIMES DAILY PRN
Qty: 60 TABLET | Refills: 1 | Status: SHIPPED | OUTPATIENT
Start: 2021-03-09 | End: 2022-02-08

## 2021-03-09 ASSESSMENT — ANXIETY QUESTIONNAIRES
3. WORRYING TOO MUCH ABOUT DIFFERENT THINGS: NEARLY EVERY DAY
1. FEELING NERVOUS, ANXIOUS, OR ON EDGE: NEARLY EVERY DAY
7. FEELING AFRAID AS IF SOMETHING AWFUL MIGHT HAPPEN: NEARLY EVERY DAY
2. NOT BEING ABLE TO STOP OR CONTROL WORRYING: NEARLY EVERY DAY
IF YOU CHECKED OFF ANY PROBLEMS ON THIS QUESTIONNAIRE, HOW DIFFICULT HAVE THESE PROBLEMS MADE IT FOR YOU TO DO YOUR WORK, TAKE CARE OF THINGS AT HOME, OR GET ALONG WITH OTHER PEOPLE: VERY DIFFICULT
GAD7 TOTAL SCORE: 16
5. BEING SO RESTLESS THAT IT IS HARD TO SIT STILL: NOT AT ALL
6. BECOMING EASILY ANNOYED OR IRRITABLE: SEVERAL DAYS

## 2021-03-09 ASSESSMENT — PATIENT HEALTH QUESTIONNAIRE - PHQ9: 5. POOR APPETITE OR OVEREATING: NEARLY EVERY DAY

## 2021-03-09 NOTE — TELEPHONE ENCOUNTER
The pt is aware and scheduled for a phone appointment today. She is unable to leave work and needs the medication due to a recent change to her job. If there is anything you need please let me know.   Zully Desir on 3/9/2021 at 9:26 AM

## 2021-03-09 NOTE — PROGRESS NOTES
"Mercedes is a 61 year old who is being evaluated via a billable telephone visit.      What phone number would you like to be contacted at? [phone]  How would you like to obtain your AVS? Mail a copy    Assessment & Plan     Acute stress reaction  Discussed stress and coping.  Med use reviewed.  No lorazepam before driving or at work due to potential to decrease memory and cause mental impairment  - propranolol (INDERAL) 20 MG tablet; Take 1 tablet (20 mg) by mouth 3 times daily as needed (anxiety)  - LORazepam (ATIVAN) 0.5 MG tablet; Take 1 tablet (0.5 mg) by mouth every 6 hours as needed for anxiety             See Patient Instructions    Return in about 2 months (around 5/9/2021), or if symptoms worsen or fail to improve.    Tabitha Quintero MD  [facility]    Shanta Long is a 61 year old who presents for the following health issues     HPI     Anxiety     How are you doing with your anxiety since your last visit? New anxiety.  \"things are coming to a head\".  Was laid off and job ending soon.  Has known for months and hard to cope.  Trying to look for other options    Are you having other symptoms that might be associated with anxiety? Yes:  stress and problems concentration    Have you had a significant life event? Job Concerns     Are you feeling depressed? No    Do you have any concerns with your use of alcohol or other drugs? No    Social History     Tobacco Use     Smoking status: Never Smoker     Smokeless tobacco: Never Used   Substance Use Topics     Alcohol use: Yes     Frequency: 2-3 times a week     Drinks per session: 1 or 2     Binge frequency: Never     Comment: Glass of red wine 4 days a week     Drug use: No     PHUONG-7 SCORE 3/9/2021   Total Score 16       Review of Systems         Objective         Vitals:  No vitals were obtained today due to virtual visit.    Physical Exam   healthy, alert and no distress  PSYCH: Alert and oriented times 3; coherent speech, normal "   rate and volume, able to articulate logical thoughts, able   to abstract reason, no tangential thoughts, no hallucinations   or delusions  Her affect is normal  RESP: No cough, no audible wheezing, able to talk in full sentences  Remainder of exam unable to be completed due to telephone visits                Phone call duration: 15 minutes

## 2021-03-09 NOTE — PATIENT INSTRUCTIONS
Reach out to the employee assistance program for help with anxiety/stress.    Work with HR on other job opportunities.      Do not take lorazepam at work - use propranolol three times daily as needed for anxiety when at work.  You can take propranolol before an event that you feel will be stressful to prevent anxiety symptoms. 15    Stress Management:    Carry a list of options to deal with stress all of the time.    Exercise, especially things that are fun and engaging (games, sports, group activities, etc.)    Engage in hobbies.  Consider something new that you've always wanted to do.    Call friends when stressed.  Have a list of people that you can call.     Puzzles, games or other activities that you can pull out for distration.    Progressive relaxation tapes or CD's ([employer]).    Well rounded diet, avoiding simple carbohydrates ans sticking with proteins like nuts.    Try to keep a consistent sleep-wake cycle.     Talk with family and friends about stress.  Allowing people to care for you is sometimes the greatest gift you can give them.

## 2021-03-10 ASSESSMENT — ANXIETY QUESTIONNAIRES: GAD7 TOTAL SCORE: 16

## 2021-07-05 ENCOUNTER — ANCILLARY PROCEDURE (OUTPATIENT)
Dept: MAMMOGRAPHY | Facility: CLINIC | Age: 61
End: 2021-07-05
Attending: INTERNAL MEDICINE
Payer: COMMERCIAL

## 2021-07-05 DIAGNOSIS — Z12.31 VISIT FOR SCREENING MAMMOGRAM: ICD-10-CM

## 2021-07-05 PROCEDURE — 77067 SCR MAMMO BI INCL CAD: CPT | Mod: TC | Performed by: RADIOLOGY

## 2021-07-05 PROCEDURE — 77063 BREAST TOMOSYNTHESIS BI: CPT | Mod: TC | Performed by: RADIOLOGY

## 2021-11-09 ENCOUNTER — OFFICE VISIT (OUTPATIENT)
Dept: PEDIATRICS | Facility: CLINIC | Age: 61
End: 2021-11-09
Payer: COMMERCIAL

## 2021-11-09 VITALS
HEART RATE: 87 BPM | HEIGHT: 65 IN | WEIGHT: 122 LBS | SYSTOLIC BLOOD PRESSURE: 144 MMHG | OXYGEN SATURATION: 100 % | TEMPERATURE: 97.7 F | DIASTOLIC BLOOD PRESSURE: 84 MMHG | BODY MASS INDEX: 20.33 KG/M2

## 2021-11-09 DIAGNOSIS — Z00.00 ROUTINE GENERAL MEDICAL EXAMINATION AT A HEALTH CARE FACILITY: Primary | ICD-10-CM

## 2021-11-09 DIAGNOSIS — M62.830 BACK MUSCLE SPASM: ICD-10-CM

## 2021-11-09 DIAGNOSIS — I10 BENIGN ESSENTIAL HYPERTENSION: ICD-10-CM

## 2021-11-09 DIAGNOSIS — R00.2 PALPITATIONS: ICD-10-CM

## 2021-11-09 DIAGNOSIS — M85.89 OSTEOPENIA OF MULTIPLE SITES: ICD-10-CM

## 2021-11-09 LAB
ERYTHROCYTE [DISTWIDTH] IN BLOOD BY AUTOMATED COUNT: 13.9 % (ref 10–15)
HCT VFR BLD AUTO: 39.6 % (ref 35–47)
HGB BLD-MCNC: 13.4 G/DL (ref 11.7–15.7)
MCH RBC QN AUTO: 26.9 PG (ref 26.5–33)
MCHC RBC AUTO-ENTMCNC: 33.8 G/DL (ref 31.5–36.5)
MCV RBC AUTO: 80 FL (ref 78–100)
PLATELET # BLD AUTO: 313 10E3/UL (ref 150–450)
RBC # BLD AUTO: 4.98 10E6/UL (ref 3.8–5.2)
WBC # BLD AUTO: 7 10E3/UL (ref 4–11)

## 2021-11-09 PROCEDURE — 36415 COLL VENOUS BLD VENIPUNCTURE: CPT | Performed by: INTERNAL MEDICINE

## 2021-11-09 PROCEDURE — 84443 ASSAY THYROID STIM HORMONE: CPT | Performed by: INTERNAL MEDICINE

## 2021-11-09 PROCEDURE — 85027 COMPLETE CBC AUTOMATED: CPT | Performed by: INTERNAL MEDICINE

## 2021-11-09 PROCEDURE — 80048 BASIC METABOLIC PNL TOTAL CA: CPT | Performed by: INTERNAL MEDICINE

## 2021-11-09 PROCEDURE — 99214 OFFICE O/P EST MOD 30 MIN: CPT | Mod: 25 | Performed by: INTERNAL MEDICINE

## 2021-11-09 PROCEDURE — 99396 PREV VISIT EST AGE 40-64: CPT | Performed by: INTERNAL MEDICINE

## 2021-11-09 RX ORDER — CYCLOBENZAPRINE HCL 5 MG
TABLET ORAL
Qty: 30 TABLET | Refills: 1 | Status: SHIPPED | OUTPATIENT
Start: 2021-11-09

## 2021-11-09 SDOH — HEALTH STABILITY: PHYSICAL HEALTH: ON AVERAGE, HOW MANY MINUTES DO YOU ENGAGE IN EXERCISE AT THIS LEVEL?: 20 MIN

## 2021-11-09 SDOH — ECONOMIC STABILITY: INCOME INSECURITY: HOW HARD IS IT FOR YOU TO PAY FOR THE VERY BASICS LIKE FOOD, HOUSING, MEDICAL CARE, AND HEATING?: NOT HARD AT ALL

## 2021-11-09 SDOH — ECONOMIC STABILITY: INCOME INSECURITY: IN THE LAST 12 MONTHS, WAS THERE A TIME WHEN YOU WERE NOT ABLE TO PAY THE MORTGAGE OR RENT ON TIME?: NO

## 2021-11-09 SDOH — ECONOMIC STABILITY: FOOD INSECURITY: WITHIN THE PAST 12 MONTHS, THE FOOD YOU BOUGHT JUST DIDN'T LAST AND YOU DIDN'T HAVE MONEY TO GET MORE.: NEVER TRUE

## 2021-11-09 SDOH — HEALTH STABILITY: PHYSICAL HEALTH: ON AVERAGE, HOW MANY DAYS PER WEEK DO YOU ENGAGE IN MODERATE TO STRENUOUS EXERCISE (LIKE A BRISK WALK)?: 2 DAYS

## 2021-11-09 SDOH — ECONOMIC STABILITY: FOOD INSECURITY: WITHIN THE PAST 12 MONTHS, YOU WORRIED THAT YOUR FOOD WOULD RUN OUT BEFORE YOU GOT MONEY TO BUY MORE.: NEVER TRUE

## 2021-11-09 ASSESSMENT — LIFESTYLE VARIABLES
HOW MANY STANDARD DRINKS CONTAINING ALCOHOL DO YOU HAVE ON A TYPICAL DAY: 1 OR 2
HOW OFTEN DO YOU HAVE SIX OR MORE DRINKS ON ONE OCCASION: NEVER
HOW OFTEN DO YOU HAVE A DRINK CONTAINING ALCOHOL: 2-3 TIMES A WEEK

## 2021-11-09 ASSESSMENT — ENCOUNTER SYMPTOMS
SORE THROAT: 0
MYALGIAS: 0
EYE PAIN: 0
DIZZINESS: 0
FEVER: 0
ARTHRALGIAS: 0
COUGH: 0
PALPITATIONS: 1
HEARTBURN: 0
SHORTNESS OF BREATH: 1
BREAST MASS: 0
PARESTHESIAS: 0
HEMATOCHEZIA: 0
HEADACHES: 0
ABDOMINAL PAIN: 0
WEAKNESS: 0
JOINT SWELLING: 0
DIARRHEA: 0
DYSURIA: 0
NAUSEA: 0
NERVOUS/ANXIOUS: 1
CHILLS: 0
HEMATURIA: 0
CONSTIPATION: 0
FREQUENCY: 0

## 2021-11-09 ASSESSMENT — SOCIAL DETERMINANTS OF HEALTH (SDOH)
ARE YOU MARRIED, WIDOWED, DIVORCED, SEPARATED, NEVER MARRIED, OR LIVING WITH A PARTNER?: PATIENT DECLINED
HOW OFTEN DO YOU ATTEND CHURCH OR RELIGIOUS SERVICES?: NEVER
IN A TYPICAL WEEK, HOW MANY TIMES DO YOU TALK ON THE PHONE WITH FAMILY, FRIENDS, OR NEIGHBORS?: TWICE A WEEK
HOW OFTEN DO YOU GET TOGETHER WITH FRIENDS OR RELATIVES?: ONCE A WEEK
DO YOU BELONG TO ANY CLUBS OR ORGANIZATIONS SUCH AS CHURCH GROUPS UNIONS, FRATERNAL OR ATHLETIC GROUPS, OR SCHOOL GROUPS?: NO

## 2021-11-09 ASSESSMENT — MIFFLIN-ST. JEOR: SCORE: 1128.65

## 2021-11-09 NOTE — PROGRESS NOTES
SUBJECTIVE:   CC: Mercedes Lema is an 61 year old woman who presents for preventive health visit.     Patient has been advised of split billing requirements and indicates understanding: Yes  Healthy Habits:     Getting at least 3 servings of Calcium per day:  Yes    Bi-annual eye exam:  Yes    Dental care twice a year:  Yes    Sleep apnea or symptoms of sleep apnea:  None    Diet:  Regular (no restrictions)    Frequency of exercise:  2-3 days/week    Duration of exercise:  15-30 minutes    Taking medications regularly:  Yes    Medication side effects:  Not applicable    PHQ-2 Total Score: 0  cardiac concerns - has noticed times that has palpitations - couple of times a week and lasts about 30 seconds.  Some shooting, fleeting, sharp pains that make her stop what she is doing and are independent.  Occasionally gets a little short of breath.  Has had times of feeling lightheaded, not dizzy.  No loc.  No pre-syncope.      Rt ear - hears something that goes to the beat of heart like a stretching noise.  If repositions head can get it to stop.  Happens when awake and going to sleep at night.      BP - off medications.  Checks occasionally at home.  Has been ok, diastolic sometimes as high as 90 and systolic highest 130's.  Typical 130's/80's.      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  Anxious about change.    Exercise - hoping to increase with residential    Left hip bothersome - lateral pain sometimes.      Today's PHQ-2 Score:   PHQ-2 ( 1999 [employer]) 11/9/2021   Q1: Little interest or pleasure in doing things 0   Q2: Feeling down, depressed or hopeless 0   PHQ-2 Score 0   Q1: Little interest or pleasure in doing things Not at all   Q2: Feeling down, depressed or hopeless Not at all   PHQ-2 Score 0     Abuse: Current or Past (Physical, Sexual or Emotional) - No  Do you feel safe in your environment? Yes    Social History     Tobacco Use     Smoking status: Never Smoker     Smokeless tobacco: Never Used    Substance Use Topics     Alcohol use: Yes     Comment: Glass of wine/beer 3 days a week     Alcohol Use 11/9/2021   Prescreen: >3 drinks/day or >7 drinks/week? No   Prescreen: >3 drinks/day or >7 drinks/week? -     Reviewed orders with patient.  Reviewed health maintenance and updated orders accordingly - Yes  Labs reviewed in EPIC    Breast Cancer Screening:  Any new diagnosis of family breast, ovarian, or bowel cancer? No    FHS-7: No flowsheet data found.  Mammogram Screening: Recommended mammography every 1-2 years with patient discussion and risk factor consideration  Pertinent mammograms are reviewed under the imaging tab.    History of abnormal Pap smear: NO - age 30-65 PAP every 5 years with negative HPV co-testing recommended  PAP / HPV Latest Ref Rng & Units 8/25/2017 4/24/2014 8/5/2010   PAP (Historical) - NIL NIL NIL   HPV16 NEG:Negative Negative - -   HPV18 NEG:Negative Negative - -   HRHPV NEG:Negative Negative - -     Reviewed and updated as needed this visit by clinical staff  Tobacco  Allergies  Meds  Problems  Med Hx  Surg Hx  Fam Hx  Soc Hx         Reviewed and updated as needed this visit by Provider  Tobacco  Allergies  Meds  Problems  Med Hx  Surg Hx  Fam Hx            Review of Systems   Constitutional: Negative for chills and fever.   HENT: Negative for congestion, ear pain, hearing loss and sore throat.    Eyes: Negative for pain and visual disturbance.   Respiratory: Positive for shortness of breath. Negative for cough.    Cardiovascular: Positive for chest pain and palpitations. Negative for peripheral edema.   Gastrointestinal: Negative for abdominal pain, constipation, diarrhea, heartburn, hematochezia and nausea.   Breasts:  Negative for tenderness, breast mass and discharge.   Genitourinary: Negative for dysuria, frequency, genital sores, hematuria, pelvic pain, urgency, vaginal bleeding and vaginal discharge.   Musculoskeletal: Negative for arthralgias, joint swelling  "and myalgias.   Skin: Negative for rash.   Neurological: Negative for dizziness, weakness, headaches and paresthesias.   Psychiatric/Behavioral: Negative for mood changes. The patient is nervous/anxious.           OBJECTIVE:   BP (!) 144/84 (BP Location: Right arm, Patient Position: Sitting, Cuff Size: Adult Regular)   Pulse 87   Temp 97.7  F (36.5  C) (Tympanic)   Ht 1.65 m (5' 4.96\")   Wt 55.3 kg (122 lb)   LMP 02/08/2013   SpO2 100%   BMI 20.33 kg/m    Physical Exam  GENERAL: healthy, alert and no distress  EYES: Eyes grossly normal to inspection, PERRL and conjunctivae and sclerae normal  HENT: ear canals and TM's normal, nose and mouth without ulcers or lesions  NECK: no adenopathy, no asymmetry, masses, or scars and thyroid normal to palpation  RESP: lungs clear to auscultation - no rales, rhonchi or wheezes  CV: regular rate and rhythm, normal S1 S2, no S3 or S4, no murmur, click or rub, no peripheral edema and peripheral pulses strong  ABDOMEN: soft, nontender, no hepatosplenomegaly, no masses and bowel sounds normal  MS: no gross musculoskeletal defects noted, no edema  SKIN: no suspicious lesions or rashes  NEURO: Normal strength and tone, mentation intact and speech normal  PSYCH: mentation appears normal, affect normal/bright    Diagnostic Test Results:  Labs reviewed in Epic    ASSESSMENT/PLAN:   Routine general medical examination at a health care facility  Routine health education discussed: calcium, diet, exercise, weight, safety.     Palpitations  Will screen for metabolic cause with labs per orders.   - Basic metabolic panel  (Ca, Cl, CO2, Creat, Gluc, K, Na, BUN); Future  - TSH with free T4 reflex; Future  - CBC with platelets; Future  - Basic metabolic panel  (Ca, Cl, CO2, Creat, Gluc, K, Na, BUN)  - TSH with free T4 reflex  - CBC with platelets    Benign essential hypertension  Uncontrolled.  Prefers to work on lifestyle change.  Follow-up to recheck in 2-3 months   - Basic metabolic " "panel  (Ca, Cl, CO2, Creat, Gluc, K, Na, BUN); Future  - Basic metabolic panel  (Ca, Cl, CO2, Creat, Gluc, K, Na, BUN)    Osteopenia of multiple sites  discussed need for calcium, vit d, exercise and recheck dexa per      Back muscle spasm  Refill for rare use  - cyclobenzaprine (FLEXERIL) 5 MG tablet; 1-2 po tid prn        Patient has been advised of split billing requirements and indicates understanding: Yes  COUNSELING:  Reviewed preventive health counseling, as reflected in patient instructions    Estimated body mass index is 20.33 kg/m  as calculated from the following:    Height as of this encounter: 1.65 m (5' 4.96\").    Weight as of this encounter: 55.3 kg (122 lb).        She reports that she has never smoked. She has never used smokeless tobacco.     The 10-year ASCVD risk score (Valorie MAC Jr., et al., 2013) is: 4.6%    Values used to calculate the score:      Age: 61 years      Sex: Female      Is Non- : No      Diabetic: No      Tobacco smoker: No      Systolic Blood Pressure: 144 mmHg      Is BP treated: Yes      HDL Cholesterol: 57 mg/dL      Total Cholesterol: 185 mg/dL       Counseling Resources:  ATP IV Guidelines  Pooled Cohorts Equation Calculator  Breast Cancer Risk Calculator  BRCA-Related Cancer Risk Assessment: FHS-7 Tool  FRAX Risk Assessment  ICSI Preventive Guidelines  Dietary Guidelines for Americans, 2010  USDA's MyPlate  ASA Prophylaxis  Lung CA Screening    Tabitha Quintero MD  [facility]    "

## 2021-11-09 NOTE — PATIENT INSTRUCTIONS
Preventive Health Recommendations  Female Ages 50 - 64    Yearly exam: See your health care provider every year in order to  o Review health changes.   o Discuss preventive care.    o Review your medicines if your doctor has prescribed any.        Do a pap next year      Have a mammogram every 1 to 2 years.    Have a colonoscopy at age 50, or have a yearly FIT test (stool test). These exams screen for colon cancer.      Have a cholesterol test every 5 years, or more often if advised.    Have a diabetes test (fasting glucose) every three years. If you are at risk for diabetes, you should have this test more often.     If you are at risk for osteoporosis (brittle bone disease), think about having a bone density scan (DEXA).    Shots: Get a flu shot each year. Get a tetanus shot every 10 years.    Nutrition:     Eat at least 5 servings of fruits and vegetables each day.    Eat whole-grain bread, whole-wheat pasta and brown rice instead of white grains and rice.    Get adequate Calcium and Vitamin D.     Lifestyle    Exercise at least 150 minutes a week (30 minutes a day, 5 days a week). This will help you control your weight and prevent disease.    Limit alcohol to one drink per day.    No smoking.     Wear sunscreen to prevent skin cancer.     See your dentist every six months for an exam and cleaning.    See your eye doctor every 1 to 2 years.    Let me know if you want a PT referral for your hip    I encourage you to increase your activity to a goal of at least 150 mins/wk or 10,000 steps/day.  However, every little bit of activity you do is good for you!  Some methods to make exercise more fun (or less unfun) include:  ? Be social: exercise with friends, a group, or a good, qualified .  ? Entertain yourself: listen to music, podcasts, or books, or watch a movie.  ? Exercise outside in a beautiful environment.  ? Dance or play sports and games.  ? Variety is enjoyable - experiment and mix things  up.  ? Choose realistic goals based on time, not performance, so you don t set yourself up for disappointment.  ? Reward yourself for exercising.  Remember to start slow and you can gradually increase.  Exercising takes time to become less unpleasant and hopefully eventually becomes enjoyable. Because we were not designed to be inactive and out of shape, the changes in our bodies that make physical activity feel rewarding and become a habit develop only after the several months of effort it takes to improve fitness. Slowly and gradually, exercise switches from being a negative feedback loop in which discomfort and lack of reward discourage us from exercising again to being a positive feedback loop in which exercise becomes satisfying. (Suggestions from Samantha Teran, for more information on his work: https://www.[employer].com/2021/[email or web address]/books/review/blefpyqfi-ygfldk-chjwdbkrq.html)

## 2021-11-10 LAB
ANION GAP SERPL CALCULATED.3IONS-SCNC: 6 MMOL/L (ref 3–14)
BUN SERPL-MCNC: 13 MG/DL (ref 7–30)
CALCIUM SERPL-MCNC: 9 MG/DL (ref 8.5–10.1)
CHLORIDE BLD-SCNC: 101 MMOL/L (ref 94–109)
CO2 SERPL-SCNC: 25 MMOL/L (ref 20–32)
CREAT SERPL-MCNC: 0.74 MG/DL (ref 0.52–1.04)
GFR SERPL CREATININE-BSD FRML MDRD: 89 ML/MIN/1.73M2
GLUCOSE BLD-MCNC: 119 MG/DL (ref 70–99)
POTASSIUM BLD-SCNC: 4.4 MMOL/L (ref 3.4–5.3)
SODIUM SERPL-SCNC: 132 MMOL/L (ref 133–144)
TSH SERPL DL<=0.005 MIU/L-ACNC: 0.5 MU/L (ref 0.4–4)

## 2022-02-08 ENCOUNTER — MYC MEDICAL ADVICE (OUTPATIENT)
Dept: PEDIATRICS | Facility: CLINIC | Age: 62
End: 2022-02-08

## 2022-02-08 ENCOUNTER — VIRTUAL VISIT (OUTPATIENT)
Dept: PEDIATRICS | Facility: CLINIC | Age: 62
End: 2022-02-08
Payer: COMMERCIAL

## 2022-02-08 DIAGNOSIS — I10 BENIGN ESSENTIAL HYPERTENSION: Primary | ICD-10-CM

## 2022-02-08 DIAGNOSIS — I10 BENIGN ESSENTIAL HYPERTENSION: ICD-10-CM

## 2022-02-08 PROCEDURE — 99214 OFFICE O/P EST MOD 30 MIN: CPT | Mod: 95 | Performed by: INTERNAL MEDICINE

## 2022-02-08 RX ORDER — AMLODIPINE BESYLATE 2.5 MG/1
2.5 TABLET ORAL DAILY
Qty: 30 TABLET | Refills: 1 | Status: SHIPPED | OUTPATIENT
Start: 2022-02-08 | End: 2022-02-25

## 2022-02-08 NOTE — PROGRESS NOTES
Mercedes is a 61 year old who is being evaluated via a billable telephone visit.      What phone number would you like to be contacted at? 27  How would you like to obtain your AVS? MyChart    Assessment & Plan     Benign essential hypertension  Discussed sodium restriction, maintaining ideal body weight and regular exercise program as physiologic means to achieve blood pressure control. The patient will strive towards this. Meanwhile, it is appropriate to lower BP with medications, while observing for therapeutic effect and if appropriate later, can discontinue medications if physiologic methods appear to be effective. The patient indicates understanding of these issues and agrees with the plan. The various types of antihypertensives are discussed fully. [facility] Side effects explained in detail. Continue home readings and  followup on mychart as scheduled.  Discussed cardiac risk reduction.  No family history and risk is low so scan or stress test not indicated in absence of symptoms.  - amLODIPine (NORVASC) 2.5 MG tablet; Take 1 tablet (2.5 mg) by mouth daily      I spent a total of 32 minutes on the day of the visit.   Time spent doing chart review, history and exam, documentation and further activities per the note       See Patient Instructions    Return in about 2 weeks (around 2/22/2022) for Recheck on mychart and then physical in November.    Tabitha Quintero MD  [facility] Long is a 61 year old who presents for the following health issues     HPI   hypertension - BPs at home high recently:   Sunday, January 30: 145/93  Monday, January 31: 149/93  Tuesday, February 1:  134/94  Wednesday, February 2: 149/92  2/3 - 131/83, 141/93  2/4 - 137/86, 144/93  2/5 - 130/86  2/6 - 140/85, 141/91  2/7 - 133/86,  2/8 - 131/89  lightheadedness, fatigue, Headache/pressure in my head.  NO dyspnea on exertion.  No chest pain.  Just no energy.  Hard to stick  with exercise as tired.  Taking BP each morning and another time later in day.  BP Readings from Last 6 Encounters:   11/09/21 (!) 144/84   11/06/20 118/74   01/27/20 130/82   01/04/20 138/80   12/06/19 (!) 154/92   10/29/19 132/80    exercise - 3 times in 10 days.  Had been 4-5x/wk  Retired in November.  Diet unchanged - tries to get vegetables, has some salty snacks and sweets.  No smoking  Alcohol - has been cutting back. Had been 3-4 times a week having one or maybe two drinks with dinner    Review of Systems         Objective           Vitals:  No vitals were obtained today due to virtual visit.    Physical Exam   healthy, alert and no distress  PSYCH: Alert and oriented times 3; coherent speech, normal   rate and volume, able to articulate logical thoughts, able   to abstract reason, no tangential thoughts, no hallucinations   or delusions  Her affect is normal  RESP: No cough, no audible wheezing, able to talk in full sentences  Remainder of exam unable to be completed due to telephone visits                Phone call duration: 27 minutes    The 10-year ASCVD risk score (Valorie ESTEFANIA Jr., et al., 2013) is: 4.6%    Values used to calculate the score:      Age: 61 years      Sex: Female      Is Non- : No      Diabetic: No      Tobacco smoker: No      Systolic Blood Pressure: 144 mmHg      Is BP treated: Yes      HDL Cholesterol: 57 mg/dL      Total Cholesterol: 185 mg/dL

## 2022-02-08 NOTE — PATIENT INSTRUCTIONS
Start the amlodipine one pill each morning.  It takes 2 weeks to be fully effective.    Patient Education     Eating Heart-Healthy Food: Using the DASH Plan    Eating for your heart doesn t have to be hard or boring. You just need to know how to make healthier choices. The DASH eating plan has been developed to help you do just that. DASH stands for Dietary Approaches to Stop Hypertension. It is a plan that has been proven to be healthier for your heart and to lower your risk for high blood pressure. It can also help lower your risk for cancer, heart disease, osteoporosis, and diabetes.  Choosing from each food group  Choose foods from each of the food groups below each day. Try to get the recommended number of servings for each food group. The serving numbers are based on a diet of 2,000 calories a day. Talk with your healthcare provider if you re not sure about your calorie needs. Along with getting the correct servings, the DASH plan also advises less than 2,300 mg of salt (sodium) per day. Lowering sodium intake to 1,500 mg per day lowers blood pressure even more. (There's about 2,300 mg of sodium in 1 teaspoon of salt.)      Grains  Servings: 6 to 8 a day  A serving is:    1 slice bread    1 ounce dry cereal    Half a cup cooked rice, pasta or cereal  Best choices: Whole grains and any grains high in fiber. Vegetables  Servings: 4 to 5 a day  A serving is:    1 cup raw leafy vegetable    Half a cup cut-up raw or cooked vegetable    Half a cup vegetable juice  Best choices: Fresh or frozen vegetables prepared without added salt or fat.   Fruits  Servings: 4 to 5 a day  A serving is:    1 medium fruit    One-quarter cup dried fruit    Half a cup fresh, frozen, or canned fruit    Half a cup of 100% fruit juices  Best choices: A variety of fresh fruits of different colors. Whole fruits are a better choice than fruit juices. Low-fat or fat-free dairy  Servings: 2 to 3 a day  A serving is:    1 cup milk    1 cup  yogurt    One and a half ounces cheese  Best choices: Skim or 1% milk, low-fat or fat-free yogurt or buttermilk, and low-fat cheeses.         Lean meats, poultry, fish  Servings: 6 or fewer a day  A serving is:    1 ounce cooked meats, poultry, or fish    1 egg  Best choices: Lean poultry and fish. Trim away visible fat. Broil, grill, roast, or boil instead of frying. Remove skin from poultry before eating. Limit how much red meat you eat.  Nuts, seeds, beans  Servings: 4 to 5 a week  A serving is:    One-third cup nuts (one and a half ounces)    2 tablespoons nut butter or seeds    Half a cup cooked dry beans or legumes  Best choices: Dry roasted nuts with no salt added, lentils, kidney beans, garbanzo beans, and whole anguiano beans.   Fats and oils  Servings: 2 to 3 a day  A serving is:    1 teaspoon vegetable oil    1 teaspoon soft margarine    1 tablespoon mayonnaise    2 tablespoons salad dressing  Best choices: Nut and vegetable oils (nontropical vegetable oils), such as olive and canola oil. Sweets  Servings: 5 a week or fewer  A serving is:    1 tablespoon sugar, maple syrup, or honey    1 tablespoon jam or jelly    1 half-ounce jelly beans (about 15)    1 cup lemonade  Best choices: Dried fruit can be a satisfying sweet. Choose low-fat sweets. And watch your serving sizes!      For more on the DASH eating plan, visit:  www.nhlbi.nih.gov/health/health-topics/topics/dash   Cara last reviewed this educational content on 7/1/2019 2000-2021 [employer]. All rights reserved. This information is not intended as a substitute for professional medical care. Always follow your healthcare professional's instructions.

## 2022-02-25 RX ORDER — AMLODIPINE BESYLATE 2.5 MG/1
2.5 TABLET ORAL DAILY
Qty: 90 TABLET | Refills: 2 | Status: SHIPPED | OUTPATIENT
Start: 2022-02-25 | End: 2022-04-18

## 2022-03-17 ENCOUNTER — NURSE TRIAGE (OUTPATIENT)
Dept: PEDIATRICS | Facility: CLINIC | Age: 62
End: 2022-03-17
Payer: COMMERCIAL

## 2022-03-29 ENCOUNTER — TRANSFERRED RECORDS (OUTPATIENT)
Dept: HEALTH INFORMATION MANAGEMENT | Facility: CLINIC | Age: 62
End: 2022-03-29
Payer: COMMERCIAL

## 2022-04-12 ENCOUNTER — TRANSFERRED RECORDS (OUTPATIENT)
Dept: HEALTH INFORMATION MANAGEMENT | Facility: CLINIC | Age: 62
End: 2022-04-12
Payer: COMMERCIAL

## 2022-04-16 ENCOUNTER — ALLIED HEALTH/NURSE VISIT (OUTPATIENT)
Dept: PEDIATRICS | Facility: CLINIC | Age: 62
End: 2022-04-16
Payer: COMMERCIAL

## 2022-04-16 VITALS — SYSTOLIC BLOOD PRESSURE: 144 MMHG | DIASTOLIC BLOOD PRESSURE: 84 MMHG

## 2022-04-16 DIAGNOSIS — Z01.30 BP CHECK: Primary | ICD-10-CM

## 2022-04-16 PROCEDURE — 99207 PR NO CHARGE NURSE ONLY: CPT | Performed by: INTERNAL MEDICINE

## 2022-04-16 NOTE — PROGRESS NOTES
Mercedes Lema was evaluated at [facility] on April 16, 2022 at which time her blood pressure was:    BP Readings from Last 3 Encounters:   04/16/22 (!) 144/84   11/09/21 (!) 144/84   11/06/20 118/74     Pulse Readings from Last 3 Encounters:   11/09/21 87   11/06/20 60   01/27/20 84       Reviewed lifestyle modifications for blood pressure control and reduction: including making healthy food choices, managing weight, getting regular exercise, smoking cessation, reducing alcohol consumption, monitoring blood pressure regularly.     Symptoms: None    BP Goal:< 140/90 mmHg    BP Assessment:  BP too high    Potential Reasons for BP too high: Dose of BP medication too low    BP Follow-Up Plan: Referral to PCP    Recommendation to Provider: Consider increasing amlodipine dose.    Note completed by: Buzz Ocampo, PharmD, BCACP  [facility]  [phone]

## 2022-04-16 NOTE — Clinical Note
Mercedes DONATO Torey was evaluated in a [facility] today at which time her blood pressure was noted to be elevated. She has been advised to follow up with her primary care provider or with a nurse only visit.  We are also routing this message to her primary care provider as an FYI.

## 2022-05-04 ENCOUNTER — ALLIED HEALTH/NURSE VISIT (OUTPATIENT)
Dept: PEDIATRICS | Facility: CLINIC | Age: 62
End: 2022-05-04
Payer: COMMERCIAL

## 2022-05-04 VITALS — DIASTOLIC BLOOD PRESSURE: 80 MMHG | SYSTOLIC BLOOD PRESSURE: 138 MMHG

## 2022-05-04 DIAGNOSIS — Z01.30 BP CHECK: Primary | ICD-10-CM

## 2022-05-04 PROCEDURE — 99207 PR NO CHARGE NURSE ONLY: CPT | Performed by: INTERNAL MEDICINE

## 2022-05-04 NOTE — PROGRESS NOTES
Mercedes Lema was evaluated at [facility] on May 4, 2022 at which time her blood pressure was:    BP Readings from Last 3 Encounters:   05/04/22 138/80   04/16/22 (!) 144/84   11/09/21 (!) 144/84     Pulse Readings from Last 3 Encounters:   11/09/21 87   11/06/20 60   01/27/20 84       Reviewed lifestyle modifications for blood pressure control and reduction: including making healthy food choices, managing weight, getting regular exercise, smoking cessation, reducing alcohol consumption, monitoring blood pressure regularly.     Symptoms: None    BP Goal:< 140/90 mmHg    BP Assessment:  BP at goal    Potential Reasons for BP too high: NA - Not applicable    BP Follow-Up Plan: Recheck BP in 6 months at pharmacy    Recommendation to Provider:     Note completed by: Thank you,  Hillary Sal, Pharmacist  [facility]

## 2022-05-05 ENCOUNTER — TRANSFERRED RECORDS (OUTPATIENT)
Dept: HEALTH INFORMATION MANAGEMENT | Facility: CLINIC | Age: 62
End: 2022-05-05
Payer: COMMERCIAL

## 2022-05-09 ENCOUNTER — MYC MEDICAL ADVICE (OUTPATIENT)
Dept: PEDIATRICS | Facility: CLINIC | Age: 62
End: 2022-05-09
Payer: COMMERCIAL

## 2022-05-09 DIAGNOSIS — I10 BENIGN ESSENTIAL HYPERTENSION: ICD-10-CM

## 2022-05-09 NOTE — TELEPHONE ENCOUNTER
"Dr Quintero: See pt message and advise.      - Renzo \"Travis\" Juliano (he/him/his), RN - Patient Advocate Liason (PAL)  [facility]    "

## 2022-05-10 RX ORDER — AMLODIPINE BESYLATE 2.5 MG/1
2.5 TABLET ORAL DAILY
Qty: 90 TABLET | Refills: 0 | Status: SHIPPED | OUTPATIENT
Start: 2022-05-10 | End: 2022-05-10

## 2022-05-10 RX ORDER — AMLODIPINE BESYLATE 2.5 MG/1
2.5 TABLET ORAL DAILY
COMMUNITY
End: 2022-07-11 | Stop reason: DRUGHIGH

## 2022-07-05 ENCOUNTER — MYC MEDICAL ADVICE (OUTPATIENT)
Dept: PEDIATRICS | Facility: CLINIC | Age: 62
End: 2022-07-05

## 2022-07-05 DIAGNOSIS — I10 BENIGN ESSENTIAL HYPERTENSION: Primary | ICD-10-CM

## 2022-07-05 RX ORDER — AMLODIPINE BESYLATE 2.5 MG/1
3.75 TABLET ORAL DAILY
Qty: 135 TABLET | Refills: 1 | Status: SHIPPED | OUTPATIENT
Start: 2022-07-05 | End: 2022-11-11

## 2022-07-08 ENCOUNTER — ANCILLARY PROCEDURE (OUTPATIENT)
Dept: MAMMOGRAPHY | Facility: CLINIC | Age: 62
End: 2022-07-08
Attending: INTERNAL MEDICINE
Payer: COMMERCIAL

## 2022-07-08 DIAGNOSIS — Z12.31 VISIT FOR SCREENING MAMMOGRAM: ICD-10-CM

## 2022-07-08 PROCEDURE — 77063 BREAST TOMOSYNTHESIS BI: CPT | Mod: TC | Performed by: RADIOLOGY

## 2022-07-08 PROCEDURE — 77067 SCR MAMMO BI INCL CAD: CPT | Mod: TC | Performed by: RADIOLOGY

## 2022-10-22 ENCOUNTER — HEALTH MAINTENANCE LETTER (OUTPATIENT)
Age: 62
End: 2022-10-22

## 2022-11-11 ENCOUNTER — OFFICE VISIT (OUTPATIENT)
Dept: PEDIATRICS | Facility: CLINIC | Age: 62
End: 2022-11-11
Payer: COMMERCIAL

## 2022-11-11 VITALS
HEART RATE: 85 BPM | BODY MASS INDEX: 19.33 KG/M2 | RESPIRATION RATE: 16 BRPM | WEIGHT: 116 LBS | DIASTOLIC BLOOD PRESSURE: 70 MMHG | SYSTOLIC BLOOD PRESSURE: 136 MMHG | TEMPERATURE: 97.4 F | OXYGEN SATURATION: 97 % | HEIGHT: 65 IN

## 2022-11-11 DIAGNOSIS — I10 BENIGN ESSENTIAL HYPERTENSION: ICD-10-CM

## 2022-11-11 DIAGNOSIS — Z00.00 ROUTINE GENERAL MEDICAL EXAMINATION AT A HEALTH CARE FACILITY: Primary | ICD-10-CM

## 2022-11-11 DIAGNOSIS — F40.243 ANXIETY WITH FLYING: ICD-10-CM

## 2022-11-11 DIAGNOSIS — Z12.4 CERVICAL CANCER SCREENING: ICD-10-CM

## 2022-11-11 PROCEDURE — 36415 COLL VENOUS BLD VENIPUNCTURE: CPT | Performed by: INTERNAL MEDICINE

## 2022-11-11 PROCEDURE — G0123 SCREEN CERV/VAG THIN LAYER: HCPCS | Performed by: INTERNAL MEDICINE

## 2022-11-11 PROCEDURE — 87624 HPV HI-RISK TYP POOLED RSLT: CPT | Performed by: INTERNAL MEDICINE

## 2022-11-11 PROCEDURE — 99396 PREV VISIT EST AGE 40-64: CPT | Performed by: INTERNAL MEDICINE

## 2022-11-11 PROCEDURE — 80048 BASIC METABOLIC PNL TOTAL CA: CPT | Performed by: INTERNAL MEDICINE

## 2022-11-11 RX ORDER — LORAZEPAM 0.5 MG/1
0.5 TABLET ORAL EVERY 6 HOURS PRN
Qty: 10 TABLET | Refills: 0 | Status: SHIPPED | OUTPATIENT
Start: 2022-11-11 | End: 2023-11-17

## 2022-11-11 RX ORDER — AMLODIPINE BESYLATE 5 MG/1
5 TABLET ORAL DAILY
Qty: 90 TABLET | Refills: 3 | Status: SHIPPED | OUTPATIENT
Start: 2022-11-11 | End: 2023-11-17

## 2022-11-11 RX ORDER — AMLODIPINE BESYLATE 2.5 MG/1
3.75 TABLET ORAL DAILY
Qty: 135 TABLET | Refills: 3 | Status: SHIPPED | OUTPATIENT
Start: 2022-11-11 | End: 2022-11-11

## 2022-11-11 ASSESSMENT — ENCOUNTER SYMPTOMS
FREQUENCY: 0
SHORTNESS OF BREATH: 0
EYE PAIN: 0
HEARTBURN: 0
JOINT SWELLING: 0
DIARRHEA: 0
FEVER: 0
BREAST MASS: 0
SORE THROAT: 0
NAUSEA: 0
HEADACHES: 0
ARTHRALGIAS: 0
PALPITATIONS: 0
CONSTIPATION: 0
ABDOMINAL PAIN: 0
WEAKNESS: 0
CHILLS: 0
NERVOUS/ANXIOUS: 1
HEMATOCHEZIA: 0
COUGH: 0
DYSURIA: 0
HEMATURIA: 0
MYALGIAS: 0
DIZZINESS: 0
PARESTHESIAS: 0

## 2022-11-11 ASSESSMENT — PAIN SCALES - GENERAL: PAINLEVEL: NO PAIN (0)

## 2022-11-11 NOTE — PATIENT INSTRUCTIONS
I will send the lorazepam refill.  Talk to your vet about changing the timing of lasix.      Preventive Health Recommendations  Female Ages 50 - 64    Yearly exam: See your health care provider every year in order to  Review health changes.   Discuss preventive care.    Review your medicines if your doctor has prescribed any.    Get a Pap test every three years (unless you have an abnormal result and your provider advises testing more often).  If you get Pap tests with HPV test, you only need to test every 5 years, unless you have an abnormal result.   You do not need a Pap test if your uterus was removed (hysterectomy) and you have not had cancer.  You should be tested each year for STDs (sexually transmitted diseases) if you're at risk.   Have a mammogram every 1 to 2 years.  Have a colonoscopy at age 50, or have a yearly FIT test (stool test). These exams screen for colon cancer.    Have a cholesterol test every 5 years, or more often if advised.  Have a diabetes test (fasting glucose) every three years. If you are at risk for diabetes, you should have this test more often.   If you are at risk for osteoporosis (brittle bone disease), think about having a bone density scan (DEXA).    Shots: Get a flu shot each year. Get a tetanus shot every 10 years.    Nutrition:   Eat at least 5 servings of fruits and vegetables each day.  Eat whole-grain bread, whole-wheat pasta and brown rice instead of white grains and rice.  Get adequate Calcium and Vitamin D.     Lifestyle  Exercise at least 150 minutes a week (30 minutes a day, 5 days a week). This will help you control your weight and prevent disease.  Limit alcohol to one drink per day.  No smoking.   Wear sunscreen to prevent skin cancer.   See your dentist every six months for an exam and cleaning.  See your eye doctor every 1 to 2 years.

## 2022-11-11 NOTE — PROGRESS NOTES
SUBJECTIVE:   CC: Mercedes is an 62 year old who presents for preventive health visit.       Patient has been advised of split billing requirements and indicates understanding: Yes  Healthy Habits:     Getting at least 3 servings of Calcium per day:  Yes    Bi-annual eye exam:  Yes    Dental care twice a year:  Yes    Sleep apnea or symptoms of sleep apnea:  None    Diet:  Regular (no restrictions)    Frequency of exercise:  1 day/week    Duration of exercise:  Less than 15 minutes    Taking medications regularly:  Yes    PHQ-2 Total Score: 0    Additional concerns today:  No  Not exercising due to dog's health  Anxiety with flying - needs refill    Today's PHQ-2 Score:   PHQ-2 ( 1999 Pfizer) 11/11/2022   Q1: Little interest or pleasure in doing things 0   Q2: Feeling down, depressed or hopeless 0   PHQ-2 Score 0   PHQ-2 Total Score (12-17 Years)- Positive if 3 or more points; Administer PHQ-A if positive -   Q1: Little interest or pleasure in doing things Not at all   Q2: Feeling down, depressed or hopeless Not at all   PHQ-2 Score 0       Abuse: Current or Past (Physical, Sexual or Emotional) - No  Do you feel safe in your environment? Yes    Have you ever done Advance Care Planning? (For example, a Health Directive, POLST, or a discussion with a medical provider or your loved ones about your wishes): Yes, advance care planning is on file.    Social History     Tobacco Use     Smoking status: Never     Smokeless tobacco: Never   Substance Use Topics     Alcohol use: Yes     Comment: Glass of wine/beer 3 days a week         Alcohol Use 11/11/2022   Prescreen: >3 drinks/day or >7 drinks/week? No   Prescreen: >3 drinks/day or >7 drinks/week? -       Reviewed orders with patient.  Reviewed health maintenance and updated orders accordingly - Yes  Labs reviewed in EPIC    Breast Cancer Screening:    Breast CA Risk Assessment (FHS-7) 11/9/2021   Do you have a family history of breast, colon, or ovarian cancer? No /  "Unknown         Mammogram Screening: Recommended mammography every 1-2 years with patient discussion and risk factor consideration  Pertinent mammograms are reviewed under the imaging tab.    History of abnormal Pap smear: NO - age 30-65 PAP every 5 years with negative HPV co-testing recommended  PAP / HPV Latest Ref Rng & Units 8/25/2017 4/24/2014 8/5/2010   PAP (Historical) - NIL NIL NIL   HPV16 NEG:Negative Negative - -   HPV18 NEG:Negative Negative - -   HRHPV NEG:Negative Negative - -     Reviewed and updated as needed this visit by clinical staff   Tobacco  Allergies  Meds  Problems  Med Hx   Fam Hx          Reviewed and updated as needed this visit by Provider     Meds  Problems    Fam Hx             Review of Systems   Constitutional: Negative for chills and fever.   HENT: Negative for congestion, ear pain, hearing loss and sore throat.    Eyes: Negative for pain and visual disturbance.   Respiratory: Negative for cough and shortness of breath.    Cardiovascular: Negative for chest pain, palpitations and peripheral edema.   Gastrointestinal: Negative for abdominal pain, constipation, diarrhea, heartburn, hematochezia and nausea.   Breasts:  Negative for tenderness, breast mass and discharge.   Genitourinary: Negative for dysuria, frequency, genital sores, hematuria, pelvic pain, urgency, vaginal bleeding and vaginal discharge.   Musculoskeletal: Negative for arthralgias, joint swelling and myalgias.   Skin: Negative for rash.   Neurological: Negative for dizziness, weakness, headaches and paresthesias.   Psychiatric/Behavioral: Negative for mood changes. The patient is nervous/anxious.         OBJECTIVE:   /70   Pulse 85   Temp 97.4  F (36.3  C)   Resp 16   Ht 1.65 m (5' 4.96\")   Wt 52.6 kg (116 lb)   LMP 02/08/2013   SpO2 97%   BMI 19.33 kg/m    Physical Exam  GENERAL: healthy, alert and no distress  EYES: Eyes grossly normal to inspection, PERRL and conjunctivae and sclerae " "normal  HENT: ear canals and TM's normal, nose and mouth without ulcers or lesions  NECK: no adenopathy, no asymmetry, masses, or scars and thyroid normal to palpation  RESP: lungs clear to auscultation - no rales, rhonchi or wheezes  CV: regular rate and rhythm, normal S1 S2, no S3 or S4, no murmur, click or rub, no peripheral edema and peripheral pulses strong  ABDOMEN: soft, nontender, no hepatosplenomegaly, no masses and bowel sounds normal   (female): normal female external genitalia, normal urethral meatus , vaginal mucosa pink, moist, well rugated and normal cervix  MS: no gross musculoskeletal defects noted, no edema  SKIN: no suspicious lesions or rashes  NEURO: Normal strength and tone, mentation intact and speech normal  PSYCH: mentation appears normal, affect normal/bright    Diagnostic Test Results:  Labs reviewed in Epic    ASSESSMENT/PLAN:   Routine general medical examination at a health care facility  Routine health education discussed: calcium, diet, exercise, weight, safety.     Benign essential hypertension  Controlled here but high at home per pt.  Increase amlodipine slightly.  Notify if gets side effects or BP not controlled.  - amLODIPine (NORVASC) 5 MG tablet; Take 1 tablet (5 mg) by mouth daily  - Basic metabolic panel  (Ca, Cl, CO2, Creat, Gluc, K, Na, BUN); Future  - Basic metabolic panel  (Ca, Cl, CO2, Creat, Gluc, K, Na, BUN)    Anxiety with flying  Discussed medication use, refill  - LORazepam (ATIVAN) 0.5 MG tablet; Take 1 tablet (0.5 mg) by mouth every 6 hours as needed for anxiety    Cervical cancer screening    - Pap Screen with HPV - recommended age 30 - 65 years              COUNSELING:  Reviewed preventive health counseling, as reflected in patient instructions    Estimated body mass index is 19.33 kg/m  as calculated from the following:    Height as of this encounter: 1.65 m (5' 4.96\").    Weight as of this encounter: 52.6 kg (116 lb).        She reports that she has never " smoked. She has never used smokeless tobacco.      Counseling Resources:  ATP IV Guidelines  Pooled Cohorts Equation Calculator  Breast Cancer Risk Calculator  BRCA-Related Cancer Risk Assessment: FHS-7 Tool  FRAX Risk Assessment  ICSI Preventive Guidelines  Dietary Guidelines for Americans, 2010  USDA's MyPlate  ASA Prophylaxis  Lung CA Screening    Tabitha Quintero MD  [facility]

## 2022-11-12 LAB
ANION GAP SERPL CALCULATED.3IONS-SCNC: 9 MMOL/L (ref 3–14)
BUN SERPL-MCNC: 20 MG/DL (ref 7–30)
CALCIUM SERPL-MCNC: 9.4 MG/DL (ref 8.5–10.1)
CHLORIDE BLD-SCNC: 109 MMOL/L (ref 94–109)
CO2 SERPL-SCNC: 22 MMOL/L (ref 20–32)
CREAT SERPL-MCNC: 0.86 MG/DL (ref 0.52–1.04)
GFR SERPL CREATININE-BSD FRML MDRD: 77 ML/MIN/1.73M2
GLUCOSE BLD-MCNC: 99 MG/DL (ref 70–99)
POTASSIUM BLD-SCNC: 5.1 MMOL/L (ref 3.4–5.3)
SODIUM SERPL-SCNC: 140 MMOL/L (ref 133–144)

## 2022-11-16 LAB
BKR LAB AP GYN ADEQUACY: NORMAL
BKR LAB AP GYN INTERPRETATION: NORMAL
BKR LAB AP HPV REFLEX: NORMAL
BKR LAB AP PREVIOUS ABNORMAL: NORMAL
PATH REPORT.COMMENTS IMP SPEC: NORMAL
PATH REPORT.COMMENTS IMP SPEC: NORMAL
PATH REPORT.RELEVANT HX SPEC: NORMAL

## 2022-11-17 LAB
HUMAN PAPILLOMA VIRUS 16 DNA: POSITIVE
HUMAN PAPILLOMA VIRUS 18 DNA: NEGATIVE
HUMAN PAPILLOMA VIRUS FINAL DIAGNOSIS: ABNORMAL
HUMAN PAPILLOMA VIRUS OTHER HR: NEGATIVE

## 2022-11-18 ENCOUNTER — PATIENT OUTREACH (OUTPATIENT)
Dept: PEDIATRICS | Facility: CLINIC | Age: 62
End: 2022-11-18

## 2022-11-21 NOTE — PROGRESS NOTES
INDICATIONS:                                                    Is a pregnancy test required: No.  Was a consent obtained?  Yes    Mercedes Lema, is a 62 year old female, who had a recent NIL pap.  HPV 16 positive No prior history of abnormal pap. Here today for colposcopy. Discussed indication, risks of infection and bleeding.    11/11/22 NIL Pap, + HR HPV 16 (neg 18 & other).   8/25/17 NIL pap, NEG hpv.  4/24/14 NIL pap, NEG hpv.  8/5/09 NIL pap.  6/9/09 NIL pap.  9/21/06 NIL pap.    PROCEDURE:                                                      Cervix is stained with acetic acid and viewed colposcopically. Squamocolumnar junction is visualized in it's entirety. polyp noted and removed . Endocervical curretage Done         POST PROCEDURE:                                                      IMPRESSION: Patient tolerated procedure well, colposcopy adequate, HPV and Normal cervix    PLAN : Await the results of the biopsies.  She tolerated the procedure well. There were no complications. Patient was discharged in stable condition.    Patient advised to call the clinic if excessive bleeding, pelvic pain, or fever.     Follow-up based on pathology results.  Bri Fink MD

## 2022-11-22 ENCOUNTER — OFFICE VISIT (OUTPATIENT)
Dept: OBGYN | Facility: CLINIC | Age: 62
End: 2022-11-22
Payer: COMMERCIAL

## 2022-11-22 VITALS
BODY MASS INDEX: 19.83 KG/M2 | DIASTOLIC BLOOD PRESSURE: 80 MMHG | SYSTOLIC BLOOD PRESSURE: 116 MMHG | WEIGHT: 119 LBS | HEIGHT: 65 IN

## 2022-11-22 DIAGNOSIS — R30.0 DYSURIA: ICD-10-CM

## 2022-11-22 DIAGNOSIS — R87.810 CERVICAL HIGH RISK HPV (HUMAN PAPILLOMAVIRUS) TEST POSITIVE: Primary | ICD-10-CM

## 2022-11-22 PROCEDURE — 57454 BX/CURETT OF CERVIX W/SCOPE: CPT | Performed by: OBSTETRICS & GYNECOLOGY

## 2022-11-22 PROCEDURE — 87086 URINE CULTURE/COLONY COUNT: CPT | Performed by: OBSTETRICS & GYNECOLOGY

## 2022-11-22 PROCEDURE — 88305 TISSUE EXAM BY PATHOLOGIST: CPT | Performed by: PATHOLOGY

## 2022-11-24 LAB — BACTERIA UR CULT: NORMAL

## 2022-11-27 LAB
PATH REPORT.COMMENTS IMP SPEC: NORMAL
PATH REPORT.COMMENTS IMP SPEC: NORMAL
PATH REPORT.FINAL DX SPEC: NORMAL
PATH REPORT.GROSS SPEC: NORMAL
PATH REPORT.MICROSCOPIC SPEC OTHER STN: NORMAL
PATH REPORT.RELEVANT HX SPEC: NORMAL
PHOTO IMAGE: NORMAL

## 2022-11-28 ENCOUNTER — MYC MEDICAL ADVICE (OUTPATIENT)
Dept: OBGYN | Facility: CLINIC | Age: 62
End: 2022-11-28

## 2022-12-14 ENCOUNTER — TELEPHONE (OUTPATIENT)
Dept: OBGYN | Facility: CLINIC | Age: 62
End: 2022-12-14

## 2022-12-14 ENCOUNTER — PATIENT OUTREACH (OUTPATIENT)
Dept: OBGYN | Facility: CLINIC | Age: 62
End: 2022-12-14

## 2022-12-14 DIAGNOSIS — R87.810 CERVICAL HIGH RISK HPV (HUMAN PAPILLOMAVIRUS) TEST POSITIVE: ICD-10-CM

## 2022-12-14 DIAGNOSIS — R39.89 URETHRAL PAIN: Primary | ICD-10-CM

## 2022-12-14 NOTE — TELEPHONE ENCOUNTER
Dr. Fink,    1) Pt called wondering what her urine culture results are.    2) She is still experiencing discomfort in the urethral/cervical area. She is not sure where its coming from. She would like to know what she should do next to try to resolve this issue.     Mago Espana RN  Pap Tracking

## 2022-12-14 NOTE — TELEPHONE ENCOUNTER
The urine culture from one month ago was normal.   She can see a Urologist for further work up as no infection was found.

## 2023-06-08 ENCOUNTER — PATIENT OUTREACH (OUTPATIENT)
Dept: CARE COORDINATION | Facility: CLINIC | Age: 63
End: 2023-06-08
Payer: COMMERCIAL

## 2023-06-22 ENCOUNTER — ALLIED HEALTH/NURSE VISIT (OUTPATIENT)
Dept: PEDIATRICS | Facility: CLINIC | Age: 63
End: 2023-06-22
Payer: COMMERCIAL

## 2023-06-22 VITALS — DIASTOLIC BLOOD PRESSURE: 82 MMHG | SYSTOLIC BLOOD PRESSURE: 132 MMHG

## 2023-06-22 DIAGNOSIS — Z01.30 BLOOD PRESSURE CHECK: Primary | ICD-10-CM

## 2023-06-22 PROCEDURE — 99207 PR NO CHARGE NURSE ONLY: CPT | Performed by: INTERNAL MEDICINE

## 2023-06-22 NOTE — PROGRESS NOTES
Mercedes Lema was evaluated at [facility] on June 22, 2023 at which time her blood pressure was:    BP Readings from Last 3 Encounters:   06/22/23 132/82   11/22/22 116/80   11/11/22 136/70     Pulse Readings from Last 3 Encounters:   11/11/22 85   11/09/21 87   11/06/20 60       Reviewed lifestyle modifications for blood pressure control and reduction: including making healthy food choices, managing weight, getting regular exercise, smoking cessation, reducing alcohol consumption, monitoring blood pressure regularly.     Symptoms: Dizziness occasionally and edema in ankles sometimes    BP Goal:< 140/90 mmHg    BP Assessment:  BP at goal    Potential Reasons for BP too high: Anxiety - Took pt BP twice while in office with our manual BP cuff 1. 138/82 2. 132/82 Took once with patient's at home BP monitor 136/93. Patient states she thinks the beep of her machine gives her anxiety and causes it to be higher at home. Patient still taking Amlodipine 5mg daily. She has experienced some dizziness and edema in her ankles, but not every day. 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 Let pt. know I would pass this information along to provider.     BP Follow-Up Plan: Recheck BP in 6 months at pharmacy    Recommendation to Provider: Routing message to PCP for review because BP checked at pharmacy is above goal with her home monitor (she states usually 140/90) and also having swelling of ankles and occasional dizziness. Recommended patient to follow-up with PCP.  PCP please close this encounter.    Note completed by: Thank you,     Cherrie Madrid, PharmD  [facility]

## 2023-06-23 NOTE — PROGRESS NOTES
Call placed to pt with message from provider  LM to return call to the clinic    Eddie Sommers RN on 6/23/2023 at 8:56 AM

## 2023-06-26 NOTE — PROGRESS NOTES
Attempted to reach patient, [facility].     Omar DELEON RN 6/26/2023 at 4:20 PM

## 2023-06-27 NOTE — PROGRESS NOTES
"Called and spoke to patient.   EDEMA  She states that the Edema comes and goes. She was currently driving and doesn't think she has edema currently. Patient states she wears compression stocking when she knows she is going to be on her feet for an extended period of time. She said she noticed the edema when she is standing for long periods of time. Denies edema in the morning. She is unsure when it started and does not happen every day. Discussed continuing compression stockings as well as elevating legs. Patient will advise care team if she starts to notice edema at other times of day, such as when she wakes up in the morning.    Lightheadedness happens a lot. Patient states she isn't dizzy but just a 'little lightheaded'.  Wondering if it is because of air quality. Patient in unsure when lightheadedness started. She denies vision changes, headache, falling, or feeling like she is going to faint. Patient states she has discussed with PCP at previous visit as this has been ongoing.    Believes \"there is a family history of this kind of stuff\"    Patient states she stays hydrated and that she \"loves water\". Patient also states she eats regularly so does not believe it is related to dehydration or low caloric intake. Patient did state that she does have some anxiety and unsure if it is related to this.    Patient unsure if there is a correlation of the amlodipine and the edema/lightheadedness.    Per chart review, lightheadedness was discussed at 11/09/21 physical:  cardiac concerns - has noticed times that has palpitations - couple of times a week and lasts about 30 seconds.  Some shooting, fleeting, sharp pains that make her stop what she is doing and are independent.  Occasionally gets a little short of breath.  Has had times of feeling lightheaded, not dizzy.  No loc.  No pre-syncope.   * heart monitor ordered at that time and patient did not complete.     Advised patient to call care team if edema and/or " lightheadedness becomes worse. Or if symptoms change.     Patient aware she is due for physical in November. Patient was driving so will call back or schedule via [facility].  Pavithra OCHOA RN, BSN

## 2023-07-10 ENCOUNTER — ANCILLARY PROCEDURE (OUTPATIENT)
Dept: MAMMOGRAPHY | Facility: CLINIC | Age: 63
End: 2023-07-10
Attending: INTERNAL MEDICINE
Payer: COMMERCIAL

## 2023-07-10 DIAGNOSIS — Z12.31 ENCOUNTER FOR SCREENING MAMMOGRAM FOR MALIGNANT NEOPLASM OF BREAST: ICD-10-CM

## 2023-07-10 PROCEDURE — 77067 SCR MAMMO BI INCL CAD: CPT | Mod: TC | Performed by: RADIOLOGY

## 2023-07-10 PROCEDURE — 77063 BREAST TOMOSYNTHESIS BI: CPT | Mod: TC | Performed by: RADIOLOGY

## 2023-11-06 PROBLEM — R87.810 CERVICAL HIGH RISK HPV (HUMAN PAPILLOMAVIRUS) TEST POSITIVE: Status: ACTIVE | Noted: 2022-11-18

## 2023-11-14 ENCOUNTER — LAB (OUTPATIENT)
Dept: LAB | Facility: CLINIC | Age: 63
End: 2023-11-14
Payer: COMMERCIAL

## 2023-11-14 DIAGNOSIS — Z11.3 SCREEN FOR STD (SEXUALLY TRANSMITTED DISEASE): ICD-10-CM

## 2023-11-14 DIAGNOSIS — I10 BENIGN ESSENTIAL HYPERTENSION: Primary | ICD-10-CM

## 2023-11-14 LAB
ANION GAP SERPL CALCULATED.3IONS-SCNC: 9 MMOL/L (ref 7–15)
BUN SERPL-MCNC: 13.8 MG/DL (ref 8–23)
CALCIUM SERPL-MCNC: 9.5 MG/DL (ref 8.8–10.2)
CHLORIDE SERPL-SCNC: 102 MMOL/L (ref 98–107)
CHOLEST SERPL-MCNC: 177 MG/DL
CREAT SERPL-MCNC: 0.91 MG/DL (ref 0.51–0.95)
DEPRECATED HCO3 PLAS-SCNC: 27 MMOL/L (ref 22–29)
EGFRCR SERPLBLD CKD-EPI 2021: 71 ML/MIN/1.73M2
GLUCOSE SERPL-MCNC: 97 MG/DL (ref 70–99)
HDLC SERPL-MCNC: 69 MG/DL
LDLC SERPL CALC-MCNC: 99 MG/DL
NONHDLC SERPL-MCNC: 108 MG/DL
POTASSIUM SERPL-SCNC: 4.4 MMOL/L (ref 3.4–5.3)
SODIUM SERPL-SCNC: 138 MMOL/L (ref 135–145)
TRIGL SERPL-MCNC: 46 MG/DL

## 2023-11-14 PROCEDURE — 80061 LIPID PANEL: CPT

## 2023-11-14 PROCEDURE — 36415 COLL VENOUS BLD VENIPUNCTURE: CPT

## 2023-11-14 PROCEDURE — 80048 BASIC METABOLIC PNL TOTAL CA: CPT

## 2023-11-14 PROCEDURE — 87389 HIV-1 AG W/HIV-1&-2 AB AG IA: CPT

## 2023-11-17 ENCOUNTER — OFFICE VISIT (OUTPATIENT)
Dept: PEDIATRICS | Facility: CLINIC | Age: 63
End: 2023-11-17
Payer: COMMERCIAL

## 2023-11-17 VITALS
OXYGEN SATURATION: 98 % | SYSTOLIC BLOOD PRESSURE: 130 MMHG | HEART RATE: 88 BPM | TEMPERATURE: 97.4 F | DIASTOLIC BLOOD PRESSURE: 80 MMHG | WEIGHT: 118.7 LBS | HEIGHT: 65 IN | BODY MASS INDEX: 19.78 KG/M2

## 2023-11-17 DIAGNOSIS — I10 BENIGN ESSENTIAL HYPERTENSION: ICD-10-CM

## 2023-11-17 DIAGNOSIS — M81.0 AGE-RELATED OSTEOPOROSIS WITHOUT CURRENT PATHOLOGICAL FRACTURE: ICD-10-CM

## 2023-11-17 DIAGNOSIS — Z11.3 SCREEN FOR STD (SEXUALLY TRANSMITTED DISEASE): ICD-10-CM

## 2023-11-17 DIAGNOSIS — N95.2 ATROPHIC VAGINITIS: ICD-10-CM

## 2023-11-17 DIAGNOSIS — R68.89 THROAT SYMPTOM: ICD-10-CM

## 2023-11-17 DIAGNOSIS — Z00.00 ROUTINE GENERAL MEDICAL EXAMINATION AT A HEALTH CARE FACILITY: Primary | ICD-10-CM

## 2023-11-17 DIAGNOSIS — F43.22 ADJUSTMENT DISORDER WITH ANXIOUS MOOD: ICD-10-CM

## 2023-11-17 DIAGNOSIS — F40.243 ANXIETY WITH FLYING: ICD-10-CM

## 2023-11-17 DIAGNOSIS — R87.810 CERVICAL HIGH RISK HPV (HUMAN PAPILLOMAVIRUS) TEST POSITIVE: ICD-10-CM

## 2023-11-17 DIAGNOSIS — M85.89 OSTEOPENIA OF MULTIPLE SITES: ICD-10-CM

## 2023-11-17 DIAGNOSIS — Z12.4 CERVICAL CANCER SCREENING: ICD-10-CM

## 2023-11-17 DIAGNOSIS — R30.0 DYSURIA: ICD-10-CM

## 2023-11-17 LAB
ALBUMIN UR-MCNC: NEGATIVE MG/DL
APPEARANCE UR: CLEAR
BACTERIA #/AREA URNS HPF: ABNORMAL /HPF
BILIRUB UR QL STRIP: NEGATIVE
CLUE CELLS: ABNORMAL
COLOR UR AUTO: YELLOW
GLUCOSE UR STRIP-MCNC: NEGATIVE MG/DL
HGB UR QL STRIP: ABNORMAL
KETONES UR STRIP-MCNC: NEGATIVE MG/DL
LEUKOCYTE ESTERASE UR QL STRIP: NEGATIVE
MUCOUS THREADS #/AREA URNS LPF: PRESENT /LPF
NITRATE UR QL: NEGATIVE
PH UR STRIP: 6 [PH] (ref 5–7)
RBC #/AREA URNS AUTO: ABNORMAL /HPF
SP GR UR STRIP: <=1.005 (ref 1–1.03)
SQUAMOUS #/AREA URNS AUTO: ABNORMAL /LPF
TRICHOMONAS, WET PREP: ABNORMAL
UROBILINOGEN UR STRIP-ACNC: 0.2 E.U./DL
WBC #/AREA URNS AUTO: ABNORMAL /HPF
WBC'S/HIGH POWER FIELD, WET PREP: ABNORMAL
YEAST, WET PREP: ABNORMAL

## 2023-11-17 PROCEDURE — 87591 N.GONORRHOEAE DNA AMP PROB: CPT | Performed by: INTERNAL MEDICINE

## 2023-11-17 PROCEDURE — 87210 SMEAR WET MOUNT SALINE/INK: CPT | Performed by: INTERNAL MEDICINE

## 2023-11-17 PROCEDURE — G0123 SCREEN CERV/VAG THIN LAYER: HCPCS | Performed by: INTERNAL MEDICINE

## 2023-11-17 PROCEDURE — 81001 URINALYSIS AUTO W/SCOPE: CPT | Performed by: INTERNAL MEDICINE

## 2023-11-17 PROCEDURE — 87491 CHLMYD TRACH DNA AMP PROBE: CPT | Performed by: INTERNAL MEDICINE

## 2023-11-17 PROCEDURE — 99396 PREV VISIT EST AGE 40-64: CPT | Performed by: INTERNAL MEDICINE

## 2023-11-17 PROCEDURE — 87624 HPV HI-RISK TYP POOLED RSLT: CPT | Performed by: INTERNAL MEDICINE

## 2023-11-17 PROCEDURE — 99213 OFFICE O/P EST LOW 20 MIN: CPT | Mod: 25 | Performed by: INTERNAL MEDICINE

## 2023-11-17 RX ORDER — AMLODIPINE BESYLATE 5 MG/1
5 TABLET ORAL DAILY
Qty: 90 TABLET | Refills: 3 | Status: SHIPPED | OUTPATIENT
Start: 2023-11-17

## 2023-11-17 RX ORDER — LORAZEPAM 0.5 MG/1
0.5 TABLET ORAL EVERY 6 HOURS PRN
Qty: 10 TABLET | Refills: 0 | Status: SHIPPED | OUTPATIENT
Start: 2023-11-17

## 2023-11-17 ASSESSMENT — ENCOUNTER SYMPTOMS: BREAST MASS: 0

## 2023-11-17 NOTE — PATIENT INSTRUCTIONS
There are a lot of apps that have some data for their mental health benefits.  Some of these include:  -Unwinding Anxiety has a cognitive behavioral approach, which is a type of therapy.    -MindShift for anxiety  -iBreathe to help with stress management  -Headspace for meditation.    -MindFit that is more generally about mental health.     The Chemistry of Calm is also a book that can be worked through to help.      Preventive Health Recommendations  Female Ages 50 - 64    Yearly exam: See your health care provider every year in order to  Review health changes.   Discuss preventive care.    Review your medicines if your doctor has prescribed any.    Get a Pap test every three years (unless you have an abnormal result and your provider advises testing more often).  If you get Pap tests with HPV test, you only need to test every 5 years, unless you have an abnormal result.   You do not need a Pap test if your uterus was removed (hysterectomy) and you have not had cancer.  You should be tested each year for STDs (sexually transmitted diseases) if you're at risk.   Have a mammogram every 1 to 2 years.  Have a colonoscopy at age 45, or have a yearly FIT test (stool test). These exams screen for colon cancer.    Have a cholesterol test every 5 years, or more often if advised.  Have a diabetes test (fasting glucose) every three years. If you are at risk for diabetes, you should have this test more often.   If you are at risk for osteoporosis (brittle bone disease), think about having a bone density scan (DEXA).    Shots: Get a flu shot each year. Get a tetanus shot every 10 years - you are due April 24, 2024.  You can schedule at the pharmacy - [email or web address].    Nutrition:   Eat at least 5 servings of fruits and vegetables each day.  Eat whole-grain bread, whole-wheat pasta and brown rice instead of white grains and rice.  Get adequate Calcium and Vitamin D.     Lifestyle  Exercise at least 150 minutes a week (30  minutes a day, 5 days a week). This will help you control your weight and prevent disease.  Limit alcohol to one drink per day.  No smoking.   Wear sunscreen to prevent skin cancer.   See your dentist every six months for an exam and cleaning.  See your eye doctor every 1 to 2 years.

## 2023-11-17 NOTE — PROGRESS NOTES
SUBJECTIVE:   Mercedes is a 63 year old, presenting for the following:  Physical        2023     2:23 PM   Additional Questions   Roomed by mis   Accompanied by self         2023     2:23 PM   Patient Reported Additional Medications   Patient reports taking the following new medications no       Healthy Habits:     Getting at least 3 servings of Calcium per day:  Yes    Bi-annual eye exam:  Yes    Dental care twice a year:  Yes    Sleep apnea or symptoms of sleep apnea:  None    Diet:  Regular (no restrictions)    Frequency of exercise:  2-3 days/week    Duration of exercise:  15-30 minutes    Taking medications regularly:  Yes    Additional concerns today:  Yes  HPV on pap last year  Dark spots  Dysuria, urethral erythema  Throat issues - feels like constricted all the time.  Since August.  No food sticking.  Voice hasn't changed.    Social History     Tobacco Use    Smoking status: Never    Smokeless tobacco: Never   Substance Use Topics    Alcohol use: Yes     Comment: Glass of wine/beer 3 days a week             2023     2:16 PM   Alcohol Use   Prescreen: >3 drinks/day or >7 drinks/week? No     Reviewed orders with patient.  Reviewed health maintenance and updated orders accordingly - Yes  Labs reviewed in EPIC    Breast Cancer Screenin/9/2021     1:55 PM   Breast CA Risk Assessment (FHS-7)   Do you have a family history of breast, colon, or ovarian cancer? No / Unknown       Mammogram Screening - Alternate mammogram schedule due to breast cancer history  Pertinent mammograms are reviewed under the imaging tab.    History of abnormal Pap smear: YES - updated in Problem List and Health Maintenance accordingly      Latest Ref Rng & Units 2022     2:27 PM 2017     2:27 PM 2017     9:00 AM   PAP / HPV   PAP  Negative for Intraepithelial Lesion or Malignancy (NILM)      PAP (Historical)   NIL     HPV 16 DNA Negative Positive   Negative    HPV 18 DNA Negative Negative    "Negative    Other HR HPV Negative Negative   Negative      Reviewed and updated as needed this visit by clinical staff   Tobacco  Allergies  Meds  Problems             Reviewed and updated as needed this visit by Provider     Meds  Problems                Review of Systems   Breasts:  Negative for tenderness, breast mass and discharge.          OBJECTIVE:   /80 (BP Location: Right arm, Patient Position: Sitting, Cuff Size: Adult Regular)   Pulse 88   Temp 97.4  F (36.3  C) (Temporal)   Ht 1.645 m (5' 4.76\")   Wt 53.8 kg (118 lb 11.2 oz)   LMP 02/08/2013   SpO2 98%   BMI 19.90 kg/m    Physical Exam  GENERAL: healthy, alert and no distress  EYES: Eyes grossly normal to inspection, PERRL and conjunctivae and sclerae normal  HENT: ear canals and TM's normal, nose and mouth without ulcers or lesions  NECK: no adenopathy, no asymmetry, masses, or scars and thyroid normal to palpation  RESP: lungs clear to auscultation - no rales, rhonchi or wheezes  CV: regular rate and rhythm, normal S1 S2, no S3 or S4, no murmur, click or rub, no peripheral edema and peripheral pulses strong  ABDOMEN: soft, nontender, no hepatosplenomegaly, no masses and bowel sounds normal   (female): normal female external genitalia, normal urethral meatus , vaginal mucosal atrophy, and normal cervix  MS: no gross musculoskeletal defects noted, no edema  SKIN: no suspicious lesions or rashes  NEURO: Normal strength and tone, mentation intact and speech normal  PSYCH: mentation appears normal, affect normal/bright    Diagnostic Test Results:  Labs reviewed in Epic    ASSESSMENT/PLAN:   Routine general medical examination at a health care facility  Routine health education discussed: calcium, diet, exercise, weight, safety.     Osteopenia of multiple sites  Recheck dexa  - DEXA HIP/PELVIS/SPINE - Future; Future    Adjustment disorder with anxious mood  Discussed stress and coping.  Declines therapy at this point.  Discussed " exercise, meditation, apps to help.  Notify if worsening and wants therapy referral    Anxiety with flying  Pdmp reviewed, refill  - LORazepam (ATIVAN) 0.5 MG tablet; Take 1 tablet (0.5 mg) by mouth every 6 hours as needed for anxiety for anxiety    Benign essential hypertension  Controlled, refill  - amLODIPine (NORVASC) 5 MG tablet; Take 1 tablet (5 mg) by mouth daily  - UA with Microscopic reflex to Culture - lab collect; Future  - Adult ENT  Referral; Future  - UA with Microscopic reflex to Culture - lab collect  - UA Microscopic with Reflex to Culture    Dysuria  Check labs.  Discussed could be atrophic vaginitis but doesn't want to start estrogen topical at this time.  Follow-up with urology as scheduled.    - NEISSERIA GONORRHOEA PCR; Future  - CHLAMYDIA TRACHOMATIS PCR; Future  - UA with Microscopic reflex to Culture - lab collect; Future  - Wet prep - lab collect; Future  - NEISSERIA GONORRHOEA PCR  - CHLAMYDIA TRACHOMATIS PCR  - UA with Microscopic reflex to Culture - lab collect  - Wet prep - lab collect  - UA Microscopic with Reflex to Culture    Throat symptom  Refer to ENT for eval  - Adult ENT  Referral; Future    Atrophic vaginitis  Discussed estrogen topically but declines    Cervical high risk HPV (human papillomavirus) test positive  Check pap, discussed that we can monitor closely and treat any issues that develop    Screen for STD (sexually transmitted disease)  Given HPV positive, check for other potential infections  - NEISSERIA GONORRHOEA PCR; Future  - CHLAMYDIA TRACHOMATIS PCR; Future  - HIV Antigen Antibody Combo; Future  - UA with Microscopic reflex to Culture - lab collect; Future  - Wet prep - lab collect; Future  - NEISSERIA GONORRHOEA PCR  - CHLAMYDIA TRACHOMATIS PCR  - UA with Microscopic reflex to Culture - lab collect  - Wet prep - lab collect  - UA Microscopic with Reflex to Culture    Cervical cancer screening    - Pap Screen with HPV - recommended age 30 - 65  years        Addendum:  DEXA with osteoporosis.  Fosmax prescription sent and mychart result message to patient.      COUNSELING:  Reviewed preventive health counseling, as reflected in patient instructions        She reports that she has never smoked. She has never used smokeless tobacco.          Tabitha Quintero MD  [facility]

## 2023-11-18 LAB
C TRACH DNA SPEC QL NAA+PROBE: NEGATIVE
HIV 1+2 AB+HIV1 P24 AG SERPL QL IA: NONREACTIVE
N GONORRHOEA DNA SPEC QL NAA+PROBE: NEGATIVE

## 2023-11-22 LAB
BKR LAB AP GYN ADEQUACY: NORMAL
BKR LAB AP GYN INTERPRETATION: NORMAL
BKR LAB AP HPV REFLEX: NORMAL
BKR LAB AP PREVIOUS ABNL DX: NORMAL
BKR LAB AP PREVIOUS ABNORMAL: NORMAL
PATH REPORT.COMMENTS IMP SPEC: NORMAL
PATH REPORT.COMMENTS IMP SPEC: NORMAL
PATH REPORT.RELEVANT HX SPEC: NORMAL

## 2023-11-25 LAB
HUMAN PAPILLOMA VIRUS 16 DNA: NEGATIVE
HUMAN PAPILLOMA VIRUS 18 DNA: NEGATIVE
HUMAN PAPILLOMA VIRUS FINAL DIAGNOSIS: NORMAL
HUMAN PAPILLOMA VIRUS OTHER HR: NEGATIVE

## 2023-11-29 ENCOUNTER — HOSPITAL ENCOUNTER (OUTPATIENT)
Dept: BONE DENSITY | Facility: CLINIC | Age: 63
Discharge: HOME | End: 2023-11-29
Attending: INTERNAL MEDICINE | Admitting: INTERNAL MEDICINE
Payer: COMMERCIAL

## 2023-11-29 DIAGNOSIS — M85.89 OSTEOPENIA OF MULTIPLE SITES: ICD-10-CM

## 2023-11-29 PROBLEM — M81.0 AGE-RELATED OSTEOPOROSIS WITHOUT CURRENT PATHOLOGICAL FRACTURE: Status: ACTIVE | Noted: 2020-12-02

## 2023-11-29 PROCEDURE — 77080 DXA BONE DENSITY AXIAL: CPT

## 2023-11-29 RX ORDER — RISEDRONATE SODIUM 35 MG/1
35 TABLET, FILM COATED ORAL
Qty: 12 TABLET | Refills: 3 | Status: SHIPPED | OUTPATIENT
Start: 2023-11-29

## 2023-11-30 ENCOUNTER — TELEPHONE (OUTPATIENT)
Dept: PEDIATRICS | Facility: CLINIC | Age: 63
End: 2023-11-30
Payer: COMMERCIAL

## 2023-11-30 NOTE — TELEPHONE ENCOUNTER
"Called and relayed provider message below. Patient verbalized understanding and plans to discuss at her upcoming ENT appointment. Patient will  script if ENT gives her \"the green light\".     Omar DELEON RN 11/30/2023 at 1:39 PM    "

## 2023-11-30 NOTE — TELEPHONE ENCOUNTER
Received call from patient, has question regarding RISEdronate (ACTONEL) 35 MG tablet. Patient notes side effects listed for this medication include esophageal issues. Patient states she is seeing ENT in mid-December regarding throat and swallowing concerns. Patient is wondering if she should hold off on starting actonel until after she sees ENT due to possible side effects. Please review and advise.     Omar DELEON RN 11/30/2023 at 12:03 PM

## 2023-11-30 NOTE — TELEPHONE ENCOUNTER
Yes, should wait until after seeing ENT.  If ENT has concerns, there is an IV form of the medication that is given every other year as another option.    Tabitha Quintero MD

## 2023-12-08 ENCOUNTER — OFFICE VISIT (OUTPATIENT)
Dept: UROLOGY | Facility: CLINIC | Age: 63
End: 2023-12-08
Payer: COMMERCIAL

## 2023-12-08 VITALS
HEIGHT: 64 IN | SYSTOLIC BLOOD PRESSURE: 134 MMHG | OXYGEN SATURATION: 98 % | BODY MASS INDEX: 20.14 KG/M2 | WEIGHT: 118 LBS | DIASTOLIC BLOOD PRESSURE: 84 MMHG | HEART RATE: 98 BPM

## 2023-12-08 DIAGNOSIS — Z87.898 HISTORY OF GROSS HEMATURIA: ICD-10-CM

## 2023-12-08 DIAGNOSIS — R39.89 URETHRAL PAIN: Primary | ICD-10-CM

## 2023-12-08 DIAGNOSIS — R30.0 DYSURIA: ICD-10-CM

## 2023-12-08 DIAGNOSIS — N95.2 VAGINAL ATROPHY: ICD-10-CM

## 2023-12-08 DIAGNOSIS — N94.10 DYSPAREUNIA IN FEMALE: ICD-10-CM

## 2023-12-08 DIAGNOSIS — M62.89 PELVIC FLOOR DYSFUNCTION: ICD-10-CM

## 2023-12-08 LAB
ALBUMIN UR-MCNC: NEGATIVE MG/DL
APPEARANCE UR: CLEAR
BILIRUB UR QL STRIP: NEGATIVE
COLOR UR AUTO: YELLOW
GLUCOSE UR STRIP-MCNC: NEGATIVE MG/DL
HGB UR QL STRIP: ABNORMAL
KETONES UR STRIP-MCNC: NEGATIVE MG/DL
LEUKOCYTE ESTERASE UR QL STRIP: NEGATIVE
MUCOUS THREADS #/AREA URNS LPF: PRESENT /LPF
NITRATE UR QL: NEGATIVE
PH UR STRIP: 6 [PH] (ref 5–7)
RBC URINE: 1 /HPF
RESIDUAL VOLUME (RV) (EXTERNAL): 27
SP GR UR STRIP: <=1.005 (ref 1–1.03)
UROBILINOGEN UR STRIP-ACNC: 0.2 E.U./DL
WBC URINE: <1 /HPF

## 2023-12-08 PROCEDURE — 51798 US URINE CAPACITY MEASURE: CPT | Performed by: PHYSICIAN ASSISTANT

## 2023-12-08 PROCEDURE — 88112 CYTOPATH CELL ENHANCE TECH: CPT | Performed by: PATHOLOGY

## 2023-12-08 PROCEDURE — 81001 URINALYSIS AUTO W/SCOPE: CPT | Performed by: PHYSICIAN ASSISTANT

## 2023-12-08 PROCEDURE — 99205 OFFICE O/P NEW HI 60 MIN: CPT | Mod: 25 | Performed by: PHYSICIAN ASSISTANT

## 2023-12-08 ASSESSMENT — PAIN SCALES - GENERAL: PAINLEVEL: MODERATE PAIN (4)

## 2023-12-08 NOTE — NURSING NOTE
Chief Complaint   Patient presents with    urethra pain    Patients post void residual was 27 ml.  Mago Lai LPN

## 2023-12-08 NOTE — PATIENT INSTRUCTIONS
I placed a referral for pelvic floor physical therapy, they will call you to schedule this appointment. You can always cancel if you do not want to proceed with PT.     Locations for Pelvic Floor Physical Therapy:  [facility] - [facility] - [facility] - [facility]   [facility])   [facility] - [facility] - [facility]:   [facility] [place] [place] [facility] - [facility] - [facility])   [facility] - [facility]    _________________________________________________     Start compounded estrogen cream 3x/week at night.     ___________________________________________      Dietary recommendations:    Foods/Beverages to Avoid:  caffeinated beverages, carbonated beverages, coffee, decaffeinated coffee, tea, caffeine free tea, soda, alcoholic beverages, grapefruit, lemon, oranges, pineapple, cranberry juice, grapefruit juice, orange juice, pineapple juice, tomato or tomato based products, red dyed products, spicy foods, chili, horseradish, vinegar, MSG, artificial sweeteners, Mexican food, Tajik food, Kosovan food, sugar, strawberries, cranberries, chocolate, mocha, key lime, salsa, spicy/hot chips/crackers, chili pepper flakes, hot/spicey wing sauces, jalapeno, citric acid, citrus fruits,  soy, oil & vinegar salad dressings, Vitamin C & B6.    Foods/beverages that are least irritating:  water, milk, bananas, blueberries, honeydew melon, peers, raisins, watermelon, broccoli, [place] sprouts, cabbage, carrots, cauliflower, celery, cucumber, mushrooms, peas, radishes, squash, zucchini, white potatoes, sweet potatoes/yams, chicken, eggs, turkey, beef, pork, lamb, shrimp, tunafish, salmon, oat, rice, pretzels, popcorn, applesauce, apricots, artichokes, asparagus, avocado, basil, beans, fresh beats, bell peppers, nonprocessed or overly spiced cheeses, plain chips, corn, oatmeal, cottage cheese, garlic, basil, dill, mushrooms, creamers without soybean oil, nuts, oils, black olives, oregano, parsley, non-chocolate or fruit pastries, non-soy protein powders, pumpkin, quinoa, radish, fresh rhubarb, rice, rosemary, haley, ranch dressing, table salt, non-chocolate puddings, honey, thyme, corn or flour tortillas, vanilla, Vitamins (A, B1, B2, B12, D, E, K), flour, cool whip.     ___________________________________________________________       Instant Cheshire Bath:  Over the counter Instant Ocean baths can be used for chronic bladder pain, urethral pain, vulvar pain and pelvic pain. Fill your clean tub half-full with warm water and add 2 cups of instant ocean salts. Allow the salt to dissolve. Soak for about 10 minutes (if tolerated). After bathing, rinse off body with fresh water and gently pat dry with a towel. You can do instant ocean baths 2-3 times per day or as needed.    If unable to obtain Instant Cheshire Bath, can also try a bath using over the counter epsom salt.   __________________________________________________     Can try AZO over the counter to see if helps with symptoms. This turns your urine orange and is normal. Take as needed. Do not recommend daily use.     _________________________________________    Avoid fluids 3 hours before bed. Sips of water with medications is fine.      ____________________________________________    Start daily fiber supplement for bowels.    _____________________________________________    - CT scan: Please call [phone] to schedule this at the [facility] ([place]) or [facility] ([place]).   - Your urine cytology is pending.   - Please follow-up for cystoscopy with next available urologist. Please make sure you have your urine cytology and CT scan done prior to you cystoscopy appointment.    CYSTOSCOPY    What is a Cystoscopy?  This is a procedure done to check for problems inside the bladder.  Problems may include polyps (growths), tumors, inflammation (swelling and redness) and other concerns.    The doctor inserts a thin tube (called a cystoscope) into the bladder.  The tube is about the size of a pencil.  We will give you numbing medicine to reduce the pain or discomfort you may feel.    The tube allows the doctor to:  The doctor will be able to see inside the bladder by filling the bladder with water.  The water makes it easier to see any problems that may be present.    If needed, the doctor may use the tube to:  The doctor is able to take tissue samples (biopsies).  Samples are sent to the lab for testing.  The doctor can also burn off any small growths or tumors that are found.  This is call fulguration.    What happens after the exam?  You may go back to your normal diet and activity as you feel ready, unless your doctor tells you not to.    For the next two days, you may notice:  Some blood in your urine.  Some burning when you urinate (use the toilet).  An urge to urinate more often.  Bladder spasms.    These are normal after the procedure. They should go away on their own after a day or two.      You can help to relieve the above listed symptoms by:  Drinking 6 to 8 large glasses of water each day (includes drinks at meals).  This will help clear the urine.  Take warm baths to relieve pain and  bladder spasms.  Do not add anything to the bath water.  Your doctor may prescribe pain medicine.  You may also take Tylenol (acetaminophen) for pain.    When should I call my doctor?  A fever over 100.0 F (38 C) for more than a day.  (Before you call the doctor, check your temperature under your tongue.)  Chills.  Failure to urinate: No urine comes out when you try to use the toilet.  (Try soaking in a bathtub full of warm water.  If still no urine, call your doctor.)  A lot of blood in the urine or blood clots larger than a nickel.  Pain in the back or abdomen (belly / stomach area).  Pain or spasms that are not relieved by warm tub baths and pain medicine.  Severe pain, burning or other problems while passing urine.  Pain that gets worse after two days.

## 2023-12-08 NOTE — LETTER
12/8/2023       RE: Mercedes Lema  [address] [place] [address]     Dear Colleague,    Thank you for referring your patient, Mercedes Lema, to the [facility] at [facility]. Please see a copy of my visit note below.    Urology Clinic    Name: Mercedes Lema    MRN: [id number]   YOB: 1960  Accompanied at today's visit by:self              Assessment and Plan:   63 year old female with urethral pain, dyspareunia, nocturia, vaginal atrophy, pelvic floor dysfunction, constipation.    - PVR WNL.   - UA shows hematuria; will send for micro.   - Discussed AUA guidelines and gross hematuria workup. Patient would like to undergo hematuria workup. CT urogram ordered, urine cytology today and pending. Patient requesting female provider, will have her followup with Dr. Angeles for cysto. Advised to have CT done prior to cysto.   - Patient unsure if wants to move forward with PFPT. referral to PFPT placed today and can cancel if changes her mind.   - avoid bladder irritants; IC diet handout given.  - avoid fluids before bed.  - Can try sitz baths to see if helps with discomfort.  - Can try AZO prn to see if helps with symptoms.   - encourage resuming estrogen cream; order to compounding pharmacy sent today.   - Noted constipation on exam. Recommend daily fiber supplement.     Orders Placed This Encounter   Procedures    MEASURE POST-VOID RESIDUAL URINE/BLADDER CAPACITY, US NON-IMAGING (54778)    UA without Microscopic [[id number]]     After discussing the assessment and plan with patient, patient verbalized understanding and agreed to the above plan. All questions answered.     60 minutes were spent today on the date of the encounter in reviewing the EMR, direct patient care, coordination of care and documentation in addition to exam, ordering medication, referral to PFPT, ordering CT scan, reviewing oncology notes from 2009.  "    Harmony Márquez PA-C  December 8, 2023    Patient Care Team:  Tabitha Quintero MD as PCP - General (Internal Medicine)  Tabitha Quintero MD as Assigned PCP  Bri Fink MD as Assigned OBGYN Provider  Harmony Márquez PA-C as Physician Assistant            Chief Complaint:   Urethral pain          History of Present Illness:   December 8, 2023    HISTORY: Mercedes Lema is a 63 year old female as a new consultation for concerns of dysuria and urethral pain. Saw PCP for annual exam on 11/17/23 and reported dysruia. PCP concerned secondary to vaginal atrophy so was started on estrogen cream and referred to our team. Reports over the past year she has noticed urethra discomfort described as feeling \"inflamed\". States the discomfort is constant that waxes and wanes. Unclear if anything specifically triggers hers symptoms to worsen. Denies urinary urgency/frequency during the day. Does report nocturia (3x at night), but associates this with drinking a lot of water before bed. Denies urinary incontinence. Drinks about 1 cup of coffee per day. Denies hx of kidney stones. Does report an episode of gross hematuria shortly after having intercourse about 4 years ago. States they did not find anything on exam that suggested a vaginal source at that time. Denies any further gross hematuria since that time. Denies Luis. 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 Sexually active and reports pain with intercourse with thrusting and penetration. Does state her urethral discomfort worsens after intercourse. Denies hx of abuse. Has not started using estrogen cream. States she was on estrogen cream about 4 years ago but cost $400. Reports regular BMs. Of note, on 11/11/22 had NIL pap with positive HPV. Is s/p colposcopy on 11/22/22 with biopsies showing endocervical polyp and benign endocervical epithelium. Had repeat exam/pap on 11/17/23 and was NIL with negative HPV. PMH is significant for " osteopenia, adjustment disorder with anxious mood, HTN. PSH includes supracervical hysterectomy with BSO and breast biopsies.  Denies history of smoking.. Patient voices no other concerns at this time.            Past Medical History:     Past Medical History:   Diagnosis Date    Allergic rhinitis, cause unspecified     Anxiety state, unspecified     fear of flying (Ativan)    Breast cancer (H) 8/7/2009    Right Breast Cancer    Hypertension     Hyponatremia 1/2/2018            Past Surgical History:     Past Surgical History:   Procedure Laterality Date    BIOPSY  8/7/2009    Right Breast Needle Biopsy    BIOPSY  8/20/2009    MRI Guided Right Breast Biopsy    BIOPSY  8/21/2009    Left Breast US Guided Biopsy    BREAST SURGERY  9/8/2009    Right/Left Lumpectomies    LAPAROSCOPIC HYSTERECTOMY SUPRACERVICAL, BILATERAL SALPINGO-OOPHORECTOMY, COMBINED  2/20/2013    Procedure: COMBINED LAPAROSCOPIC HYSTERECTOMY SUPRACERVICAL, SALPINGO-OOPHORECTOMY;  LAPAROSCOPIC HYSTERECTOMY SUPRACERVICAL, SALPINGO-OOPHORECTOMY (MORCELLATOR, TRIP LOOP)   ;  Surgeon: Suzy Soni MD;  Location: [facility]            Social History:     Social History     Tobacco Use    Smoking status: Never    Smokeless tobacco: Never   Substance Use Topics    Alcohol use: Yes     Comment: Glass of wine/beer 3 days a week            Family History:     Family History   Problem Relation Age of Onset    Cancer Mother         Cervical    Hypertension Mother     Family History Negative Father     Breast Cancer Other         cousin    Cerebrovascular Disease Maternal Grandmother 68              Allergies:     Allergies   Allergen Reactions    Amoxicillin-Pot Clavulanate GI Disturbance    Hctz [Hydrochlorothiazide]     Tequin GI Disturbance            Medications:     Current Outpatient Medications   Medication Sig    Acetaminophen (TYLENOL PO) Take 325-650 mg by mouth every 6 hours as needed for mild pain or fever    amLODIPine (NORVASC) 5 MG  tablet Take 1 tablet (5 mg) by mouth daily    CALCIUM 600 + D OR daily (Patient not taking: Reported on 11/17/2023)    cyclobenzaprine (FLEXERIL) 5 MG tablet 1-2 po tid prn    FEXOFENADINE HCL PO Take 180 mg by mouth daily as needed for allergies    LORazepam (ATIVAN) 0.5 MG tablet Take 1 tablet (0.5 mg) by mouth every 6 hours as needed for anxiety for anxiety    RISEdronate (ACTONEL) 35 MG tablet Take 1 tablet (35 mg) by mouth every 7 days     No current facility-administered medications for this visit.             Review of Systems:    ROS: 14 point ROS neg other than the symptoms noted above in the HPI.          Physical Exam:   Last menstrual period 02/08/2013.  Data Unavailable, There is no height or weight on file to calculate BMI., 0 lbs 0 oz  Gen appearance: Age-appropriate appearing female in NAD.   HEENT:  EOMI, conjunctiva clear/white. Normal ROM of neck for age.   Psych:  alert , In no acute distress.  Neuro:  A&Ox3  Skin:  Clear of obvious rashes, ecchymoses.  Resp:  Normal respiratory effort; not in acute respiratory distress.   Vasc:  Regular rate.  lymph:  No obvious LE edema bilaterally.     exam:  Vulva is normal in appearance. Minimal urethral prolapse noted. Negative for VANESSA with reduction of speculum when instructed to cough. Vaginal mucosa atrophic. No pain to palpation on internal or external exam. No prolapse appreciated. Strength 1/5. Noted moderate amount of stool noted in colon/rectum on exam; no communication between     No obvious masses, lesions, ulcers, bleeding noted on internal or external exam.          Data:    PVR  27mL    Labs:    UA RESULTS:  Recent Labs   Lab Test 12/08/23  1418 11/17/23  1533   COLOR Yellow Yellow   APPEARANCE Clear Clear   URINEGLC Negative Negative   URINEBILI Negative Negative   URINEKETONE Negative Negative   SG <=1.005 <=1.005   UBLD Small* Small*   URINEPH 6.0 6.0   PROTEIN Negative Negative   UROBILINOGEN 0.2 0.2   NITRITE Negative Negative   LEUKEST  Negative Negative   RBCU  --  0-2   WBCU  --  None Seen     UC  11/22/22 mixed rosalino    Creatinine   Date Value Ref Range Status   11/14/2023 0.91 0.51 - 0.95 mg/dL Final   12/01/2020 0.90 0.52 - 1.04 mg/dL Final       Office Visit on 11/17/2023   Component Date Value Ref Range Status    Interpretation 11/17/2023 Negative for Intraepithelial Lesion or Malignancy (NILM)    Final    Comment 11/17/2023    Final                    Value:This result contains rich text formatting which cannot be displayed here.    Specimen Adequacy 11/17/2023 Satisfactory for evaluation.  Specimen was unable to be imaged on the [facility] [facility] .  Transformation zone component absent, atrophy   Final    Clinical Information 11/17/2023    Final                    Value:This result contains rich text formatting which cannot be displayed here.    Reflex Testing 11/17/2023 Yes regardless of result   Final    Previous Abnormal? 11/17/2023    Final                    Value:This result contains rich text formatting which cannot be displayed here.    Previous Abnormal Diagnosis 11/17/2023    Final                    Value:This result contains rich text formatting which cannot be displayed here.    Performing Labs 11/17/2023    Final                    Value:This result contains rich text formatting which cannot be displayed here.    Neisseria gonorrhoeae 11/17/2023 Negative  Negative Final    Negative for N. gonorrhoeae rRNA by transcription mediated amplification. A negative result by transcription mediated amplification does not preclude the presence of C. trachomatis infection because results are dependent on proper and adequate collection, absence of inhibitors and sufficient rRNA to be detected.    Chlamydia trachomatis 11/17/2023 Negative  Negative Final    A negative result by transcription mediated amplification does not preclude the presence of C. trachomatis infection because results are dependent on proper and adequate  collection, absence of inhibitors and sufficient rRNA to be detected.    Color Urine 11/17/2023 Yellow  Colorless, Straw, Light Yellow, Yellow Final    Appearance Urine 11/17/2023 Clear  Clear Final    Glucose Urine 11/17/2023 Negative  Negative mg/dL Final    Bilirubin Urine 11/17/2023 Negative  Negative Final    Ketones Urine 11/17/2023 Negative  Negative mg/dL Final    Specific Gravity Urine 11/17/2023 <=1.005  1.003 - 1.035 Final    Blood Urine 11/17/2023 Small (A)  Negative Final    pH Urine 11/17/2023 6.0  5.0 - 7.0 Final    Protein Albumin Urine 11/17/2023 Negative  Negative mg/dL Final    Urobilinogen Urine 11/17/2023 0.2  0.2, 1.0 E.U./dL Final    Nitrite Urine 11/17/2023 Negative  Negative Final    Leukocyte Esterase Urine 11/17/2023 Negative  Negative Final    Trichomonas 11/17/2023 Absent  Absent Final    Yeast 11/17/2023 Absent  Absent Final    Clue Cells 11/17/2023 Absent  Absent Final    WBCs/high power field 11/17/2023 1+ (A)  None Final    Bacteria Urine 11/17/2023 Few (A)  None Seen /HPF Final    RBC Urine 11/17/2023 0-2  0-2 /HPF /HPF Final    WBC Urine 11/17/2023 None Seen  0-5 /HPF /HPF Final    Squamous Epithelials Urine 11/17/2023 Few (A)  None Seen /LPF Final    Mucus Urine 11/17/2023 Present (A)  None Seen /LPF Final    Other HR HPV 11/17/2023 Negative  Negative Final    HPV16 DNA 11/17/2023 Negative  Negative Final    HPV18 DNA 11/17/2023 Negative  Negative Final    FINAL DIAGNOSIS 11/17/2023    Final                    Value:This result contains rich text formatting which cannot be displayed here.          KARMA THOMAS PA-C

## 2023-12-08 NOTE — PROGRESS NOTES
Urology Clinic    Name: eMrcedes Lema    MRN: [id number]   YOB: 1960  Accompanied at today's visit by:self              Assessment and Plan:   63 year old female with urethral pain, dyspareunia, nocturia, vaginal atrophy, pelvic floor dysfunction, constipation.    - PVR WNL.   - UA shows hematuria; will send for micro.   - Discussed AUA guidelines and gross hematuria workup. Patient would like to undergo hematuria workup. CT urogram ordered, urine cytology today and pending. Patient requesting female provider, will have her followup with Dr. Angeles for cysto. Advised to have CT done prior to cysto.   - Patient unsure if wants to move forward with PFPT. referral to PFPT placed today and can cancel if changes her mind.   - avoid bladder irritants; IC diet handout given.  - avoid fluids before bed.  - Can try sitz baths to see if helps with discomfort.  - Can try AZO prn to see if helps with symptoms.   - encourage resuming estrogen cream; order to compounding pharmacy sent today.   - Noted constipation on exam. Recommend daily fiber supplement.     Orders Placed This Encounter   Procedures    MEASURE POST-VOID RESIDUAL URINE/BLADDER CAPACITY, US NON-IMAGING (86507)    UA without Microscopic [[id number]]     After discussing the assessment and plan with patient, patient verbalized understanding and agreed to the above plan. All questions answered.     60 minutes were spent today on the date of the encounter in reviewing the EMR, direct patient care, coordination of care and documentation in addition to exam, ordering medication, referral to PFPT, ordering CT scan, reviewing oncology notes from 2009.     Harmony Márquez PA-C  December 8, 2023    Patient Care Team:  Tabitha Quintero MD as PCP - General (Internal Medicine)  Tabitha Quintero MD as Assigned PCP  Bri Fink MD as Assigned OBGYN Provider  Harmony Márquez PA-C as Physician Assistant            Chief Complaint:   Urethral  "pain          History of Present Illness:   December 8, 2023    HISTORY: Mercedes Lema is a 63 year old female as a new consultation for concerns of dysuria and urethral pain. Saw PCP for annual exam on 11/17/23 and reported dysruia. PCP concerned secondary to vaginal atrophy so was started on estrogen cream and referred to our team. Reports over the past year she has noticed urethra discomfort described as feeling \"inflamed\". States the discomfort is constant that waxes and wanes. Unclear if anything specifically triggers hers symptoms to worsen. Denies urinary urgency/frequency during the day. Does report nocturia (3x at night), but associates this with drinking a lot of water before bed. Denies urinary incontinence. Drinks about 1 cup of coffee per day. Denies hx of kidney stones. Does report an episode of gross hematuria shortly after having intercourse about 4 years ago. States they did not find anything on exam that suggested a vaginal source at that time. Denies any further gross hematuria since that time. Denies Luis. 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 Sexually active and reports pain with intercourse with thrusting and penetration. Does state her urethral discomfort worsens after intercourse. Denies hx of abuse. Has not started using estrogen cream. States she was on estrogen cream about 4 years ago but cost $400. Reports regular BMs. Of note, on 11/11/22 had NIL pap with positive HPV. Is s/p colposcopy on 11/22/22 with biopsies showing endocervical polyp and benign endocervical epithelium. Had repeat exam/pap on 11/17/23 and was NIL with negative HPV. PMH is significant for osteopenia, adjustment disorder with anxious mood, HTN. PSH includes supracervical hysterectomy with BSO and breast biopsies.  Denies history of smoking.. Patient voices no other concerns at this time.            Past Medical History:     Past Medical History:   Diagnosis Date    Allergic rhinitis, cause " unspecified     Anxiety state, unspecified     fear of flying (Ativan)    Breast cancer (H) 8/7/2009    Right Breast Cancer    Hypertension     Hyponatremia 1/2/2018            Past Surgical History:     Past Surgical History:   Procedure Laterality Date    BIOPSY  8/7/2009    Right Breast Needle Biopsy    BIOPSY  8/20/2009    MRI Guided Right Breast Biopsy    BIOPSY  8/21/2009    Left Breast US Guided Biopsy    BREAST SURGERY  9/8/2009    Right/Left Lumpectomies    LAPAROSCOPIC HYSTERECTOMY SUPRACERVICAL, BILATERAL SALPINGO-OOPHORECTOMY, COMBINED  2/20/2013    Procedure: COMBINED LAPAROSCOPIC HYSTERECTOMY SUPRACERVICAL, SALPINGO-OOPHORECTOMY;  LAPAROSCOPIC HYSTERECTOMY SUPRACERVICAL, SALPINGO-OOPHORECTOMY (MORCELLATOR, TRIP LOOP)   ;  Surgeon: Suzy Soni MD;  Location: [facility]            Social History:     Social History     Tobacco Use    Smoking status: Never    Smokeless tobacco: Never   Substance Use Topics    Alcohol use: Yes     Comment: Glass of wine/beer 3 days a week            Family History:     Family History   Problem Relation Age of Onset    Cancer Mother         Cervical    Hypertension Mother     Family History Negative Father     Breast Cancer Other         cousin    Cerebrovascular Disease Maternal Grandmother 68              Allergies:     Allergies   Allergen Reactions    Amoxicillin-Pot Clavulanate GI Disturbance    Hctz [Hydrochlorothiazide]     Tequin GI Disturbance            Medications:     Current Outpatient Medications   Medication Sig    Acetaminophen (TYLENOL PO) Take 325-650 mg by mouth every 6 hours as needed for mild pain or fever    amLODIPine (NORVASC) 5 MG tablet Take 1 tablet (5 mg) by mouth daily    CALCIUM 600 + D OR daily (Patient not taking: Reported on 11/17/2023)    cyclobenzaprine (FLEXERIL) 5 MG tablet 1-2 po tid prn    FEXOFENADINE HCL PO Take 180 mg by mouth daily as needed for allergies    LORazepam (ATIVAN) 0.5 MG tablet Take 1 tablet (0.5 mg) by  mouth every 6 hours as needed for anxiety for anxiety    RISEdronate (ACTONEL) 35 MG tablet Take 1 tablet (35 mg) by mouth every 7 days     No current facility-administered medications for this visit.             Review of Systems:    ROS: 14 point ROS neg other than the symptoms noted above in the HPI.          Physical Exam:   Last menstrual period 02/08/2013.  Data Unavailable, There is no height or weight on file to calculate BMI., 0 lbs 0 oz  Gen appearance: Age-appropriate appearing female in NAD.   HEENT:  EOMI, conjunctiva clear/white. Normal ROM of neck for age.   Psych:  alert , In no acute distress.  Neuro:  A&Ox3  Skin:  Clear of obvious rashes, ecchymoses.  Resp:  Normal respiratory effort; not in acute respiratory distress.   Vasc:  Regular rate.  lymph:  No obvious LE edema bilaterally.     exam:  Vulva is normal in appearance. Minimal urethral prolapse noted. Negative for VANESSA with reduction of speculum when instructed to cough. Vaginal mucosa atrophic. No pain to palpation on internal or external exam. No prolapse appreciated. Strength 1/5. Noted moderate amount of stool noted in colon/rectum on exam; no communication between     No obvious masses, lesions, ulcers, bleeding noted on internal or external exam.          Data:    PVR  27mL    Labs:    UA RESULTS:  Recent Labs   Lab Test 12/08/23  1418 11/17/23  1533   COLOR Yellow Yellow   APPEARANCE Clear Clear   URINEGLC Negative Negative   URINEBILI Negative Negative   URINEKETONE Negative Negative   SG <=1.005 <=1.005   UBLD Small* Small*   URINEPH 6.0 6.0   PROTEIN Negative Negative   UROBILINOGEN 0.2 0.2   NITRITE Negative Negative   LEUKEST Negative Negative   RBCU  --  0-2   WBCU  --  None Seen     UC  11/22/22 mixed rosalino    Creatinine   Date Value Ref Range Status   11/14/2023 0.91 0.51 - 0.95 mg/dL Final   12/01/2020 0.90 0.52 - 1.04 mg/dL Final       Office Visit on 11/17/2023   Component Date Value Ref Range Status    Interpretation  11/17/2023 Negative for Intraepithelial Lesion or Malignancy (NILM)    Final    Comment 11/17/2023    Final                    Value:This result contains rich text formatting which cannot be displayed here.    Specimen Adequacy 11/17/2023 Satisfactory for evaluation.  Specimen was unable to be imaged on the Focal[employer] Slide .  Transformation zone component absent, atrophy   Final    Clinical Information 11/17/2023    Final                    Value:This result contains rich text formatting which cannot be displayed here.    Reflex Testing 11/17/2023 Yes regardless of result   Final    Previous Abnormal? 11/17/2023    Final                    Value:This result contains rich text formatting which cannot be displayed here.    Previous Abnormal Diagnosis 11/17/2023    Final                    Value:This result contains rich text formatting which cannot be displayed here.    Performing Labs 11/17/2023    Final                    Value:This result contains rich text formatting which cannot be displayed here.    Neisseria gonorrhoeae 11/17/2023 Negative  Negative Final    Negative for N. gonorrhoeae rRNA by transcription mediated amplification. A negative result by transcription mediated amplification does not preclude the presence of C. trachomatis infection because results are dependent on proper and adequate collection, absence of inhibitors and sufficient rRNA to be detected.    Chlamydia trachomatis 11/17/2023 Negative  Negative Final    A negative result by transcription mediated amplification does not preclude the presence of C. trachomatis infection because results are dependent on proper and adequate collection, absence of inhibitors and sufficient rRNA to be detected.    Color Urine 11/17/2023 Yellow  Colorless, Straw, Light Yellow, Yellow Final    Appearance Urine 11/17/2023 Clear  Clear Final    Glucose Urine 11/17/2023 Negative  Negative mg/dL Final    Bilirubin Urine 11/17/2023 Negative  Negative Final     Ketones Urine 11/17/2023 Negative  Negative mg/dL Final    Specific Gravity Urine 11/17/2023 <=1.005  1.003 - 1.035 Final    Blood Urine 11/17/2023 Small (A)  Negative Final    pH Urine 11/17/2023 6.0  5.0 - 7.0 Final    Protein Albumin Urine 11/17/2023 Negative  Negative mg/dL Final    Urobilinogen Urine 11/17/2023 0.2  0.2, 1.0 E.U./dL Final    Nitrite Urine 11/17/2023 Negative  Negative Final    Leukocyte Esterase Urine 11/17/2023 Negative  Negative Final    Trichomonas 11/17/2023 Absent  Absent Final    Yeast 11/17/2023 Absent  Absent Final    Clue Cells 11/17/2023 Absent  Absent Final    WBCs/high power field 11/17/2023 1+ (A)  None Final    Bacteria Urine 11/17/2023 Few (A)  None Seen /HPF Final    RBC Urine 11/17/2023 0-2  0-2 /HPF /HPF Final    WBC Urine 11/17/2023 None Seen  0-5 /HPF /HPF Final    Squamous Epithelials Urine 11/17/2023 Few (A)  None Seen /LPF Final    Mucus Urine 11/17/2023 Present (A)  None Seen /LPF Final    Other HR HPV 11/17/2023 Negative  Negative Final    HPV16 DNA 11/17/2023 Negative  Negative Final    HPV18 DNA 11/17/2023 Negative  Negative Final    FINAL DIAGNOSIS 11/17/2023    Final                    Value:This result contains rich text formatting which cannot be displayed here.

## 2023-12-12 LAB
PATH REPORT.COMMENTS IMP SPEC: NORMAL
PATH REPORT.FINAL DX SPEC: NORMAL
PATH REPORT.GROSS SPEC: NORMAL
PATH REPORT.MICROSCOPIC SPEC OTHER STN: NORMAL
PATH REPORT.RELEVANT HX SPEC: NORMAL

## 2023-12-20 ENCOUNTER — HOSPITAL ENCOUNTER (OUTPATIENT)
Dept: CT IMAGING | Facility: CLINIC | Age: 63
Discharge: HOME | End: 2023-12-20
Attending: PHYSICIAN ASSISTANT | Admitting: PHYSICIAN ASSISTANT
Payer: COMMERCIAL

## 2023-12-20 DIAGNOSIS — Z87.898 HISTORY OF GROSS HEMATURIA: ICD-10-CM

## 2023-12-20 PROCEDURE — 250N000009 HC RX 250: Performed by: PHYSICIAN ASSISTANT

## 2023-12-20 PROCEDURE — 250N000011 HC RX IP 250 OP 636: Performed by: PHYSICIAN ASSISTANT

## 2023-12-20 PROCEDURE — 74178 CT ABD&PLV WO CNTR FLWD CNTR: CPT

## 2023-12-20 RX ORDER — IOPAMIDOL 755 MG/ML
120 INJECTION, SOLUTION INTRAVASCULAR ONCE
Status: COMPLETED | OUTPATIENT
Start: 2023-12-20 | End: 2023-12-20

## 2023-12-20 RX ADMIN — IOPAMIDOL 120 ML: 755 INJECTION, SOLUTION INTRAVENOUS at 16:27

## 2023-12-20 RX ADMIN — SODIUM CHLORIDE 100 ML: 9 INJECTION, SOLUTION INTRAVENOUS at 16:27

## 2024-01-04 ENCOUNTER — TRANSFERRED RECORDS (OUTPATIENT)
Dept: HEALTH INFORMATION MANAGEMENT | Facility: CLINIC | Age: 64
End: 2024-01-04

## 2024-02-17 DIAGNOSIS — I10 BENIGN ESSENTIAL HYPERTENSION: ICD-10-CM

## 2024-02-20 RX ORDER — AMLODIPINE BESYLATE 5 MG/1
5 TABLET ORAL DAILY
Qty: 90 TABLET | Refills: 3 | OUTPATIENT
Start: 2024-02-20

## 2024-03-26 ENCOUNTER — OFFICE VISIT (OUTPATIENT)
Dept: UROLOGY | Facility: CLINIC | Age: 64
End: 2024-03-26
Payer: COMMERCIAL

## 2024-03-26 VITALS — HEART RATE: 94 BPM | DIASTOLIC BLOOD PRESSURE: 82 MMHG | OXYGEN SATURATION: 97 % | SYSTOLIC BLOOD PRESSURE: 127 MMHG

## 2024-03-26 DIAGNOSIS — M62.89 PELVIC FLOOR DYSFUNCTION: ICD-10-CM

## 2024-03-26 DIAGNOSIS — Z87.898 HISTORY OF GROSS HEMATURIA: ICD-10-CM

## 2024-03-26 DIAGNOSIS — R30.0 DYSURIA: ICD-10-CM

## 2024-03-26 DIAGNOSIS — R39.89 URETHRAL PAIN: Primary | ICD-10-CM

## 2024-03-26 DIAGNOSIS — N94.10 DYSPAREUNIA IN FEMALE: ICD-10-CM

## 2024-03-26 LAB
ALBUMIN UR-MCNC: NEGATIVE MG/DL
APPEARANCE UR: CLEAR
BILIRUB UR QL STRIP: NEGATIVE
COLOR UR AUTO: YELLOW
GLUCOSE UR STRIP-MCNC: NEGATIVE MG/DL
HGB UR QL STRIP: ABNORMAL
KETONES UR STRIP-MCNC: NEGATIVE MG/DL
LEUKOCYTE ESTERASE UR QL STRIP: NEGATIVE
NITRATE UR QL: NEGATIVE
PH UR STRIP: 6 [PH] (ref 5–7)
SP GR UR STRIP: <=1.005 (ref 1–1.03)
UROBILINOGEN UR STRIP-ACNC: 0.2 E.U./DL

## 2024-03-26 PROCEDURE — 81003 URINALYSIS AUTO W/O SCOPE: CPT | Mod: QW | Performed by: UROLOGY

## 2024-03-26 PROCEDURE — 99212 OFFICE O/P EST SF 10 MIN: CPT | Mod: 25 | Performed by: UROLOGY

## 2024-03-26 PROCEDURE — 52000 CYSTOURETHROSCOPY: CPT | Performed by: UROLOGY

## 2024-03-26 NOTE — NURSING NOTE

## 2024-03-26 NOTE — PATIENT INSTRUCTIONS
"Websites with free information:    [employer] patient website: www.voicesforpfd.org    Total Control Program: www.totalcontrolprogram.com    Please see one of the dedicated pelvic floor physical therapist. If you have not heard from the scheduling office within 2 business days, please call [phone] for  [facility]    Please return to see Maria Del Rosario in 12 months, sooner if needed    It was a pleasure meeting with you today.  Thank you for allowing me and my team the privilege of caring for you today.  YOU are the reason we are here, and I truly hope we provided you with the excellent service you deserve.  Please let us know if there is anything else we can do for you so that we can be sure you are leaving completely satisfied with your care experience.    AFTER YOUR CYSTOSCOPY  ?  ?  You have just completed a cystoscopy, or \"cysto\", which allowed your physician to learn more about your bladder (or to remove a stent placed after surgery). We suggest that you continue to avoid caffeine, fruit juice, and alcohol for the next 24 hours, however, you are encouraged to return to your normal activities.  ?  ?  A few things that are considered normal after your cystoscopy:  ?  * small amount of bleeding (or spotting) that clears within the next 24 hours  ?  * slight burning sensation with urination  ?  * sensation of needing to void (urinate) more frequently  ?  * the feeling of \"air\" in your urine  ?  * mild discomfort that is relieved with Tylenol    * bladder spasms  ?  ?  ?  Please contact our office promptly if you:  ?  * develop a fever above 101 degrees  ?  * are unable to urinate  ?  * develop bright red blood that does not stop  ?  * experience severe pain or swelling  ?  ?  ?  And of course, please contact our office with any concerns or questions [phone].  ?   AFTER YOUR CYSTOSCOPY        You have just completed a cystoscopy, or \"cysto\", which allowed your physician to learn more about " "your bladder (or to remove a stent placed after surgery). We suggest that you continue to avoid caffeine, fruit juice, and alcohol for the next 24 hours, however, you are encouraged to return to your normal activities.         A few things that are considered normal after your cystoscopy:     * Small amount of bleeding (or spotting) that clears within the next 24 hours     * Slight burning sensation with urination     * Sensation to of needing to avoid more frequently     * The feeling of \"air\" in your urine     * Mild discomfort that is relieved with Tylenol        Please contact our office promptly if you:     * Develop a fever above 101 degrees     * Are unable to urinate     * Develop bright red blood that does not stop     * Severe pain or swelling         Please contact our office with any concerns or questions @[email or web address].  "

## 2024-03-26 NOTE — LETTER
3/26/2024       RE: Mercedes Lema  [address] [place] [address]     Dear Colleague,    Thank you for referring your patient, Mercedes Lema, to the [facility] [place] [facility]. Please see a copy of my visit note below.    March 26, 2024    Return visit    Patient returns today for follow up for cystoscopy given her history of gross hematuria   She denies any changes in her health since last visit.    /82   Pulse 94   LMP 02/08/2013   SpO2 97%   She is comfortable, in no distress, non-labored breathing.  Abdomen is soft, non-tender, non-distended.  Normal external female genitalia.  Negative CST.  Pelvic exam is remarkable for inability to localize pelvic floor musculature.    Cystoscopy Note: After informed consent was obtained patient was prepped and draped in the standard fashion.  The flexible cystoscope was inserted into a normal appearing urethral meatus.  The urothelium was carefully examined and there were no tumors, masses, stones, foreign bodies, or other urothelial abnormalities noted.  Bilateral ureteral orifices were noted in the normal orthotopic position and both effluxed clear urine.  The cystoscope was retroflexed and the bladder neck was unremarkable.  The urethra was carefully examined upon removing the cystoscope and was unremarkable.  Patient tolerated the procedure without complications noted.      A/P: 64 year old F with urethral pain, dysuria, dyspareunia, pelvic floor dysfunction and history of hematuria    Urologic evaluation negative    Repeat microscopic urinalysis in about 1 year    Offered PT but patient politely declines today    RTC 1 year to see LIDA, sooner if needed    10 minutes were spent today on the date of the encounter in reviewing the EMR, direct patient care, coordination of care and documentation in addition to the cystoscopy procedure    Sarah Angeles MD  MPH  (she/her/hers)   of Urology  [facility]      CC  Patient Care Team:  Tabitha Quintero MD as PCP - General (Internal Medicine)  Tabitha Quintero MD as Assigned PCP  Bri Fink MD as Assigned OBGYN Provider  Harmony Márquez PA-C as Physician Assistant  Harmony Márquez PA-C as Assigned Surgical Provider

## 2024-03-26 NOTE — PROGRESS NOTES
March 26, 2024    Return visit    Patient returns today for follow up for cystoscopy given her history of gross hematuria   She denies any changes in her health since last visit.    /82   Pulse 94   LMP 02/08/2013   SpO2 97%   She is comfortable, in no distress, non-labored breathing.  Abdomen is soft, non-tender, non-distended.  Normal external female genitalia.  Negative CST.  Pelvic exam is remarkable for inability to localize pelvic floor musculature.    Cystoscopy Note: After informed consent was obtained patient was prepped and draped in the standard fashion.  The flexible cystoscope was inserted into a normal appearing urethral meatus.  The urothelium was carefully examined and there were no tumors, masses, stones, foreign bodies, or other urothelial abnormalities noted.  Bilateral ureteral orifices were noted in the normal orthotopic position and both effluxed clear urine.  The cystoscope was retroflexed and the bladder neck was unremarkable.  The urethra was carefully examined upon removing the cystoscope and was unremarkable.  Patient tolerated the procedure without complications noted.      A/P: 64 year old F with urethral pain, dysuria, dyspareunia, pelvic floor dysfunction and history of hematuria    Urologic evaluation negative    Repeat microscopic urinalysis in about 1 year    Offered PT but patient politely declines today    RTC 1 year to see LIDA, sooner if needed    10 minutes were spent today on the date of the encounter in reviewing the EMR, direct patient care, coordination of care and documentation in addition to the cystoscopy procedure    Sarah Angeles MD MPH  (she/her/hers)   of Urology  [facility]  Patient Care Team:  Tabitha Quintero MD as PCP - General (Internal Medicine)  Tabitha Quintero MD as Assigned PCP  Bri Fink MD as Assigned OBGYN Provider  Harmony Márquez PA-C as Physician Assistant  Harmony Márquez PA-C  as Assigned Surgical Provider

## 2024-05-17 ENCOUNTER — VIRTUAL VISIT (OUTPATIENT)
Dept: PEDIATRICS | Facility: CLINIC | Age: 64
End: 2024-05-17
Payer: COMMERCIAL

## 2024-05-17 DIAGNOSIS — M62.838 MUSCLE SPASM: ICD-10-CM

## 2024-05-17 DIAGNOSIS — M62.81 GENERALIZED MUSCLE WEAKNESS: ICD-10-CM

## 2024-05-17 PROCEDURE — 99443 PR PHYSICIAN TELEPHONE EVALUATION 21-30 MIN: CPT | Mod: 93 | Performed by: INTERNAL MEDICINE

## 2024-05-17 NOTE — PROGRESS NOTES
"Mercedes is a 64 year old who is being evaluated via a billable telephone visit.      Originating Location (pt. Location): Home    Distant Location (provider location):  Off-site    Assessment & Plan       ICD-10-CM    1. Muscle spasm  M62.838 CBC with platelets and differential     CRP, inflammation     CK total     Adult Neurology  Referral     Folate     Vitamin B12     Ferritin     Iron and iron binding capacity      2. Generalized muscle weakness  M62.81 CBC with platelets and differential     CRP, inflammation     CK total     Adult Neurology  Referral     Folate     Vitamin B12     Ferritin     Iron and iron binding capacity        Pt reporting slowly progressive symptoms of muscle spasms (or \"pulsations\"), weakness, and some pain (ebbs and flows) that started in the lower extremities but have spread to other areas of her body including torso, upper extremities, and face.  Reports these are now daily and occurring most of the day and at night.  Sometimes it prevents her from falling back asleep.  This is distressing and limiting her ability to be as physically active as she would like.    I informed patient that given the nature of virtual medicine, there will not be a normal full physical examination and consequently, it is possible for some findings to be missed as they will not be as obvious as when in-person examination is performed.  A lack of access to information available at in-office visits, but not in virtual visits, may result in delays in medical care.  Patient fully informed and consents to proceed with virtual visit and plan of care as documented.    Pt is seeking a referral to see neuromuscular specialist.  I did advise that an initial work-up would be recommended first to rule out other etiologies including but not limited to autoimmune, inflammatory, vascular, etc.    We agreed to place a referral for neurology for now and initiate a lab work-up to begin with, to assess for " inflammation of the muscles or deficiencies in iron or other vitamins/minerals.  I will review her results and provide further advice based on findings.        See Patient Instructions    Shanta Long is a 64 year old, presenting for the following health issues:  No chief complaint on file.    HPI     Last August woke up with sensation of leg pulsating.  More persistent.  Can visually see it.  Pulsation or spasm.  Starting to get painful/achy and discomfort.  Up to a 6/10 at its worse.  Pulsating.  Legs feel weaker than they did in the past.  Not sure if there is muscle loss.  Seems to be worsening, not improving.  Started in legs and now can feel it on her arm or face.      All other systems on a 10-point review are negative, unless otherwise noted in HPI        Objective           Vitals:  No vitals were obtained today due to virtual visit.    Physical Exam   General: Alert and no distress //Respiratory: No audible wheeze, cough, or shortness of breath // Psychiatric:  Appropriate affect, tone, and pace of words            Phone call duration: 27 minutes  Signed Electronically by: Ignacio Geronimo MD

## 2024-05-21 ENCOUNTER — LAB (OUTPATIENT)
Dept: LAB | Facility: CLINIC | Age: 64
End: 2024-05-21
Payer: COMMERCIAL

## 2024-05-21 DIAGNOSIS — M62.838 MUSCLE SPASM: ICD-10-CM

## 2024-05-21 DIAGNOSIS — M62.81 GENERALIZED MUSCLE WEAKNESS: ICD-10-CM

## 2024-05-21 LAB
BASOPHILS # BLD AUTO: 0 10E3/UL (ref 0–0.2)
BASOPHILS NFR BLD AUTO: 0 %
CK SERPL-CCNC: 105 U/L (ref 26–192)
CRP SERPL-MCNC: <3 MG/L
EOSINOPHIL # BLD AUTO: 0.4 10E3/UL (ref 0–0.7)
EOSINOPHIL NFR BLD AUTO: 7 %
ERYTHROCYTE [DISTWIDTH] IN BLOOD BY AUTOMATED COUNT: 14.1 % (ref 10–15)
FERRITIN SERPL-MCNC: 93 NG/ML (ref 11–328)
FOLATE SERPL-MCNC: 17.9 NG/ML (ref 4.6–34.8)
HCT VFR BLD AUTO: 39.2 % (ref 35–47)
HGB BLD-MCNC: 12.9 G/DL (ref 11.7–15.7)
IMM GRANULOCYTES # BLD: 0 10E3/UL
IMM GRANULOCYTES NFR BLD: 0 %
IRON BINDING CAPACITY (ROCHE): 280 UG/DL (ref 240–430)
IRON SATN MFR SERPL: 25 % (ref 15–46)
IRON SERPL-MCNC: 71 UG/DL (ref 37–145)
LYMPHOCYTES # BLD AUTO: 1.2 10E3/UL (ref 0.8–5.3)
LYMPHOCYTES NFR BLD AUTO: 24 %
MCH RBC QN AUTO: 27 PG (ref 26.5–33)
MCHC RBC AUTO-ENTMCNC: 32.9 G/DL (ref 31.5–36.5)
MCV RBC AUTO: 82 FL (ref 78–100)
MONOCYTES # BLD AUTO: 0.4 10E3/UL (ref 0–1.3)
MONOCYTES NFR BLD AUTO: 7 %
NEUTROPHILS # BLD AUTO: 3.2 10E3/UL (ref 1.6–8.3)
NEUTROPHILS NFR BLD AUTO: 62 %
PLATELET # BLD AUTO: 296 10E3/UL (ref 150–450)
RBC # BLD AUTO: 4.78 10E6/UL (ref 3.8–5.2)
VIT B12 SERPL-MCNC: 1142 PG/ML (ref 232–1245)
WBC # BLD AUTO: 5.1 10E3/UL (ref 4–11)

## 2024-05-21 PROCEDURE — 83540 ASSAY OF IRON: CPT

## 2024-05-21 PROCEDURE — 85025 COMPLETE CBC W/AUTO DIFF WBC: CPT

## 2024-05-21 PROCEDURE — 82607 VITAMIN B-12: CPT

## 2024-05-21 PROCEDURE — 82728 ASSAY OF FERRITIN: CPT

## 2024-05-21 PROCEDURE — 36415 COLL VENOUS BLD VENIPUNCTURE: CPT

## 2024-05-21 PROCEDURE — 82746 ASSAY OF FOLIC ACID SERUM: CPT

## 2024-05-21 PROCEDURE — 82550 ASSAY OF CK (CPK): CPT

## 2024-05-21 PROCEDURE — 86140 C-REACTIVE PROTEIN: CPT

## 2024-05-21 PROCEDURE — 83550 IRON BINDING TEST: CPT

## 2024-05-30 ENCOUNTER — TELEPHONE (OUTPATIENT)
Dept: NEUROLOGY | Facility: CLINIC | Age: 64
End: 2024-05-30
Payer: COMMERCIAL

## 2024-05-30 NOTE — TELEPHONE ENCOUNTER
Left reminder voicemail for 05/31 appointment. Instructed to call [phone] if this appointment needs to be changed or rescheduled. AS

## 2024-05-30 NOTE — PROGRESS NOTES
INITIAL NEUROLOGY CONSULTATION    DATE OF VISIT: 5/31/2024  CLINIC LOCATION: [facility]  MRN: [id number]  PATIENT NAME: Mercedes Lema  YOB: 1960    REASON FOR VISIT: No chief complaint on file.    HISTORY OF PRESENT ILLNESS:                                                    Ms. Mercedes Lema is 64 year old right handed female patient with past medical history of hypertension and breast cancer, who was seen today for muscle spasms/generalized muscle weakness.    Per patient's report, symptoms started ***.  The patient developed muscle spasms and weakness along with some pain in lower extremities that eventually spread to other areas of her body, including torso and upper extremities as well as her face.    Laboratory evaluation from May 2024 includes normal CK, CRP, ferritin (93), folate, iron, vitamin B12 (1142), and CBC.  TSH was normal in November 2021 (0.5).    No additional useful information is available in Care Everywhere, which was reviewed.  PAST MEDICAL/SURGICAL HISTORY:                                                    I personally reviewed patient's past medical and surgical history with the patient at today's visit.  MEDICATIONS:                                                    I personally reviewed patient's medications and allergies with the patient at today's visit.  ALLERGIES:                                                      Allergies   Allergen Reactions    Amoxicillin-Pot Clavulanate GI Disturbance    Hctz [Hydrochlorothiazide]     Tequin GI Disturbance     EXAM:                                                    VITAL SIGNS:   LMP 02/08/2013   Mini-Cog Assessment:       General: pt is in NAD, cooperative.  Skin: normal turgor, moist mucous membranes, no lesions/rashes noticed.  HEENT: ATNC, EOMI, PERRL, white sclera, normal conjunctiva, no nystagmus or ptosis. No carotid bruits bilaterally.  Respiratory: lung sounds clear to auscultation  bilaterally, no crackles, wheezes, rhonchi. Symmetric lung excursion, no accessory respiratory muscle use.  Cardiovascular: normal S1/S2, no murmurs/rubs/gallops.   Abdomen: Not distended.  : deferred.    Neurological:  Mental: alert, follows commands,  /5 with ***/3 on memory recall, no aphasia or dysarthria. Fund of knowledge is {MYAPPROPRIATE:412920}  Cranial Nerves:  CN II: visual acuity - able to accurately count fingers with each eye. Visual fields intact, fundi: discs sharp, no papilledema and normal vessels bilaterally.  CN III, IV, VI: EOM intact, pupils equal and reactive  CN V: facial sensation nl  CN VII: face symmetric, no facial droop  CN VIII: hearing normal  CN IX: palate elevation symmetric, uvula at midline  CN XI SCM normal, shoulder shrug nl  CN XII: tongue midline  Motor: Strength: 5/5 in all major groups of all extremities. Normal tone. No abnormal movements. No pronator drift b/l.  Reflexes: Triceps, biceps, brachioradialis, patellar, and achilles reflexes normal and symmetric. No clonus noted. Toes are down-going b/l.   Sensory: light touch, pinprick, and vibration intact. Romberg: negative.  Coordination: FNF and heel-shin tests intact b/l.   Gait:  Normal, able to tandem walk *** without difficulty.  DATA:   LABS/EEG/IMAGING/OTHER STUDIES: I reviewed pertinent medical records, as detailed in the history of present illness.  ASSESSMENT AND PLAN:      ASSESSMENT: Mercedes Lema is a 64 year old female patient with listed above past medical history, who presents with ***.    We had a detailed discussion with the patient regarding her presenting complaints.  The neurological exam today is ***.    DIAGNOSES:  No diagnosis found.  PLAN: There are no Patient Instructions on file for this visit.    Total Time: *** minutes spent on the date of the encounter doing chart review, history and exam, documentation and further activities per the note.    Haim Fitzgerald MD  [facility]  Neurology  (Chart documentation was completed in part with Dragon voice-recognition software. Even though reviewed, some grammatical, spelling, and word errors may remain.)

## 2024-05-31 ENCOUNTER — OFFICE VISIT (OUTPATIENT)
Dept: NEUROLOGY | Facility: CLINIC | Age: 64
End: 2024-05-31
Attending: INTERNAL MEDICINE
Payer: COMMERCIAL

## 2024-05-31 VITALS
BODY MASS INDEX: 20.18 KG/M2 | HEIGHT: 64 IN | HEART RATE: 83 BPM | DIASTOLIC BLOOD PRESSURE: 80 MMHG | OXYGEN SATURATION: 98 % | WEIGHT: 118.2 LBS | SYSTOLIC BLOOD PRESSURE: 130 MMHG

## 2024-05-31 DIAGNOSIS — M62.838 MUSCLE SPASM: Primary | ICD-10-CM

## 2024-05-31 DIAGNOSIS — R25.3 BENIGN FASCICULATIONS: ICD-10-CM

## 2024-05-31 PROCEDURE — 99205 OFFICE O/P NEW HI 60 MIN: CPT | Performed by: PSYCHIATRY & NEUROLOGY

## 2024-05-31 PROCEDURE — G2211 COMPLEX E/M VISIT ADD ON: HCPCS | Performed by: PSYCHIATRY & NEUROLOGY

## 2024-05-31 RX ORDER — TIZANIDINE 2 MG/1
2 TABLET ORAL 2 TIMES DAILY PRN
Qty: 30 TABLET | Refills: 3 | Status: SHIPPED | OUTPATIENT
Start: 2024-05-31

## 2024-05-31 NOTE — LETTER
"    5/31/2024         RE: Mercedes Lema  [address] [place] [address]        Dear Colleague,    Thank you for referring your patient, Mercedes Lema, to the [facility] NEUROLOGY [facility]. Please see a copy of my visit note below.    INITIAL NEUROLOGY CONSULTATION    DATE OF VISIT: 5/31/2024  CLINIC LOCATION: [facility]  MRN: [id number]  PATIENT NAME: Mercedes Lema  YOB: 1960    REASON FOR VISIT:   Chief Complaint   Patient presents with     Consult     HISTORY OF PRESENT ILLNESS:                                                    Ms. Mercedes Lema is 64 year old right handed female patient with past medical history of hypertension and breast cancer, who was seen today for muscle spasms/twitches.    Per patient's report, symptoms started in August 2023.  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  The patient developed muscle \"pulsations\"/twitching in lower extremities that eventually spread to other areas of her body, including torso and upper extremities as well as her face.  They are most prevalent in her calf muscles, but could be experienced elsewhere.  At times she might have more prolonged spasms, but they are not frequent.  She also reports occasional shooting pain, not related to muscle twitching.  Symptoms do not affect her everyday life.  No muscle atrophy.  No weakness.  No numbness or tingling.  She denies any additional focal neurological symptoms.  No prior history of significant head injuries, seizures, CNS infections, and strokes.    Family history is positive for stroke.    Laboratory evaluation from May 2024 includes normal CK, CRP, ferritin (93), folate, iron, vitamin B12 (1142), and CBC.  TSH was normal in November 2021 (0.5).    No additional useful information is available in Care Everywhere, which was reviewed.  PAST MEDICAL/SURGICAL HISTORY: " "                                                   I personally reviewed patient's past medical and surgical history with the patient at today's visit.  MEDICATIONS:                                                    I personally reviewed patient's medications and allergies with the patient at today's visit.  ALLERGIES:                                                      Allergies   Allergen Reactions     Amoxicillin-Pot Clavulanate GI Disturbance     Hctz [Hydrochlorothiazide]      Tequin GI Disturbance     EXAM:                                                    VITAL SIGNS:   /80 (BP Location: Left arm, Patient Position: Sitting, Cuff Size: Adult Regular)   Pulse 83   Ht 1.626 m (5' 4\")   Wt 53.6 kg (118 lb 3.2 oz)   LMP 02/08/2013   SpO2 98%   BMI 20.29 kg/m    Mini-Cog Assessment:  Mini Cog Assessment  Clock Draw Score: 2 Normal  3 Item Recall: 3 objects recalled  Mini Cog Total Score: 5  Administered by: : Yaz PAIGE    General: pt is in NAD, cooperative.  Skin: normal turgor, moist mucous membranes, no lesions/rashes noticed.  HEENT: ATNC, EOMI, PERRL, white sclera, normal conjunctiva, no nystagmus or ptosis. No carotid bruits bilaterally.  Respiratory: lung sounds clear to auscultation bilaterally, no crackles, wheezes, rhonchi. Symmetric lung excursion, no accessory respiratory muscle use.  Cardiovascular: normal S1/S2, no murmurs/rubs/gallops.   Abdomen: Not distended.  : deferred.    Neurological:  Mental: alert, follows commands, Mini Cog Total Score: 5/5 with 3/3 on memory recall, no aphasia or dysarthria. Fund of knowledge is appropriate for age.  Cranial Nerves:  CN II: visual acuity - able to accurately count fingers with each eye. Visual fields intact, fundi: discs sharp, no papilledema and normal vessels bilaterally.  CN III, IV, VI: EOM intact, pupils equal and reactive  CN V: facial sensation nl  CN VII: face symmetric, no facial droop  CN VIII: hearing normal  CN IX: palate elevation " symmetric, uvula at midline  CN XI SCM normal, shoulder shrug nl  CN XII: tongue midline  Motor: Strength: 5/5 in all major groups of all extremities. Normal tone.  Fasciculations were observed twice in the left calf muscles.  No fasciculations upon stimulation.  No other abnormal movements. No pronator drift b/l.  Reflexes: Triceps, biceps, brachioradialis, patellar, and achilles reflexes normal and symmetric. No clonus noted. Toes are down-going b/l.   Sensory: light touch, pinprick, and vibration intact. Romberg: negative.  Coordination: FNF and heel-shin tests intact b/l.   Gait:  Normal, able to tandem walk without difficulty.  DATA:   LABS/EEG/IMAGING/OTHER STUDIES: I reviewed pertinent medical records, as detailed in the history of present illness.  ASSESSMENT AND PLAN:      ASSESSMENT: Mercedes Lema is a 64 year old female patient with listed above past medical history, who presents with intermittent muscle twitching since August 2023 in context of preceding increased stress.    We had a detailed discussion with the patient regarding her presenting complaints.  The neurological exam today is non-focal.  We discussed that her presentation is likely consistent with benign fasciculations or possibly cramp fasciculation syndrome, but I do not suspect motor neuron disease or myopathy.  Her CK was normal, so as other screening labs, that were done by her primary care provider.  I do not feel that she needs additional workup, but we discussed the utility of obtaining EMG in the future if her symptoms progress.  We also reviewed various treatment and decided to try tizanidine.  Carbamazepine might also be considered in the future.    DIAGNOSES:    ICD-10-CM    1. Muscle spasm  M62.838 Adult Neurology  Referral      2. Generalized muscle weakness  M62.81 Adult Neurology  Referral        PLAN: At today's visit we thoroughly discussed various diagnostic possibilities for patient's symptoms,  "possible future evaluation (EMG), available treatment options, and the plan.     We decided to try tizanidine.  She could take 2 mg up to 2 times per day as needed for muscle spasms and let me know if she experiences any intolerable side effects.      Will monitor her symptoms and decide whether more evaluation is needed in the future.    Next follow-up appointment is in the next 4 months or earlier if needed.    Total Time: 61 minutes spent on the date of the encounter doing chart review, history and exam, documentation and further activities per the note.    Haim Fitzgerald MD  [facility] Neurology  (Chart documentation was completed in part with Dragon voice-recognition software. Even though reviewed, some grammatical, spelling, and word errors may remain.)            Mercedes Lema is a 64 year old female who presents for:  Chief Complaint   Patient presents with     Consult     Paresthesia- having a pulsing/cramping sensation started in legs (most bothersome) but now has them in other area of the body as well         Initial Vitals:  /80 (BP Location: Left arm, Patient Position: Sitting, Cuff Size: Adult Regular)   Pulse 83   Ht 1.626 m (5' 4\")   Wt 53.6 kg (118 lb 3.2 oz)   LMP 02/08/2013   SpO2 98%   BMI 20.29 kg/m   Estimated body mass index is 20.29 kg/m  as calculated from the following:    Height as of this encounter: 1.626 m (5' 4\").    Weight as of this encounter: 53.6 kg (118 lb 3.2 oz).. Body surface area is 1.56 meters squared. BP completed using cuff size: regular    Yaz Crum       Again, thank you for allowing me to participate in the care of your patient.        Sincerely,        Haim Fitzgerald MD  "

## 2024-05-31 NOTE — PROGRESS NOTES
"INITIAL NEUROLOGY CONSULTATION    DATE OF VISIT: 5/31/2024  CLINIC LOCATION: [facility]  MRN: [id number]  PATIENT NAME: Mercedes Lema  YOB: 1960    REASON FOR VISIT:   Chief Complaint   Patient presents with    Consult     HISTORY OF PRESENT ILLNESS:                                                    Ms. Mercedes Lema is 64 year old right handed female patient with past medical history of hypertension and breast cancer, who was seen today for muscle spasms/twitches.    Per patient's report, symptoms started in August 2023.  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  The patient developed muscle \"pulsations\"/twitching in lower extremities that eventually spread to other areas of her body, including torso and upper extremities as well as her face.  They are most prevalent in her calf muscles, but could be experienced elsewhere.  At times she might have more prolonged spasms, but they are not frequent.  She also reports occasional shooting pain, not related to muscle twitching.  Symptoms do not affect her everyday life.  No muscle atrophy.  No weakness.  No numbness or tingling.  She denies any additional focal neurological symptoms.  No prior history of significant head injuries, seizures, CNS infections, and strokes.    Family history is positive for stroke.    Laboratory evaluation from May 2024 includes normal CK, CRP, ferritin (93), folate, iron, vitamin B12 (1142), and CBC.  TSH was normal in November 2021 (0.5).    No additional useful information is available in Care Everywhere, which was reviewed.  PAST MEDICAL/SURGICAL HISTORY:                                                    I personally reviewed patient's past medical and surgical history with the patient at today's visit.  MEDICATIONS:                                                    I personally reviewed patient's medications " "and allergies with the patient at today's visit.  ALLERGIES:                                                      Allergies   Allergen Reactions    Amoxicillin-Pot Clavulanate GI Disturbance    Hctz [Hydrochlorothiazide]     Tequin GI Disturbance     EXAM:                                                    VITAL SIGNS:   /80 (BP Location: Left arm, Patient Position: Sitting, Cuff Size: Adult Regular)   Pulse 83   Ht 1.626 m (5' 4\")   Wt 53.6 kg (118 lb 3.2 oz)   LMP 02/08/2013   SpO2 98%   BMI 20.29 kg/m    Mini-Cog Assessment:  Mini Cog Assessment  Clock Draw Score: 2 Normal  3 Item Recall: 3 objects recalled  Mini Cog Total Score: 5  Administered by: : Yaz PAIGE    General: pt is in NAD, cooperative.  Skin: normal turgor, moist mucous membranes, no lesions/rashes noticed.  HEENT: ATNC, EOMI, PERRL, white sclera, normal conjunctiva, no nystagmus or ptosis. No carotid bruits bilaterally.  Respiratory: lung sounds clear to auscultation bilaterally, no crackles, wheezes, rhonchi. Symmetric lung excursion, no accessory respiratory muscle use.  Cardiovascular: normal S1/S2, no murmurs/rubs/gallops.   Abdomen: Not distended.  : deferred.    Neurological:  Mental: alert, follows commands, Mini Cog Total Score: 5/5 with 3/3 on memory recall, no aphasia or dysarthria. Fund of knowledge is appropriate for age.  Cranial Nerves:  CN II: visual acuity - able to accurately count fingers with each eye. Visual fields intact, fundi: discs sharp, no papilledema and normal vessels bilaterally.  CN III, IV, VI: EOM intact, pupils equal and reactive  CN V: facial sensation nl  CN VII: face symmetric, no facial droop  CN VIII: hearing normal  CN IX: palate elevation symmetric, uvula at midline  CN XI SCM normal, shoulder shrug nl  CN XII: tongue midline  Motor: Strength: 5/5 in all major groups of all extremities. Normal tone.  Fasciculations were observed twice in the left calf muscles.  No fasciculations upon stimulation.  " No other abnormal movements. No pronator drift b/l.  Reflexes: Triceps, biceps, brachioradialis, patellar, and achilles reflexes normal and symmetric. No clonus noted. Toes are down-going b/l.   Sensory: light touch, pinprick, and vibration intact. Romberg: negative.  Coordination: FNF and heel-shin tests intact b/l.   Gait:  Normal, able to tandem walk without difficulty.  DATA:   LABS/EEG/IMAGING/OTHER STUDIES: I reviewed pertinent medical records, as detailed in the history of present illness.  ASSESSMENT AND PLAN:      ASSESSMENT: Mercedes Lema is a 64 year old female patient with listed above past medical history, who presents with intermittent muscle twitching since August 2023 in context of preceding increased stress.    We had a detailed discussion with the patient regarding her presenting complaints.  The neurological exam today is non-focal.  We discussed that her presentation is likely consistent with benign fasciculations or possibly cramp fasciculation syndrome, but I do not suspect motor neuron disease or myopathy.  Her CK was normal, so as other screening labs, that were done by her primary care provider.  I do not feel that she needs additional workup, but we discussed the utility of obtaining EMG in the future if her symptoms progress.  We also reviewed various treatment and decided to try tizanidine.  Carbamazepine might also be considered in the future.    DIAGNOSES:    ICD-10-CM    1. Muscle spasm  M62.838 Adult Neurology  Referral      2. Generalized muscle weakness  M62.81 Adult Neurology  Referral        PLAN: At today's visit we thoroughly discussed various diagnostic possibilities for patient's symptoms, possible future evaluation (EMG), available treatment options, and the plan.     We decided to try tizanidine.  She could take 2 mg up to 2 times per day as needed for muscle spasms and let me know if she experiences any intolerable side effects.      Will monitor her  symptoms and decide whether more evaluation is needed in the future.    Next follow-up appointment is in the next 4 months or earlier if needed.    Total Time: 61 minutes spent on the date of the encounter doing chart review, history and exam, documentation and further activities per the note.    Haim Fitzgerald MD  [facility] Neurology  (Chart documentation was completed in part with Dragon voice-recognition software. Even though reviewed, some grammatical, spelling, and word errors may remain.)

## 2024-05-31 NOTE — PROGRESS NOTES
"Mercedes Lema is a 64 year old female who presents for:  Chief Complaint   Patient presents with    Consult     Paresthesia- having a pulsing/cramping sensation started in legs (most bothersome) but now has them in other area of the body as well         Initial Vitals:  /80 (BP Location: Left arm, Patient Position: Sitting, Cuff Size: Adult Regular)   Pulse 83   Ht 1.626 m (5' 4\")   Wt 53.6 kg (118 lb 3.2 oz)   LMP 02/08/2013   SpO2 98%   BMI 20.29 kg/m   Estimated body mass index is 20.29 kg/m  as calculated from the following:    Height as of this encounter: 1.626 m (5' 4\").    Weight as of this encounter: 53.6 kg (118 lb 3.2 oz).. Body surface area is 1.56 meters squared. BP completed using cuff size: regular    Yaz Crum   "

## 2024-05-31 NOTE — PATIENT INSTRUCTIONS
AFTER VISIT SUMMARY (AVS):    At today's visit we thoroughly discussed various diagnostic possibilities for your symptoms, possible future evaluation (EMG), available treatment options, and the plan.     We decided to try tizanidine.  You could take 2 mg up to 2 times per day as needed for muscle spasms and let me know if you experience any intolerable side effects.      Will monitor your symptoms and decide whether more evaluation is needed in the future.    Next follow-up appointment is in the next 4 months or earlier if needed.    Please do not hesitate to call me with any questions or concerns.    Thanks.

## 2024-06-10 ENCOUNTER — PATIENT OUTREACH (OUTPATIENT)
Dept: CARE COORDINATION | Facility: CLINIC | Age: 64
End: 2024-06-10
Payer: COMMERCIAL

## 2024-07-08 ENCOUNTER — PATIENT OUTREACH (OUTPATIENT)
Dept: CARE COORDINATION | Facility: CLINIC | Age: 64
End: 2024-07-08
Payer: COMMERCIAL

## 2024-07-12 ENCOUNTER — HOSPITAL ENCOUNTER (OUTPATIENT)
Dept: MAMMOGRAPHY | Facility: CLINIC | Age: 64
Discharge: HOME | End: 2024-07-12
Attending: INTERNAL MEDICINE | Admitting: INTERNAL MEDICINE
Payer: COMMERCIAL

## 2024-07-12 DIAGNOSIS — Z12.31 VISIT FOR SCREENING MAMMOGRAM: ICD-10-CM

## 2024-07-12 PROCEDURE — 77063 BREAST TOMOSYNTHESIS BI: CPT

## 2024-09-26 ENCOUNTER — OFFICE VISIT (OUTPATIENT)
Dept: NEUROLOGY | Facility: CLINIC | Age: 64
End: 2024-09-26
Payer: COMMERCIAL

## 2024-09-26 VITALS — SYSTOLIC BLOOD PRESSURE: 132 MMHG | DIASTOLIC BLOOD PRESSURE: 87 MMHG | OXYGEN SATURATION: 98 % | HEART RATE: 68 BPM

## 2024-09-26 DIAGNOSIS — R25.3 BENIGN FASCICULATIONS: ICD-10-CM

## 2024-09-26 DIAGNOSIS — M62.838 MUSCLE SPASM: Primary | ICD-10-CM

## 2024-09-26 PROCEDURE — G2211 COMPLEX E/M VISIT ADD ON: HCPCS | Performed by: PSYCHIATRY & NEUROLOGY

## 2024-09-26 PROCEDURE — 99215 OFFICE O/P EST HI 40 MIN: CPT | Performed by: PSYCHIATRY & NEUROLOGY

## 2024-09-26 NOTE — PROGRESS NOTES
"Mercedes Lema is a 64 year old female who presents for:  Chief Complaint   Patient presents with    Follow Up     Muscle Spasm/Fasciation         Initial Vitals:  /87 (BP Location: Right arm, Patient Position: Sitting, Cuff Size: Adult Regular)   Pulse 68   LMP 02/08/2013   SpO2 98%  Estimated body mass index is 20.29 kg/m  as calculated from the following:    Height as of 5/31/24: 1.626 m (5' 4\").    Weight as of 5/31/24: 53.6 kg (118 lb 3.2 oz).. There is no height or weight on file to calculate BSA. BP completed using cuff size: regular    Yaz Crum   "

## 2024-09-26 NOTE — PATIENT INSTRUCTIONS
AFTER VISIT SUMMARY (AVS):    At today's visit we thoroughly discussed current symptoms, possible future evaluation (EMG), available treatment options, and the plan.    We decided to make any medication changes.  Please reach out if you need a refill of tizanidine prior to your next visit.    Next follow-up appointment is in the next 4 months or earlier if needed.    Please do not hesitate to call me with any questions or concerns.    Thanks.

## 2024-09-26 NOTE — PROGRESS NOTES
ESTABLISHED PATIENT NEUROLOGY NOTE    DATE OF VISIT: 9/26/2024  CLINIC LOCATION: [facility]  MRN: [id number]  PATIENT NAME: Mercedes Lema  YOB: 1960    REASON FOR VISIT:   Chief Complaint   Patient presents with    Follow Up     Muscle Spasm/Fasciation      SUBJECTIVE:                                                      HISTORY OF PRESENT ILLNESS: Patient is here to follow up regarding muscle spasms/twitching. Please refer to my initial note from 5/31/2024 for further information.    Since the last visit, the patient reports that she experiences more soreness, but otherwise her symptoms continue without changes.  She is on 1-2 mg of tizanidine daily, although does not take it every day.  She finds it somewhat helpful.  Still has 3 refills left.  No significant side effects.  She denies interval development of new focal neurological symptoms.  EXAM:                                                    Physical Exam:   Vitals: /87 (BP Location: Right arm, Patient Position: Sitting, Cuff Size: Adult Regular)   Pulse 68   LMP 02/08/2013   SpO2 98%     General: pt is in NAD, cooperative.  Skin: normal turgor, moist mucous membranes, no lesions/rashes noticed.  HEENT: ATNC, white sclera, normal conjunctiva.  Respiratory: Symmetric lung excursion, no accessory respiratory muscle use.  Abdomen: Non distended.  Neurological: awake, cooperative, follows commands, strength is 5/5 in upper and lower extremities, no tremor, normal gait.  ASSESSMENT AND PLAN:                                                    Assessment: 64 year old female patient presents for follow-up of intermittent muscle twitching.  She reports that her symptoms are essentially unchanged after trying tizanidine, though she finds it somewhat helpful.  Previously we discussed carbamazepine if muscle relaxant is not effective.  This could be considered in the future, but for now we decided to continue tizanidine.   We also discussed option of trying SSRI or SNRI to address her anxiety.  We reviewed patient's concerns regarding possibility of ALS, Parkinson's disease, and multiple sclerosis.  We talked about obtaining EMG, but decided to hold off.    Diagnoses:    ICD-10-CM    1. Muscle spasm  M62.838       2. Benign fasciculations  R25.3         Plan: At today's visit we thoroughly discussed current symptoms, possible future evaluation (EMG), available treatment options, and the plan.    We decided to make any medication changes.  I advised the patient to reach out if she needs a refill of tizanidine prior to her next visit.    Next follow-up appointment is in the next 4 months or earlier if needed.    Total Time: 41 minutes spent on the date of the encounter doing chart review, history and exam, documentation and further activities per the note.  Additional time was needed to review patient's concerns in details, discussed my impression, additional testing, and recommendations while answering her questions.    Haim Fitzgerald MD  [facility] Neurology  (Chart documentation was completed in part with Dragon voice-recognition software. Even though reviewed, some grammatical, spelling, and word errors may remain.)

## 2024-09-26 NOTE — LETTER
9/26/2024      Mercedes Lema  [address] [place] [address]      Dear Colleague,    Thank you for referring your patient, Mercedes Lema, to the [facility]. Please see a copy of my visit note below.    ESTABLISHED PATIENT NEUROLOGY NOTE    DATE OF VISIT: 9/26/2024  CLINIC LOCATION: [facility]  MRN: [id number]  PATIENT NAME: Mercedes Lema  YOB: 1960    REASON FOR VISIT:   Chief Complaint   Patient presents with     Follow Up     Muscle Spasm/Fasciation      SUBJECTIVE:                                                      HISTORY OF PRESENT ILLNESS: Patient is here to follow up regarding muscle spasms/twitching. Please refer to my initial note from 5/31/2024 for further information.    Since the last visit, the patient reports that she experiences more soreness, but otherwise her symptoms continue without changes.  She is on 1-2 mg of tizanidine daily, although does not take it every day.  She finds it somewhat helpful.  Still has 3 refills left.  No significant side effects.  She denies interval development of new focal neurological symptoms.  EXAM:                                                    Physical Exam:   Vitals: /87 (BP Location: Right arm, Patient Position: Sitting, Cuff Size: Adult Regular)   Pulse 68   LMP 02/08/2013   SpO2 98%     General: pt is in NAD, cooperative.  Skin: normal turgor, moist mucous membranes, no lesions/rashes noticed.  HEENT: ATNC, white sclera, normal conjunctiva.  Respiratory: Symmetric lung excursion, no accessory respiratory muscle use.  Abdomen: Non distended.  Neurological: awake, cooperative, follows commands, strength is 5/5 in upper and lower extremities, no tremor, normal gait.  ASSESSMENT AND PLAN:                                                    Assessment: 64 year old female patient presents for follow-up of intermittent muscle twitching.  She reports that her symptoms are  "essentially unchanged after trying tizanidine, though she finds it somewhat helpful.  Previously we discussed carbamazepine if muscle relaxant is not effective.  This could be considered in the future, but for now we decided to continue tizanidine.  We also discussed option of trying SSRI or SNRI to address her anxiety.  We reviewed patient's concerns regarding possibility of ALS, Parkinson's disease, and multiple sclerosis.  We talked about obtaining EMG, but decided to hold off.    Diagnoses:    ICD-10-CM    1. Muscle spasm  M62.838       2. Benign fasciculations  R25.3         Plan: At today's visit we thoroughly discussed current symptoms, possible future evaluation (EMG), available treatment options, and the plan.    We decided to make any medication changes.  I advised the patient to reach out if she needs a refill of tizanidine prior to her next visit.    Next follow-up appointment is in the next 4 months or earlier if needed.    Total Time: 41 minutes spent on the date of the encounter doing chart review, history and exam, documentation and further activities per the note.  Additional time was needed to review patient's concerns in details, discussed my impression, additional testing, and recommendations while answering her questions.    Haim Fitzgerald MD  [facility]  (Chart documentation was completed in part with Dragon voice-recognition software. Even though reviewed, some grammatical, spelling, and word errors may remain.)    Mercedes Lema is a 64 year old female who presents for:  Chief Complaint   Patient presents with     Follow Up     Muscle Spasm/Fasciation         Initial Vitals:  /87 (BP Location: Right arm, Patient Position: Sitting, Cuff Size: Adult Regular)   Pulse 68   LMP 02/08/2013   SpO2 98%  Estimated body mass index is 20.29 kg/m  as calculated from the following:    Height as of 5/31/24: 1.626 m (5' 4\").    Weight as of 5/31/24: 53.6 kg (118 lb 3.2 " oz).. There is no height or weight on file to calculate BSA. BP completed using cuff size: dony Crum       Again, thank you for allowing me to participate in the care of your patient.        Sincerely,        Haim Fitzgerald MD

## 2024-10-11 ENCOUNTER — TELEPHONE (OUTPATIENT)
Dept: NEUROLOGY | Facility: CLINIC | Age: 64
End: 2024-10-11
Payer: COMMERCIAL

## 2024-10-11 ENCOUNTER — TELEPHONE (OUTPATIENT)
Dept: PEDIATRICS | Facility: CLINIC | Age: 64
End: 2024-10-11
Payer: COMMERCIAL

## 2024-10-11 NOTE — TELEPHONE ENCOUNTER
Message left for patient to call and reschedule January 21, 2025 appointment with Dr. Fitzgerald. There is a conflict with his  timeslot. Patient was offered a variety of open times beginning with Friday January 24, and moving into the following week.     Patient advised to call [phone] for rescheduling follow up visit.

## 2024-10-11 NOTE — TELEPHONE ENCOUNTER
S: Jaw pain  B: has been taking Actonel for 9 months and noticed jaw pain about a month ago.   A: lower right jaw has a dull ache, constant, does not interfere with sleep. Saw Dentist and had a normal exam.    R: patient scheduled appointment 10/22/24 to discuss if this could be a side effect of Actonel.

## 2024-10-22 ENCOUNTER — VIRTUAL VISIT (OUTPATIENT)
Dept: PEDIATRICS | Facility: CLINIC | Age: 64
End: 2024-10-22
Payer: COMMERCIAL

## 2024-10-22 DIAGNOSIS — M81.0 AGE-RELATED OSTEOPOROSIS WITHOUT CURRENT PATHOLOGICAL FRACTURE: ICD-10-CM

## 2024-10-22 DIAGNOSIS — R68.84 JAW PAIN: Primary | ICD-10-CM

## 2024-10-22 PROCEDURE — 99442 PR PHYSICIAN TELEPHONE EVALUATION 11-20 MIN: CPT | Mod: 93 | Performed by: INTERNAL MEDICINE

## 2024-10-22 NOTE — PROGRESS NOTES
Mercedes is a 64 year old who is being evaluated via a billable telephone visit.    What phone number would you like to be contacted at? [phone]  How would you like to obtain your AVS? Amy  Originating Location (pt. Location): Home    Distant Location (provider location):  On-site    Assessment & Plan     Jaw pain  Unclear cause.  Follow-up with dentist as scheduled.  Stay off bisphosphonate for now    Age-related osteoporosis without current pathological fracture  Off medication for now while investigating jaw pain.  Will need alternate treatment if gets better.      See Patient Instructions    Subjective   Mercedes is a 64 year old, presenting for the following health issues:  Jaw Pain        10/22/2024     9:55 AM   Additional Questions   Roomed by Amie LAINEZ   Accompanied by Self         10/22/2024     9:55 AM   Patient Reported Additional Medications   Patient reports taking the following new medications None     History of Present Illness       Reason for visit:  Jaw Pain  Symptom onset:  3-4 weeks ago  Symptoms include:  Pain  Symptom intensity:  Moderate  Symptom progression:  Staying the same  Had these symptoms before:  No  Prior treatment description:  NA  What makes it worse:  No  What makes it better:  No    She eats 2-3 servings of fruits and vegetables daily.She consumes 0 sweetened beverage(s) daily.She exercises with enough effort to increase her heart rate 30 to 60 minutes per day.  She exercises with enough effort to increase her heart rate 3 or less days per week.   She is taking medications regularly.     Dexa from 11/29/23 with osteoporosis T score -2.6 right hip at lowest.  Started bisphosphonate after that.    Jaw pain started about mid-September.  Lower right jaw - feels like it is in the jaw bone.  Not tender to touch.  No swelling.  Not in TMJ area.  Having fasciculations and muscle spasm that started before going on bisphosphonate - August 2023.  Discomfort all the time from that.  "     Went to dentist and was told normal exam but no x-rays done.  Has follow-up with dentist later this week.  Worried that it related to the risedronate.  Takes it weekly on Wednesday but didn't take last week due to worries.                Objective    Vitals - Patient Reported  Weight (Patient Reported): 53.5 kg (118 lb)  Height (Patient Reported): 163.8 cm (5' 4.5\")  BMI (Based on Pt Reported Ht/Wt): 19.94  Pain Score: Moderate Pain (5) (dull pain that is always there per pt)  Pain Loc: Other - see comment (jaw)        Physical Exam   General: Alert and no distress //Respiratory: No audible wheeze, cough, or shortness of breath // Psychiatric:  Appropriate affect, tone, and pace of words            Phone call duration: 15 minutes  Signed Electronically by: Tabitha Quintero MD    "

## 2024-11-20 DIAGNOSIS — I10 BENIGN ESSENTIAL HYPERTENSION: ICD-10-CM

## 2024-11-21 RX ORDER — AMLODIPINE BESYLATE 5 MG/1
5 TABLET ORAL DAILY
Qty: 90 TABLET | Refills: 0 | Status: SHIPPED | OUTPATIENT
Start: 2024-11-21

## 2024-11-22 PROBLEM — M81.0 AGE-RELATED OSTEOPOROSIS WITHOUT CURRENT PATHOLOGICAL FRACTURE: Status: ACTIVE | Noted: 2020-12-02

## 2025-01-07 ENCOUNTER — MYC MEDICAL ADVICE (OUTPATIENT)
Dept: PEDIATRICS | Facility: CLINIC | Age: 65
End: 2025-01-07
Payer: COMMERCIAL

## 2025-01-08 ENCOUNTER — MYC MEDICAL ADVICE (OUTPATIENT)
Dept: PEDIATRICS | Facility: CLINIC | Age: 65
End: 2025-01-08
Payer: COMMERCIAL

## 2025-01-08 DIAGNOSIS — M79.10 MYALGIA: ICD-10-CM

## 2025-01-08 DIAGNOSIS — M62.81 GENERALIZED MUSCLE WEAKNESS: ICD-10-CM

## 2025-01-08 DIAGNOSIS — M62.838 MUSCLE SPASM: ICD-10-CM

## 2025-01-08 DIAGNOSIS — M81.0 AGE-RELATED OSTEOPOROSIS WITHOUT CURRENT PATHOLOGICAL FRACTURE: ICD-10-CM

## 2025-01-08 RX ORDER — RISEDRONATE SODIUM 35 MG/1
35 TABLET, FILM COATED ORAL
Qty: 12 TABLET | Refills: 2 | Status: SHIPPED | OUTPATIENT
Start: 2025-01-08

## 2025-01-08 NOTE — TELEPHONE ENCOUNTER
Routing to provider to please review and advise. Patient requesting refill for amitriptyline 25 mg.     Please see [employer] message from patient.     Patient had office visit on 11/22/24. Plan per PCP was:   Muscle spasm  As above  - TSH with free T4 reflex; Future  - amitriptyline (ELAVIL) 10 MG tablet; Take 1-3 tablets (10-30 mg) by mouth at bedtime. Start with one tablet and increase every other night to 3 tablets as needed  - Adult Physical Medicine and Rehab  Referral; Future  - TSH with free T4 reflex    Mago Harkins, RN, BSN, PHN  [facility]

## 2025-02-05 NOTE — PROGRESS NOTES
ESTABLISHED PATIENT NEUROLOGY NOTE    DATE OF VISIT: 2/6/2025  CLINIC LOCATION: [facility]  MRN: [id number]  PATIENT NAME: Mercedes Lema  YOB: 1960    REASON FOR VISIT: No chief complaint on file.    SUBJECTIVE:                                                      HISTORY OF PRESENT ILLNESS: Patient is here to follow up regarding muscle spasms/twitching. Please refer to my initial/other prior notes for further information.    Since the last visit, the patient reports ***.  She is 1 to 2 mg of tizanidine daily, which is helpful.  Primary care provider prescribed her amitriptyline (currently takes 25 mg at bedtime), which is somewhat helpful.  Denies any significant side effects or interval development of new focal neurological symptoms.  EXAM:                                                    Physical Exam:   Vitals: LMP 02/08/2013     General: pt is in NAD, cooperative.  Skin: normal turgor, moist mucous membranes, no lesions/rashes noticed.  HEENT: ATNC, white sclera, normal conjunctiva.  Respiratory: Symmetric lung excursion, no accessory respiratory muscle use.  Abdomen: Non distended.  Neurological: awake, cooperative, follows commands, no exam changes compared to previous visits.  ASSESSMENT AND PLAN:                                                    Assessment: 64 year old female patient presents for follow-up of intermittent muscle twitching.  She reports ***.  Previously we discussed trial of oxcarbazepine or carbamazepine.  We also reviewed option of trying SSRI or SNRI to help with anxiety.  We discussed EMG.    Diagnoses:    ICD-10-CM    1. Muscle spasm  M62.838       2. Benign fasciculations  R25.3         Plan:  There are no Patient Instructions on file for this visit.    Total Time: *** minutes spent on the date of the encounter doing chart review, history and exam, documentation and further activities per the note.    Haim Fitzgerald MD  [facility]  [facility]  (Chart documentation was completed in part with Dragon voice-recognition software. Even though reviewed, some grammatical, spelling, and word errors may remain.)

## 2025-02-06 ENCOUNTER — OFFICE VISIT (OUTPATIENT)
Dept: NEUROLOGY | Facility: CLINIC | Age: 65
End: 2025-02-06
Payer: COMMERCIAL

## 2025-02-06 VITALS — SYSTOLIC BLOOD PRESSURE: 126 MMHG | OXYGEN SATURATION: 98 % | HEART RATE: 99 BPM | DIASTOLIC BLOOD PRESSURE: 90 MMHG

## 2025-02-06 DIAGNOSIS — R25.3 BENIGN FASCICULATIONS: ICD-10-CM

## 2025-02-06 DIAGNOSIS — M62.838 MUSCLE SPASM: Primary | ICD-10-CM

## 2025-02-06 RX ORDER — TIZANIDINE 2 MG/1
2 TABLET ORAL 2 TIMES DAILY PRN
Qty: 30 TABLET | Refills: 3 | Status: CANCELLED | OUTPATIENT
Start: 2025-02-06

## 2025-02-06 NOTE — PROGRESS NOTES
ESTABLISHED PATIENT NEUROLOGY NOTE    DATE OF VISIT: 2/6/2025  CLINIC LOCATION: [facility]  MRN: [id number]  PATIENT NAME: Mercedes Lema  YOB: 1960    REASON FOR VISIT:   Chief Complaint   Patient presents with    RECHECK     She feels pretty stable since the last appointment     SUBJECTIVE:                                                      HISTORY OF PRESENT ILLNESS: Patient is here to follow up regarding muscle spasms/twitching. Please refer to my initial/other prior notes for further information.    Since the last visit, the patient reports that her symptoms are stable/platoed.  She is 1 to 2 mg of tizanidine as needed, which is helpful, although she uses it rarely.  She states that she does not need a refill at the present time.  Primary care provider prescribed her amitriptyline (currently takes 25 mg at bedtime), which is helpful for her muscles as well as anxiety.  Denies any significant side effects or interval development of new focal neurological symptoms.  EXAM:                                                    Physical Exam:   Vitals: BP (!) 143/93 (BP Location: Left arm, Patient Position: Sitting, Cuff Size: Adult Regular)   Pulse 99   LMP 02/08/2013   SpO2 98%     General: pt is in NAD, cooperative.  Skin: normal turgor, moist mucous membranes, no lesions/rashes noticed.  HEENT: ATNC, white sclera, normal conjunctiva.  Respiratory: Symmetric lung excursion, no accessory respiratory muscle use.  Abdomen: Non distended.  Neurological: awake, cooperative, follows commands, no exam changes compared to previous visits.  ASSESSMENT AND PLAN:                                                    Assessment: 64 year old female patient presents for follow-up of intermittent muscle twitching.  She reports that her symptoms are stable currently.  Previously we discussed trial of oxcarbazepine or carbamazepine.  We also reviewed option of trying SSRI or SNRI to help with  anxiety in the past, but she was recently started on amitriptyline, which is quite helpful.  We also discussed EMG previously, but did not feel that we need to pursue this at this point.  We decided to monitor symptoms without doing any additional interventions.  She should continue as needed tizanidine, but when she needs a refill, she could request it from her primary care provider.  She states that currently she does not refill of tizanidine.    Mercedes to follow up with Primary Care provider regarding elevated blood pressure.     Diagnoses:    ICD-10-CM    1. Muscle spasm  M62.838       2. Benign fasciculations  R25.3         Plan: At today's visit we thoroughly discussed current symptoms, available treatment options, and the plan.    We decided to continue as needed tizanidine and prescribed by her primary care provider amitriptyline.  I advised the patient to come back if her symptoms worsen in the future.    Next follow-up appointment is on as needed basis.    Total Time: 17 minutes spent on the date of the encounter doing chart review, history and exam, documentation and further activities per the note.    Haim Fitzgerald MD  [facility]  (Chart documentation was completed in part with Dragon voice-recognition software. Even though reviewed, some grammatical, spelling, and word errors may remain.)

## 2025-02-06 NOTE — NURSING NOTE
"Mercedes Lema is a 64 year old female who presents for:  Chief Complaint   Patient presents with    RECHECK     She feels pretty stable since the last appointment        Initial Vitals:  BP (!) 143/93 (BP Location: Left arm, Patient Position: Sitting, Cuff Size: Adult Regular)   Pulse 99   LMP 02/08/2013   SpO2 98%  Estimated body mass index is 20.31 kg/m  as calculated from the following:    Height as of 11/22/24: 1.64 m (5' 4.57\").    Weight as of 11/22/24: 54.6 kg (120 lb 6.4 oz).. There is no height or weight on file to calculate BSA. BP completed using cuff size: dony Cottrell    "

## 2025-02-06 NOTE — PATIENT INSTRUCTIONS
AFTER VISIT SUMMARY (AVS):    At today's visit we thoroughly discussed current symptoms, available treatment options, and the plan.    We decided to continue as needed tizanidine and prescribed by your primary care provider amitriptyline.  Come back if your symptoms worsen in the future.    Next follow-up appointment is on as needed basis.    Please do not hesitate to call me with any questions or concerns.    Thanks.

## 2025-02-06 NOTE — LETTER
2/6/2025      Mercedes Lema  [address] [place] [address]      Dear Colleague,    Thank you for referring your patient, Mercedes Lema, to the [facility] NEUROLOGY [facility]. Please see a copy of my visit note below.    ESTABLISHED PATIENT NEUROLOGY NOTE    DATE OF VISIT: 2/6/2025  CLINIC LOCATION: [facility]  MRN: [id number]  PATIENT NAME: Mercedes Lema  YOB: 1960    REASON FOR VISIT:   Chief Complaint   Patient presents with     RECHECK     She feels pretty stable since the last appointment     SUBJECTIVE:                                                      HISTORY OF PRESENT ILLNESS: Patient is here to follow up regarding muscle spasms/twitching. Please refer to my initial/other prior notes for further information.    Since the last visit, the patient reports that her symptoms are stable/platoed.  She is 1 to 2 mg of tizanidine as needed, which is helpful, although she uses it rarely.  She states that she does not need a refill at the present time.  Primary care provider prescribed her amitriptyline (currently takes 25 mg at bedtime), which is helpful for her muscles as well as anxiety.  Denies any significant side effects or interval development of new focal neurological symptoms.  EXAM:                                                    Physical Exam:   Vitals: BP (!) 143/93 (BP Location: Left arm, Patient Position: Sitting, Cuff Size: Adult Regular)   Pulse 99   LMP 02/08/2013   SpO2 98%     General: pt is in NAD, cooperative.  Skin: normal turgor, moist mucous membranes, no lesions/rashes noticed.  HEENT: ATNC, white sclera, normal conjunctiva.  Respiratory: Symmetric lung excursion, no accessory respiratory muscle use.  Abdomen: Non distended.  Neurological: awake, cooperative, follows commands, no exam changes compared to previous visits.  ASSESSMENT AND PLAN:                                                    Assessment: 64 year old  female patient presents for follow-up of intermittent muscle twitching.  She reports that her symptoms are stable currently.  Previously we discussed trial of oxcarbazepine or carbamazepine.  We also reviewed option of trying SSRI or SNRI to help with anxiety in the past, but she was recently started on amitriptyline, which is quite helpful.  We also discussed EMG previously, but did not feel that we need to pursue this at this point.  We decided to monitor symptoms without doing any additional interventions.  She should continue as needed tizanidine, but when she needs a refill, she could request it from her primary care provider.  She states that currently she does not refill of tizanidine.    Mercedes to follow up with Primary Care provider regarding elevated blood pressure.     Diagnoses:    ICD-10-CM    1. Muscle spasm  M62.838       2. Benign fasciculations  R25.3         Plan: At today's visit we thoroughly discussed current symptoms, available treatment options, and the plan.    We decided to continue as needed tizanidine and prescribed by her primary care provider amitriptyline.  I advised the patient to come back if her symptoms worsen in the future.    Next follow-up appointment is on as needed basis.    Total Time: 17 minutes spent on the date of the encounter doing chart review, history and exam, documentation and further activities per the note.    Haim Fitzgerald MD  [facility]  (Chart documentation was completed in part with Dragon voice-recognition software. Even though reviewed, some grammatical, spelling, and word errors may remain.)      Again, thank you for allowing me to participate in the care of your patient.        Sincerely,        Haim Fitzgerald MD    Electronically signed

## 2025-06-12 ENCOUNTER — PATIENT OUTREACH (OUTPATIENT)
Dept: CARE COORDINATION | Facility: CLINIC | Age: 65
End: 2025-06-12
Payer: COMMERCIAL

## 2025-07-14 ENCOUNTER — HOSPITAL ENCOUNTER (OUTPATIENT)
Dept: MAMMOGRAPHY | Facility: CLINIC | Age: 65
Discharge: HOME | End: 2025-07-14
Attending: INTERNAL MEDICINE | Admitting: INTERNAL MEDICINE
Payer: COMMERCIAL

## 2025-07-14 DIAGNOSIS — Z12.31 VISIT FOR SCREENING MAMMOGRAM: ICD-10-CM

## 2025-07-14 PROCEDURE — 77063 BREAST TOMOSYNTHESIS BI: CPT
